# Patient Record
Sex: MALE | Race: WHITE | NOT HISPANIC OR LATINO | Employment: OTHER | URBAN - NONMETROPOLITAN AREA
[De-identification: names, ages, dates, MRNs, and addresses within clinical notes are randomized per-mention and may not be internally consistent; named-entity substitution may affect disease eponyms.]

---

## 2019-06-21 ENCOUNTER — APPOINTMENT (OUTPATIENT)
Dept: RADIOLOGY | Facility: MEDICAL CENTER | Age: 69
End: 2019-06-21
Payer: COMMERCIAL

## 2019-06-21 ENCOUNTER — CONSULT (OUTPATIENT)
Dept: PAIN MEDICINE | Facility: CLINIC | Age: 69
End: 2019-06-21
Payer: MEDICARE

## 2019-06-21 VITALS
WEIGHT: 146 LBS | DIASTOLIC BLOOD PRESSURE: 68 MMHG | SYSTOLIC BLOOD PRESSURE: 114 MMHG | BODY MASS INDEX: 22.13 KG/M2 | HEIGHT: 68 IN

## 2019-06-21 DIAGNOSIS — M47.812 OSTEOARTHRITIS OF CERVICAL SPINE, UNSPECIFIED SPINAL OSTEOARTHRITIS COMPLICATION STATUS: ICD-10-CM

## 2019-06-21 DIAGNOSIS — M47.816 LUMBAR SPONDYLOSIS: ICD-10-CM

## 2019-06-21 DIAGNOSIS — G56.01 CARPAL TUNNEL SYNDROME OF RIGHT WRIST: ICD-10-CM

## 2019-06-21 DIAGNOSIS — M54.16 LUMBAR RADICULOPATHY: ICD-10-CM

## 2019-06-21 DIAGNOSIS — M48.02 CERVICAL SPINAL STENOSIS: ICD-10-CM

## 2019-06-21 DIAGNOSIS — M19.011 PRIMARY OSTEOARTHRITIS OF SHOULDERS, BILATERAL: ICD-10-CM

## 2019-06-21 DIAGNOSIS — M54.12 CERVICAL RADICULOPATHY: ICD-10-CM

## 2019-06-21 DIAGNOSIS — G89.29 CHRONIC BILATERAL LOW BACK PAIN, WITH SCIATICA PRESENCE UNSPECIFIED: Primary | ICD-10-CM

## 2019-06-21 DIAGNOSIS — M54.5 CHRONIC BILATERAL LOW BACK PAIN, WITH SCIATICA PRESENCE UNSPECIFIED: Primary | ICD-10-CM

## 2019-06-21 DIAGNOSIS — S42.031S CLOSED DISPLACED FRACTURE OF ACROMIAL END OF RIGHT CLAVICLE, SEQUELA: ICD-10-CM

## 2019-06-21 DIAGNOSIS — I63.9 CEREBROVASCULAR ACCIDENT (CVA), UNSPECIFIED MECHANISM (HCC): ICD-10-CM

## 2019-06-21 DIAGNOSIS — M19.012 PRIMARY OSTEOARTHRITIS OF SHOULDERS, BILATERAL: ICD-10-CM

## 2019-06-21 PROCEDURE — 73000 X-RAY EXAM OF COLLAR BONE: CPT

## 2019-06-21 PROCEDURE — 73030 X-RAY EXAM OF SHOULDER: CPT

## 2019-06-21 PROCEDURE — 99204 OFFICE O/P NEW MOD 45 MIN: CPT | Performed by: ANESTHESIOLOGY

## 2019-06-21 PROCEDURE — 72114 X-RAY EXAM L-S SPINE BENDING: CPT

## 2019-06-21 RX ORDER — GABAPENTIN 100 MG/1
100 CAPSULE ORAL 3 TIMES DAILY
COMMUNITY
End: 2019-10-10 | Stop reason: SDUPTHER

## 2019-06-21 RX ORDER — FAMOTIDINE 20 MG/1
20 TABLET, FILM COATED ORAL 2 TIMES DAILY
Status: ON HOLD | COMMUNITY
End: 2022-06-27 | Stop reason: ALTCHOICE

## 2019-06-21 RX ORDER — AMLODIPINE BESYLATE 2.5 MG/1
5 TABLET ORAL DAILY
Status: ON HOLD | COMMUNITY
End: 2022-06-27 | Stop reason: ALTCHOICE

## 2019-06-21 RX ORDER — ATORVASTATIN CALCIUM 40 MG/1
40 TABLET, FILM COATED ORAL DAILY
COMMUNITY
End: 2022-07-04

## 2019-06-21 RX ORDER — BACLOFEN 10 MG/1
10 TABLET ORAL 2 TIMES DAILY
Status: ON HOLD | COMMUNITY
End: 2022-06-27 | Stop reason: ALTCHOICE

## 2019-07-01 ENCOUNTER — TELEPHONE (OUTPATIENT)
Dept: PAIN MEDICINE | Facility: MEDICAL CENTER | Age: 69
End: 2019-07-01

## 2019-07-02 ENCOUNTER — TELEPHONE (OUTPATIENT)
Dept: RADIOLOGY | Facility: CLINIC | Age: 69
End: 2019-07-02

## 2019-07-02 NOTE — TELEPHONE ENCOUNTER
----- Message from Bharati Sotomayor MD sent at 7/2/2019  2:42 PM EDT -----  Please call the patient regarding his abnormal result    X-ray of lumbar spine shows disc space narrowing at T12-L1 and L1-L2, multilevel degenerative disc changes

## 2019-07-02 NOTE — TELEPHONE ENCOUNTER
----- Message from Ashlie Rendon MD sent at 7/2/2019  2:42 PM EDT -----  Please call the patient regarding his abnormal result    X-ray left shoulder shows normal shoulder

## 2019-07-02 NOTE — TELEPHONE ENCOUNTER
----- Message from Bud Fonseca MD sent at 7/2/2019  2:41 PM EDT -----  Please call the patient regarding his abnormal result    X-ray of right clavicle shows a dislocation of fracture

## 2019-07-03 NOTE — TELEPHONE ENCOUNTER
Left detailed message as per MARJORIE advising the same  Pt has 7/25 ov  Advised to call ortho to schedule appt as referred at last ov  Advised will call with MRI results once received  MRI is scheduled 7/9

## 2019-07-09 ENCOUNTER — HOSPITAL ENCOUNTER (OUTPATIENT)
Dept: MRI IMAGING | Facility: HOSPITAL | Age: 69
Discharge: HOME | End: 2019-07-09
Attending: ANESTHESIOLOGY
Payer: COMMERCIAL

## 2019-07-09 DIAGNOSIS — M54.12 CERVICAL RADICULOPATHY: ICD-10-CM

## 2019-07-09 DIAGNOSIS — M48.02 CERVICAL SPINAL STENOSIS: ICD-10-CM

## 2019-07-09 DIAGNOSIS — M47.812 OSTEOARTHRITIS OF CERVICAL SPINE, UNSPECIFIED SPINAL OSTEOARTHRITIS COMPLICATION STATUS: ICD-10-CM

## 2019-07-09 PROCEDURE — 72141 MRI NECK SPINE W/O DYE: CPT

## 2019-08-13 ENCOUNTER — TELEPHONE (OUTPATIENT)
Dept: PAIN MEDICINE | Facility: MEDICAL CENTER | Age: 69
End: 2019-08-13

## 2019-08-14 NOTE — TELEPHONE ENCOUNTER
MRI cervical spine shows multiple levels of spinal canal stenosis and degenerative disc disease with arthritic changes    We will schedule patient for a C7-T1 interlaminar epidural steroid injection

## 2019-08-14 NOTE — TELEPHONE ENCOUNTER
S/W pt and advised of results and plan  Explained risks of injection briefly, pt would like to further discuss with  regarding timing and procedure  Advised  would reach out to pt to schedule

## 2019-08-15 NOTE — TELEPHONE ENCOUNTER
Patient returned call, he states that he is set to have spine injections with Dr Antonio Reid, patient was advised to follow up with Dr Antonio Reid for cervical injections, patient will  Patient asked if he could have botox injections into his arms in the office  Patient was advised that RM does not perform botox injections in the office and that there are restrictions with botox specifically  Patient verbalized understanding  Patient requesting if he could have "any" injections performed in the office into his arms  I told patient that I would check w/ Rm and get back to him  Patient verbalized understanding, he was pleasant and appreciative  NUVIA,  What type of in office USGI injection would you like to perform?

## 2019-08-26 NOTE — TELEPHONE ENCOUNTER
I can do botox injections in the surgical center but would have to see pt in the office to map the spasmatic muscles

## 2019-10-09 ENCOUNTER — TELEPHONE (OUTPATIENT)
Dept: PAIN MEDICINE | Facility: MEDICAL CENTER | Age: 69
End: 2019-10-09

## 2019-10-10 ENCOUNTER — OFFICE VISIT (OUTPATIENT)
Dept: PAIN MEDICINE | Facility: CLINIC | Age: 69
End: 2019-10-10
Payer: COMMERCIAL

## 2019-10-10 VITALS
BODY MASS INDEX: 20.37 KG/M2 | WEIGHT: 134.4 LBS | SYSTOLIC BLOOD PRESSURE: 120 MMHG | DIASTOLIC BLOOD PRESSURE: 75 MMHG | HEIGHT: 68 IN

## 2019-10-10 DIAGNOSIS — M47.816 LUMBAR SPONDYLOSIS: ICD-10-CM

## 2019-10-10 DIAGNOSIS — F11.20 UNCOMPLICATED OPIOID DEPENDENCE (HCC): ICD-10-CM

## 2019-10-10 DIAGNOSIS — M54.12 CERVICAL RADICULOPATHY: ICD-10-CM

## 2019-10-10 DIAGNOSIS — Z79.891 LONG-TERM CURRENT USE OF OPIATE ANALGESIC: ICD-10-CM

## 2019-10-10 DIAGNOSIS — G89.29 CHRONIC BILATERAL LOW BACK PAIN WITH SCIATICA, SCIATICA LATERALITY UNSPECIFIED: ICD-10-CM

## 2019-10-10 DIAGNOSIS — M54.40 CHRONIC BILATERAL LOW BACK PAIN WITH SCIATICA, SCIATICA LATERALITY UNSPECIFIED: ICD-10-CM

## 2019-10-10 DIAGNOSIS — G89.4 CHRONIC PAIN SYNDROME: ICD-10-CM

## 2019-10-10 DIAGNOSIS — M48.02 CERVICAL SPINAL STENOSIS: Primary | ICD-10-CM

## 2019-10-10 DIAGNOSIS — M54.2 NECK PAIN: ICD-10-CM

## 2019-10-10 PROCEDURE — 99214 OFFICE O/P EST MOD 30 MIN: CPT | Performed by: ANESTHESIOLOGY

## 2019-10-10 RX ORDER — GABAPENTIN 100 MG/1
300 CAPSULE ORAL 3 TIMES DAILY
Qty: 90 CAPSULE | Refills: 0 | Status: SHIPPED | OUTPATIENT
Start: 2019-10-10 | End: 2019-11-01

## 2019-10-10 RX ORDER — ASPIRIN 81 MG/1
81 TABLET ORAL DAILY
Status: ON HOLD | COMMUNITY
End: 2022-06-27 | Stop reason: ALTCHOICE

## 2019-10-10 NOTE — PROGRESS NOTES
Assessment:  1  Cervical spinal stenosis    2  Cervical radiculopathy    3  Neck pain    4  Chronic bilateral low back pain with sciatica, sciatica laterality unspecified    5  Lumbar spondylosis    6  Chronic pain syndrome    7  Long-term current use of opiate analgesic    8  Uncomplicated opioid dependence ([address] )        Plan:  Patient is a 27-year-old male with complaints of multi joint arthritic pain, neck pain, left arm pain, low back pain, right leg pain , status post fall and December 2017 secondary to stroke and work related injury from 2001 with a history significant for anxiety and depression presents office for follow-up visit  MRI of cervical spine shows multilevel spondylitic changes and moderate spinal stenosis at C5-C6 and C6-C7 with severe foraminal stenosis at C4-C5 through C6-C7 more pronounced on the left than the right  This would correlate with the left C6 and C7 radiculitis  X-ray of right clavicle shows an AC type 3 injury  X-ray bilateral shoulders within normal limits  X-ray of lumbar spine shows multilevel degenerative disc changes  At this time patient would like to forego any interventional management due to prior history of bad experience with epidural steroid injections  1  We will titrate up gabapentin to 300 mg p o  T i d  for lumbar radicular and cervical radicular symptoms  2  We will obtain a urine drug screen and based results start patient on low-dose opioid pain regimen at next office visit  There are risks associated with opioid medications, including dependence, addiction and tolerance  The patient understands and agrees to use these medications only as prescribed  Potential side effects of the medications include, but are not limited to, constipation, drowsiness, addiction, impaired judgment and risk of fatal overdose if not taken as prescribed  The patient was warned against driving while taking sedation medications  Sharing medications is a felony   At this point in time, the patient is showing no signs of addiction, abuse, diversion or suicidal ideation  A urine drug screen was collected at today's office visit as part of our medication management protocol  The point of care testing results were appropriate for what was being prescribed  The specimen will be sent for confirmatory testing  The drug screen is medically necessary because the patient is either dependent on opioid medication or is being considered for opioid medication therapy and the results could impact ongoing or future treatment  The drug screen is to evaluate for the presences or absence of prescribed, non-prescribed, and/or illicit drugs/substances  [place] Prescription Drug Monitoring Program report was reviewed and was appropriate       History of Present Illness: The patient is a 80 y o  male who presents for a follow up office visit in regards to Headache  The patients current symptoms include 8/10 constant sharp, cramping, pressure-like, shooting, numbness and head, bilateral upper extremities left worse than right, left lower extremity which is worse in the morning  Current pain medications includes:    The patient reports that this regimen is providing 0% pain relief  The patient is reporting no side effects from this pain medication regimen  I have personally reviewed and/or updated the patient's past medical history, past surgical history, family history, social history, current medications, allergies, and vital signs today  Review of Systems  Review of Systems   Musculoskeletal: Positive for arthralgias and gait problem  DECREASED RANGE OF MOTION  JOINT STIFFNESS  PAIN IN EXTREMITY - BACK, NECK   All other systems reviewed and are negative  History reviewed  No pertinent past medical history  History reviewed  No pertinent surgical history  History reviewed  No pertinent family history      Social History     Occupational History    Not on file Tobacco Use    Smoking status: Not on file   Substance and Sexual Activity    Alcohol use: Not on file    Drug use: Not on file    Sexual activity: Not on file         Current Outpatient Medications:     amLODIPine (NORVASC) 2 5 mg tablet, Take 5 mg by mouth daily, Disp: , Rfl:     atorvastatin (LIPITOR) 40 mg tablet, Take 40 mg by mouth daily, Disp: , Rfl:     baclofen 10 mg tablet, Take 10 mg by mouth 2 (two) times a day, Disp: , Rfl:     famotidine (PEPCID) 20 mg tablet, Take 20 mg by mouth 2 (two) times a day, Disp: , Rfl:     gabapentin (NEURONTIN) 100 mg capsule, Take 100 mg by mouth 3 (three) times a day, Disp: , Rfl:     sertraline (ZOLOFT) 50 mg tablet, Take 50 mg by mouth daily, Disp: , Rfl:     No Known Allergies    Physical Exam:    /75 (BP Location: Right arm, Patient Position: Sitting, Cuff Size: Standard)   Ht 5' 8" (1 727 m)   Wt 61 kg (134 lb 6 4 oz)   BMI 20 44 kg/m²     Constitutional:normal, well developed, well nourished, alert, in no distress and non-toxic and no overt pain behavior  Eyes:anicteric  HEENT:grossly intact  Neck:supple, symmetric, trachea midline and no masses   Pulmonary:even and unlabored  Cardiovascular:No edema or pitting edema present  Skin:Normal without rashes or lesions and well hydrated  Psychiatric:Mood and affect appropriate  Neurologic:Cranial Nerves II-XII grossly intact  Musculoskeletal:antalgic and in wheelchair    Imaging  No orders to display       No orders of the defined types were placed in this encounter

## 2019-11-01 ENCOUNTER — OFFICE VISIT (OUTPATIENT)
Dept: PAIN MEDICINE | Facility: CLINIC | Age: 69
End: 2019-11-01
Payer: MEDICARE

## 2019-11-01 VITALS — DIASTOLIC BLOOD PRESSURE: 72 MMHG | SYSTOLIC BLOOD PRESSURE: 126 MMHG | BODY MASS INDEX: 20.44 KG/M2 | HEIGHT: 68 IN

## 2019-11-01 DIAGNOSIS — G89.4 CHRONIC PAIN SYNDROME: ICD-10-CM

## 2019-11-01 DIAGNOSIS — M54.40 CHRONIC BILATERAL LOW BACK PAIN WITH SCIATICA, SCIATICA LATERALITY UNSPECIFIED: ICD-10-CM

## 2019-11-01 DIAGNOSIS — G89.29 CHRONIC BILATERAL LOW BACK PAIN WITH SCIATICA, SCIATICA LATERALITY UNSPECIFIED: ICD-10-CM

## 2019-11-01 DIAGNOSIS — M54.2 NECK PAIN: ICD-10-CM

## 2019-11-01 DIAGNOSIS — Z79.891 LONG-TERM CURRENT USE OF OPIATE ANALGESIC: ICD-10-CM

## 2019-11-01 DIAGNOSIS — M48.02 CERVICAL SPINAL STENOSIS: Primary | ICD-10-CM

## 2019-11-01 DIAGNOSIS — M47.816 LUMBAR SPONDYLOSIS: ICD-10-CM

## 2019-11-01 DIAGNOSIS — M50.120 CERVICAL DISC DISORDER WITH RADICULOPATHY OF MID-CERVICAL REGION: ICD-10-CM

## 2019-11-01 DIAGNOSIS — M54.12 CERVICAL RADICULOPATHY: ICD-10-CM

## 2019-11-01 DIAGNOSIS — M47.812 CERVICAL SPONDYLOSIS: ICD-10-CM

## 2019-11-01 DIAGNOSIS — F11.20 UNCOMPLICATED OPIOID DEPENDENCE (HCC): ICD-10-CM

## 2019-11-01 PROCEDURE — 99214 OFFICE O/P EST MOD 30 MIN: CPT | Performed by: ANESTHESIOLOGY

## 2019-11-01 RX ORDER — HYDROCODONE BITARTRATE AND ACETAMINOPHEN 5; 325 MG/1; MG/1
1 TABLET ORAL 2 TIMES DAILY PRN
Qty: 60 TABLET | Refills: 0 | Status: SHIPPED | OUTPATIENT
Start: 2019-11-01 | End: 2019-11-01

## 2019-11-01 RX ORDER — HYDROCODONE BITARTRATE AND ACETAMINOPHEN 5; 325 MG/1; MG/1
1 TABLET ORAL 2 TIMES DAILY PRN
Qty: 60 TABLET | Refills: 0 | Status: SHIPPED | OUTPATIENT
Start: 2019-12-01 | End: 2020-01-17 | Stop reason: SDUPTHER

## 2019-11-01 RX ORDER — GABAPENTIN 400 MG/1
400 CAPSULE ORAL 3 TIMES DAILY
Qty: 90 CAPSULE | Refills: 0 | Status: SHIPPED | OUTPATIENT
Start: 2019-11-01 | End: 2020-01-17

## 2019-11-01 NOTE — PATIENT INSTRUCTIONS
Opioid Dependence   WHAT YOU NEED TO KNOW:   What is opioid dependence? Opioids are medicines, such as morphine and codeine, used to treat pain  Dependence happens after you have used opioids regularly for a long period of time  Dependence means that your body gets used to how much medicine you take  Dependence is not the same as addiction  Addiction means that a person uses opioids to get high instead of using them to control pain  What are the signs and symptoms of opioid dependence? · You need more of the opioid to get the same amount of pain relief as you did when you first started taking it  · You have tried to use less opioid medicine but are not able to  · You have withdrawal symptoms when you take less of the opioid  What are the signs and symptoms of opioid withdrawal?  You may have the following signs and symptoms if you suddenly stop taking opioids or if you decrease the amount you normally take:  · Yawning     · Runny nose     · Nausea or vomiting     · Diarrhea     · Chills or goosebumps    · Muscle aches or cramps     · Anxiety  How is opioid dependence diagnosed? Your healthcare provider will do a physical exam  He will ask you questions about your symptoms and your use of opioids  He will also ask about your current and past use of other drugs and any family history of drug abuse  How is opioid dependence treated? You may be treated in a hospital or you may be treated as an outpatient  During detoxification (detox), healthcare providers will slowly decrease your dose of the opioid medicine you are dependent on  They may use another opioid medicine such as methadone to decrease symptoms of withdrawal  You may need to take this or another medicine for some time  Your healthcare provider will also replace the opioid with another pain medicine that is less likely to cause dependence  He may also suggest that you receive counseling and social support during treatment     What are the risks of opioid dependence? There is a risk of overdose during early treatment with methadone  You may become dependent on the medicines used to treat opioid dependence  Without treatment, you may develop other health problems or become addicted to opioids  Your risk of abusing alcohol and other drugs increases  You may also develop risky behaviors that can lead to an overdose, violence, and suicidal thoughts  When should I contact my healthcare provider? · Your speech is slurred  · You have difficulty staying awake  · You have nausea and vomiting  · You are easily upset or cry easily  · You have poor balance  · You have questions or concerns about your condition or care  When should I seek immediate care or call 911? · You feel lightheaded or faint  · You have a fast, slow, or irregular heartbeat  · You have a seizure  CARE AGREEMENT:   You have the right to help plan your care  Learn about your health condition and how it may be treated  Discuss treatment options with your caregivers to decide what care you want to receive  You always have the right to refuse treatment  The above information is an  only  It is not intended as medical advice for individual conditions or treatments  Talk to your doctor, nurse or pharmacist before following any medical regimen to see if it is safe and effective for you  © 2017 [address]  Information is for End User's use only and may not be sold, redistributed or otherwise used for commercial purposes  All illustrations and images included in CareNotes® are the copyrighted property of A D A M , Inc  or [employer]gang Ly[employer]

## 2019-11-01 NOTE — PROGRESS NOTES
Assessment:  1  Cervical spinal stenosis    2  Cervical spondylosis    3  Cervical radiculopathy    4  Neck pain    5  Cervical disc disorder with radiculopathy of mid-cervical region    6  Lumbar spondylosis    7  Chronic pain syndrome    8  Uncomplicated opioid dependence ([address] )    9  Long-term current use of opiate analgesic        Plan:  Patient is a 81-year-old male with complaints of multi joint arthritic pain, neck pain, left arm pain, low back pain, right leg pain , status post fall and December 2017 secondary to [facility] related injury from 2001 with a history significant for anxiety and depression presents office for follow-up visit  Focus on conservative therapy at this time  1  UDS reviewed and found to be within normal limits  Opioid contract signed today  2  We will trial Norco 5/325mg T i d  for chronic pain syndrome 12/1/19 and 12/31/19  3  We will titrate gabapentin to 400 mg p o  T i d  For lumbar radicular and cervical radicular symptoms  4  Follow-up in 2 months       There are risks associated with opioid medications, including dependence, addiction and tolerance  The patient understands and agrees to use these medications only as prescribed  Potential side effects of the medications include, but are not limited to, constipation, drowsiness, addiction, impaired judgment and risk of fatal overdose if not taken as prescribed  The patient was warned against driving while taking sedation medications  Sharing medications is a felony  At this point in time, the patient is showing no signs of addiction, abuse, diversion or suicidal ideation  [place] Prescription Drug Monitoring Program report was reviewed and was appropriate       History of Present Illness: The patient is a 80 y o  male who presents for a follow up office visit in regards to Back Pain; Neck Pain; Shoulder Pain; Arm Pain; Hand Pain; Foot Pain; Hip Pain; Leg Pain; and Knee Pain     The patients current symptoms include 10/10 constant throbbing, cramping, shooting which is worse in the morning nighttime  Current pain medications includes:  Gabapentin 300 mg   The patient reports that this regimen is providing 15% pain relief  The patient is reporting no side effects from this pain medication regimen  I have personally reviewed and/or updated the patient's past medical history, past surgical history, family history, social history, current medications, allergies, and vital signs today  Review of Systems  Review of Systems   Musculoskeletal: Positive for gait problem  Joint stiffness  Pain in extremity    All other systems reviewed and are negative  History reviewed  No pertinent past medical history  History reviewed  No pertinent surgical history      Family History   Problem Relation Age of Onset    No Known Problems Mother     No Known Problems Father        Social History     Occupational History    Not on file   Tobacco Use    Smoking status: Current Every Day Smoker    Smokeless tobacco: Never Used   Substance and Sexual Activity    Alcohol use: Yes     Frequency: Monthly or less     Comment: occassional    Drug use: Never    Sexual activity: Not on file         Current Outpatient Medications:     amLODIPine (NORVASC) 2 5 mg tablet, Take 5 mg by mouth daily, Disp: , Rfl:     aspirin (ECOTRIN LOW STRENGTH) 81 mg EC tablet, Take 81 mg by mouth daily, Disp: , Rfl:     atorvastatin (LIPITOR) 40 mg tablet, Take 40 mg by mouth daily, Disp: , Rfl:     baclofen 10 mg tablet, Take 10 mg by mouth 2 (two) times a day, Disp: , Rfl:     famotidine (PEPCID) 20 mg tablet, Take 20 mg by mouth 2 (two) times a day, Disp: , Rfl:     gabapentin (NEURONTIN) 100 mg capsule, Take 3 capsules (300 mg total) by mouth 3 (three) times a day, Disp: 90 capsule, Rfl: 0    sertraline (ZOLOFT) 50 mg tablet, Take 50 mg by mouth daily, Disp: , Rfl:     No Known Allergies    Physical Exam:    /72 (BP Location: Right arm, Patient Position: Sitting, Cuff Size: Standard)   Ht 5' 8" (1 727 m)   BMI 20 44 kg/m²     Constitutional:normal, well developed, well nourished, alert, in no distress and non-toxic and no overt pain behavior  Eyes:anicteric  HEENT:grossly intact  Neck:supple, symmetric, trachea midline and no masses   Pulmonary:even and unlabored  Cardiovascular:No edema or pitting edema present  Skin:Normal without rashes or lesions and well hydrated  Psychiatric:Mood and affect appropriate  Neurologic:Cranial Nerves II-XII grossly intact  Musculoskeletal:antalgic and in wheelchair    Imaging  No orders to display       No orders of the defined types were placed in this encounter

## 2020-01-06 ENCOUNTER — TELEPHONE (OUTPATIENT)
Dept: PAIN MEDICINE | Facility: CLINIC | Age: 70
End: 2020-01-06

## 2020-01-06 NOTE — TELEPHONE ENCOUNTER
Patient would like to speak to Dr Ward directly about his status   Please advise, adebayo    Call back# [phone]

## 2020-01-17 ENCOUNTER — TELEPHONE (OUTPATIENT)
Dept: PAIN MEDICINE | Facility: CLINIC | Age: 70
End: 2020-01-17

## 2020-01-17 ENCOUNTER — OFFICE VISIT (OUTPATIENT)
Dept: PAIN MEDICINE | Facility: CLINIC | Age: 70
End: 2020-01-17
Payer: COMMERCIAL

## 2020-01-17 VITALS — HEIGHT: 68 IN | DIASTOLIC BLOOD PRESSURE: 75 MMHG | BODY MASS INDEX: 20.44 KG/M2 | SYSTOLIC BLOOD PRESSURE: 117 MMHG

## 2020-01-17 DIAGNOSIS — M54.2 NECK PAIN: ICD-10-CM

## 2020-01-17 DIAGNOSIS — F11.20 UNCOMPLICATED OPIOID DEPENDENCE (HCC): ICD-10-CM

## 2020-01-17 DIAGNOSIS — M47.812 CERVICAL SPONDYLOSIS: ICD-10-CM

## 2020-01-17 DIAGNOSIS — M54.12 CERVICAL RADICULOPATHY: ICD-10-CM

## 2020-01-17 DIAGNOSIS — G89.4 CHRONIC PAIN SYNDROME: ICD-10-CM

## 2020-01-17 DIAGNOSIS — M50.120 CERVICAL DISC DISORDER WITH RADICULOPATHY OF MID-CERVICAL REGION: ICD-10-CM

## 2020-01-17 DIAGNOSIS — Z79.891 LONG-TERM CURRENT USE OF OPIATE ANALGESIC: ICD-10-CM

## 2020-01-17 DIAGNOSIS — M47.816 LUMBAR SPONDYLOSIS: ICD-10-CM

## 2020-01-17 DIAGNOSIS — M48.02 CERVICAL SPINAL STENOSIS: Primary | ICD-10-CM

## 2020-01-17 PROCEDURE — 99214 OFFICE O/P EST MOD 30 MIN: CPT | Performed by: ANESTHESIOLOGY

## 2020-01-17 RX ORDER — HYDROCODONE BITARTRATE AND ACETAMINOPHEN 5; 325 MG/1; MG/1
1 TABLET ORAL 2 TIMES DAILY PRN
Qty: 60 TABLET | Refills: 0 | Status: SHIPPED | OUTPATIENT
Start: 2020-01-17 | End: 2020-01-17

## 2020-01-17 RX ORDER — GABAPENTIN 600 MG/1
600 TABLET ORAL 3 TIMES DAILY
Qty: 90 TABLET | Refills: 1 | Status: SHIPPED | OUTPATIENT
Start: 2020-01-17 | End: 2020-03-26 | Stop reason: SDUPTHER

## 2020-01-17 RX ORDER — HYDROCODONE BITARTRATE AND ACETAMINOPHEN 5; 325 MG/1; MG/1
1 TABLET ORAL 2 TIMES DAILY PRN
Qty: 60 TABLET | Refills: 0 | Status: SHIPPED | OUTPATIENT
Start: 2020-02-16 | End: 2020-03-17 | Stop reason: ALTCHOICE

## 2020-01-17 NOTE — TELEPHONE ENCOUNTER
Patient   [phone]  Dr Shirley Cuello    Patient is calling in stating that he forgot to ask Dr Shirley Cuello, if he could have something for his pain until he see's the Neurosx  Dr      He said the last medication didn't work for him   He doesn't remember the last one  (gabapentin (NEURONTIN) 600 MG or maybe Vicodin not sure)         Please send script to [id number] - [address] - [address] [place] [address]

## 2020-01-17 NOTE — PROGRESS NOTES
Assessment:  1  Cervical spinal stenosis    2  Cervical spondylosis    3  Lumbar spondylosis    4  Cervical radiculopathy    5  Cervical disc disorder with radiculopathy of mid-cervical region    6  Chronic pain syndrome    7  Neck pain    8  Long-term current use of opiate analgesic    9  Uncomplicated opioid dependence ([address] )        Plan:  Patient is a 72-year-old male with complaints of multi joint arthritic pain, neck pain, left arm pain, low back pain, right leg pain , status post fall and December 2017 secondary to [facility] related injury from 2001 with a history significant for anxiety and depression presents office for follow-up visit  MRI cervical spine was reviewed patient which showed multi levels of foraminal stenosis and spinal canal stenosis  There is not much space for spinal cord stimulator lead placement and at this time we feel that patient should be seen by neurosurgeon to the severity of the cervical spine pathology  1  We will provide patient other neurosurgical evaluation in regards to spinal canal stenosis  2  We will refill Norco 5325 mg p o  T i d   3  We will titrate up gabapentin to 600 mg p o  T i d  Cervical lumbar radicular symptoms  5  Follow-up in 2 months      There are risks associated with opioid medications, including dependence, addiction and tolerance  The patient understands and agrees to use these medications only as prescribed  Potential side effects of the medications include, but are not limited to, constipation, drowsiness, addiction, impaired judgment and risk of fatal overdose if not taken as prescribed  The patient was warned against driving while taking sedation medications  Sharing medications is a felony  At this point in time, the patient is showing no signs of addiction, abuse, diversion or suicidal ideation  [place] Prescription Drug Monitoring Program report was reviewed and was appropriate       History of Present Illness:   The patient is a (three) times a day, Disp: 90 capsule, Rfl: 0    sertraline (ZOLOFT) 50 mg tablet, Take 50 mg by mouth daily, Disp: , Rfl:     No Known Allergies    Physical Exam:    /75 (BP Location: Right arm, Patient Position: Sitting, Cuff Size: Large)   Ht 5' 8" (1 727 m)   BMI 20 44 kg/m²     Constitutional:normal, well developed, well nourished, alert, in no distress and non-toxic and no overt pain behavior  Eyes:anicteric  HEENT:grossly intact  Neck:supple, symmetric, trachea midline and no masses   Pulmonary:even and unlabored  Cardiovascular:No edema or pitting edema present  Skin:Normal without rashes or lesions and well hydrated  Psychiatric:Mood and affect appropriate  Neurologic:Cranial Nerves II-XII grossly intact  Musculoskeletal:antalgic and in wheelchair     Cervical Spine examination demonstrates  Decreased ROM secondary to pain with lateral rotation to the left/right and bending to the left/right, in addition to neck flexion  3/5 upper extremity strength in all muscle groups bilaterally, 3/5  strength bilaterally  Positive Spurling's maneuver to the b/l Ue, sensitivity to light touch intact b/l Ue  Imaging  No orders to display       No orders of the defined types were placed in this encounter

## 2020-01-17 NOTE — TELEPHONE ENCOUNTER
S/w pt, informed him that RM refilled his [place] at his appt today and increase his Gabapentin as well  Pt aware and will have his caregiver go get it today

## 2020-01-23 NOTE — TELEPHONE ENCOUNTER
Pt care giver called stating that the medication is not helping pt   He is still in a lot of pain    Pt can be reached at [phone]

## 2020-01-24 DIAGNOSIS — M54.12 CERVICAL RADICULOPATHY: Primary | ICD-10-CM

## 2020-01-24 RX ORDER — METHYLPREDNISOLONE 4 MG/1
TABLET ORAL
Qty: 21 TABLET | Refills: 0 | Status: ON HOLD | OUTPATIENT
Start: 2020-01-24 | End: 2022-06-27 | Stop reason: ALTCHOICE

## 2020-01-24 NOTE — TELEPHONE ENCOUNTER
S/W pt and caregiver, [place] per MARJORIE  States started increased Gabapentin 600mg TID on 1/172020 and does not feel that it is helping at all  States taking Norco 2 x daily without any help  Is having pain 8/10 in both neck, radiating down arms, and low back, radiating down legs  Is awaiting appt with Dr Norma Mello who is out of the office until 1/27/2020 and has not yet reviewed imaging  Please advise

## 2020-01-24 NOTE — TELEPHONE ENCOUNTER
S/w pt and advised the same  Advised to avoid NSAIDs while on steroid teodora  Pt verbalized understanding

## 2020-02-20 ENCOUNTER — TELEPHONE (OUTPATIENT)
Dept: PAIN MEDICINE | Facility: CLINIC | Age: 70
End: 2020-02-20

## 2020-02-20 NOTE — TELEPHONE ENCOUNTER
S/W pt  Advised pt RM wrote a script for norco on 1/17 to be filled on 2/16  He will check with the pharmacy  Advised pt in RM's 1/17 [facility] note, it said to f/u in 2 months  Advised him narcotic refills need to be done at a [facility]   He put his caregiver on the phone to schedule [facility]   Scheduled pt for [facility] on 3/9 at 10:45 w/ BK in [facility] pass  Caregiver verbalized understanding

## 2020-02-20 NOTE — TELEPHONE ENCOUNTER
Pt contacted Call Center requested refill of their medication  He does not see Dr Mildred Deras (Neurosurgeon ) see him until April and would like this refill to be sent over  Medication Name:HYDROcodone-acetaminophen (NORCO) 5-325 mg per tablet           Dosage of Med: 5-325 mg       Frequency of Med:Take 1 tablet by mouth 2 (two) times a day as needed for painMax Daily Amount: 2 tablets      Remaining Medication: 0      Pharmacy and Location: Pharmacy on file         Pt  Preferred Callback Phone Number: [phone]      Thank you

## 2020-03-19 ENCOUNTER — TELEPHONE (OUTPATIENT)
Dept: PAIN MEDICINE | Facility: CLINIC | Age: 70
End: 2020-03-19

## 2020-03-19 DIAGNOSIS — M54.2 NECK PAIN: Primary | ICD-10-CM

## 2020-03-19 DIAGNOSIS — G89.4 CHRONIC PAIN SYNDROME: ICD-10-CM

## 2020-03-19 RX ORDER — HYDROCODONE BITARTRATE AND ACETAMINOPHEN 5; 325 MG/1; MG/1
TABLET ORAL
Qty: 60 TABLET | Refills: 0 | Status: SHIPPED | OUTPATIENT
Start: 2020-03-19 | End: 2020-03-26 | Stop reason: SDUPTHER

## 2020-03-19 NOTE — TELEPHONE ENCOUNTER
Pt contacted Call Center requested refill of their medication  He is unable to get out of the house and wanted to know if Dr Sophia Chisholm is able to send a script for the below medication  The medication  on 2020       Medication Name:  HYDROcodone-acetaminophen           Dosage of Med: 5-325 mg     Frequency of Med: Take 1 tablet by mouth 2 (two) times a day as needed for painMax Daily Amount: 2 tablets      Remaining Medication: 0      Pharmacy and Location: Pharmacy on file         Pt  Preferred Callback Phone Number: [phone]        Thank you

## 2020-03-19 NOTE — TELEPHONE ENCOUNTER
See below, according to PMED pt filled 2/20 for 30 days  Pt cancelled appt on 3/9 with you  Want to set up pt for virtual visit today?

## 2020-03-19 NOTE — TELEPHONE ENCOUNTER
Patient call in regard to the medication refill  He was advised that refills can take 24-48 hours  Pt stated that he is in a lot of pain,and would like the medication as soon as possible  Please advise thank you      Patient call back #  [phone]

## 2020-03-19 NOTE — TELEPHONE ENCOUNTER
I sent prescription for hydrocodone to his pharmacy  Please schedule virtual visit in the near future    Thanks,  Lyn Resendez

## 2020-03-19 NOTE — TELEPHONE ENCOUNTER
Left detailed message per MARJORIE advising of script  Also advised [facility] will be reaching out to set up a virtual office visit in the near future      Please try pt tomorrow 3/20/2020 to schedule virtual visit

## 2020-03-25 NOTE — TELEPHONE ENCOUNTER
I attempted to call the patient and let him know that he needs to schedule a virtual visit with Dr Ryan Robert before we can proceed with his refill  I left a message on his voice mail and advised him to give us a call back tomorrow after 8:00 a m  And then we can get him scheduled for a virtual visit with Dr Valenzuela Read see either tomorrow or Friday

## 2020-03-25 NOTE — TELEPHONE ENCOUNTER
Pt called and stated the pharmacy only gave him a 7 day supply and his appt is not until Monday 3/30 via virtual phone      Pt is now out of medication and would like to know if another script can be sent

## 2020-03-25 NOTE — TELEPHONE ENCOUNTER
Patient called in regard to the prescription refill request  The prescription has not been filled   Please advise thank you       Patient call back #  [phone]

## 2020-03-25 NOTE — TELEPHONE ENCOUNTER
This is addressed in another task  He was supposed to be seen in the office before we send another refill   Dr Zoe Lockett wants him to schedule a f/u virtual visit to discuss first  - - -

## 2020-03-25 NOTE — TELEPHONE ENCOUNTER
Pt called and stated that the pharmacy did not have the script for :Medication Name:HYDROcodone-acetaminophen ([facility]) 5-325 mg per tablet   Pt would like to know   if it can be sent again      PT can be reached at [phone]      Just confirming the script should be sent to :  Pharmacy:  [facility] - [address] [address] Phone:  [phone] Fax:  [phone]

## 2020-03-26 ENCOUNTER — TELEMEDICINE (OUTPATIENT)
Dept: PAIN MEDICINE | Facility: CLINIC | Age: 70
End: 2020-03-26
Payer: COMMERCIAL

## 2020-03-26 DIAGNOSIS — M48.02 CERVICAL SPINAL STENOSIS: ICD-10-CM

## 2020-03-26 DIAGNOSIS — M50.120 CERVICAL DISC DISORDER WITH RADICULOPATHY OF MID-CERVICAL REGION: ICD-10-CM

## 2020-03-26 DIAGNOSIS — G89.4 CHRONIC PAIN SYNDROME: ICD-10-CM

## 2020-03-26 DIAGNOSIS — M54.12 CERVICAL RADICULOPATHY: Primary | ICD-10-CM

## 2020-03-26 DIAGNOSIS — M54.2 NECK PAIN: ICD-10-CM

## 2020-03-26 DIAGNOSIS — F11.20 UNCOMPLICATED OPIOID DEPENDENCE (HCC): ICD-10-CM

## 2020-03-26 DIAGNOSIS — M47.812 CERVICAL SPONDYLOSIS: ICD-10-CM

## 2020-03-26 DIAGNOSIS — Z79.891 LONG-TERM CURRENT USE OF OPIATE ANALGESIC: ICD-10-CM

## 2020-03-26 PROCEDURE — G2012 BRIEF CHECK IN BY MD/QHP: HCPCS | Performed by: ANESTHESIOLOGY

## 2020-03-26 RX ORDER — HYDROCODONE BITARTRATE AND ACETAMINOPHEN 5; 325 MG/1; MG/1
TABLET ORAL
Qty: 60 TABLET | Refills: 0 | Status: SHIPPED | OUTPATIENT
Start: 2020-03-26 | End: 2020-03-26

## 2020-03-26 RX ORDER — GABAPENTIN 600 MG/1
600 TABLET ORAL 3 TIMES DAILY
Qty: 90 TABLET | Refills: 1 | Status: SHIPPED | OUTPATIENT
Start: 2020-03-26 | End: 2020-06-11 | Stop reason: SDUPTHER

## 2020-03-26 RX ORDER — HYDROCODONE BITARTRATE AND ACETAMINOPHEN 5; 325 MG/1; MG/1
1 TABLET ORAL 2 TIMES DAILY PRN
Qty: 60 TABLET | Refills: 0 | Status: SHIPPED | OUTPATIENT
Start: 2020-04-25 | End: 2020-06-11 | Stop reason: SDUPTHER

## 2020-03-26 NOTE — PROGRESS NOTES
Virtual Regular Visit    Problem List Items Addressed This Visit        Nervous and Auditory    Cervical radiculopathy - Primary    Cervical disc disorder with radiculopathy of mid-cervical region       Musculoskeletal and Integument    Cervical spondylosis       Other    Cervical spinal stenosis    Neck pain    Long-term current use of opiate analgesic    Uncomplicated opioid dependence ([facility] [address] )    Chronic pain syndrome               Reason for visit is neck pain and bilateral arm pain    Encounter provider Evelyn Hernandez MD    Provider located at [address] [place] [address] [address] [place] [address]  [phone]      Recent Visits  Date Type Provider Dept   03/19/20 Telephone Evelyn Hernandez MD [facility]   Showing recent visits within past 7 days and meeting all other requirements     Today's Visits  Date Type Provider Dept   03/26/20 Telemedicine Evelyn Hernandez MD [facility]   Showing today's visits and meeting all other requirements     Future Appointments  No visits were found meeting these conditions  Showing future appointments within next 150 days and meeting all other requirements        After connecting through [employer], the patient was identified by name and date of birth  Titi Torres was informed that this is a telemedicine visit and that the visit is being conducted through telephone which may not be secure and therefore, might not be HIPAA-compliant  My office door was closed  No one else was in the room  He acknowledged consent and understanding of privacy and security of the video platform  The patient has agreed to participate and understands they can discontinue the visit at any time  Patient reports his pain is 5/10, constant, sharp, cramping pain in bilateral upper lower extremities which is worse in the morning and at nighttime       Subjective  Titi Torres is a 77 y o  male with complaints of multi joint arthritic pain, neck pain, left arm pain, low back pain, right leg pain , status post fall and December 2017 secondary to [facility] related injury from 2001 with a history significant for anxiety and depression  Patient denies any adverse events since last visit  Patient shows no signs of adverse drug related behavior  Patient reports his pain is well managed on current pain regimen  No past medical history on file  No past surgical history on file  Current Outpatient Medications   Medication Sig Dispense Refill    amLODIPine (NORVASC) 2 5 mg tablet Take 5 mg by mouth daily      aspirin (ECOTRIN LOW STRENGTH) 81 mg EC tablet Take 81 mg by mouth daily      atorvastatin (LIPITOR) 40 mg tablet Take 40 mg by mouth daily      baclofen 10 mg tablet Take 10 mg by mouth 2 (two) times a day      famotidine (PEPCID) 20 mg tablet Take 20 mg by mouth 2 (two) times a day      gabapentin (NEURONTIN) 600 MG tablet Take 1 tablet (600 mg total) by mouth 3 (three) times a day 90 tablet 1    HYDROcodone-acetaminophen (NORCO) 5-325 mg per tablet 1 pill twice daily if needed for pain 60 tablet 0    methylPREDNISolone 4 MG tablet therapy pack Use as directed on package 21 tablet 0    sertraline (ZOLOFT) 50 mg tablet Take 50 mg by mouth daily       No current facility-administered medications for this visit  No Known Allergies    Review of Systems   Musculoskeletal: Positive for arthralgias, back pain and myalgias  All other systems reviewed and are negative  Assessment/plan  Patient is a 50-year-old male who presents for a follow-up in regards to low back pain, bilateral arm and bilateral leg pain and shoulder pain  1  We will provide patient with a refill of Norco 5/325 mg p o  T i d   2  We will continue gabapentin 600 mg p o  T i d  For cervical lumbar radicular symptoms  3  We will follow up in 2 months     I spent 15 minutes with the patient during this visit

## 2020-03-26 NOTE — PATIENT INSTRUCTIONS
Assessment/plan  Patient is a 28-year-old male who presents for a follow-up in regards to low back pain, bilateral arm and bilateral leg pain and shoulder pain  1  We will provide patient with a refill of Norco 5/325 mg p o  T i d   2  We will continue gabapentin 600 mg p o  T i d  For cervical lumbar radicular symptoms  3   We will follow up in 2 months

## 2020-03-26 NOTE — TELEPHONE ENCOUNTER
I called the patient and scheduled him for a 930 virtual visit this morning with Dr Mariella Yepez and canceled the virtual visit that was on March 30th  The patient was agreeable and verbalized an understanding

## 2020-05-22 ENCOUNTER — TELEPHONE (OUTPATIENT)
Dept: NEUROSURGERY | Facility: CLINIC | Age: 70
End: 2020-05-22

## 2020-05-27 ENCOUNTER — TELEPHONE (OUTPATIENT)
Dept: PAIN MEDICINE | Facility: CLINIC | Age: 70
End: 2020-05-27

## 2020-06-11 ENCOUNTER — TELEMEDICINE (OUTPATIENT)
Dept: PAIN MEDICINE | Facility: CLINIC | Age: 70
End: 2020-06-11
Payer: COMMERCIAL

## 2020-06-11 DIAGNOSIS — M54.50 CHRONIC BILATERAL LOW BACK PAIN WITHOUT SCIATICA: ICD-10-CM

## 2020-06-11 DIAGNOSIS — G89.4 CHRONIC PAIN SYNDROME: Primary | ICD-10-CM

## 2020-06-11 DIAGNOSIS — M47.816 LUMBAR SPONDYLOSIS: ICD-10-CM

## 2020-06-11 DIAGNOSIS — M47.812 CERVICAL SPONDYLOSIS: ICD-10-CM

## 2020-06-11 DIAGNOSIS — M54.12 CERVICAL RADICULOPATHY: ICD-10-CM

## 2020-06-11 DIAGNOSIS — M48.02 CERVICAL SPINAL STENOSIS: ICD-10-CM

## 2020-06-11 DIAGNOSIS — M50.120 CERVICAL DISC DISORDER WITH RADICULOPATHY OF MID-CERVICAL REGION: ICD-10-CM

## 2020-06-11 DIAGNOSIS — G89.29 CHRONIC BILATERAL LOW BACK PAIN WITHOUT SCIATICA: ICD-10-CM

## 2020-06-11 DIAGNOSIS — M54.2 NECK PAIN: ICD-10-CM

## 2020-06-11 PROCEDURE — 99214 OFFICE O/P EST MOD 30 MIN: CPT | Performed by: NURSE PRACTITIONER

## 2020-06-11 RX ORDER — HYDROCODONE BITARTRATE AND ACETAMINOPHEN 5; 325 MG/1; MG/1
1 TABLET ORAL 2 TIMES DAILY PRN
Qty: 60 TABLET | Refills: 0 | Status: ON HOLD | OUTPATIENT
Start: 2020-06-11 | End: 2022-06-27 | Stop reason: ALTCHOICE

## 2020-06-11 RX ORDER — HYDROCODONE BITARTRATE AND ACETAMINOPHEN 5; 325 MG/1; MG/1
1 TABLET ORAL 2 TIMES DAILY PRN
Qty: 60 TABLET | Refills: 0 | Status: SHIPPED | OUTPATIENT
Start: 2020-06-11 | End: 2020-06-11 | Stop reason: SDUPTHER

## 2020-06-11 RX ORDER — GABAPENTIN 600 MG/1
600 TABLET ORAL 3 TIMES DAILY
Qty: 90 TABLET | Refills: 1 | Status: SHIPPED | OUTPATIENT
Start: 2020-06-11 | End: 2022-07-04

## 2020-08-24 DIAGNOSIS — G89.4 CHRONIC PAIN SYNDROME: ICD-10-CM

## 2020-08-24 DIAGNOSIS — M54.2 NECK PAIN: ICD-10-CM

## 2020-08-24 RX ORDER — HYDROCODONE BITARTRATE AND ACETAMINOPHEN 5; 325 MG/1; MG/1
TABLET ORAL
Qty: 60 TABLET | OUTPATIENT
Start: 2020-08-24

## 2020-08-26 ENCOUNTER — TELEPHONE (OUTPATIENT)
Dept: PAIN MEDICINE | Facility: CLINIC | Age: 70
End: 2020-08-26

## 2020-08-26 NOTE — TELEPHONE ENCOUNTER
Pt contacted Call Center requested refill of their medication  Medication Name:HYDROcodone-acetaminophen (NORCO          Dosage of Med: 5-325 mg       Frequency of Med:Take 1 tablet by mouth 2 (two) times a day as needed for pain 1 pill twice daily if needed for pain   Do not fill until 7/9/2020Max Daily Amount: 2 tablets       Remaining Medication: 0      Pharmacy and Location:  [address] [place] [address]  [phone]        Pt  Preferred Callback Phone Number: [phone]      Thank you

## 2020-08-26 NOTE — TELEPHONE ENCOUNTER
--pt to C/B--    FYI-    S/W pt  Advised pt of narcotic refill policy which requires a SOVS   Advised pt he was a no show on 8/6  He stated he did not have transportation and he does not drive  Offered pt appt for 9/1  The aide that is at his house now can not bring him on 9/1  Pt stated he will have to C/B to schedule when he can find transportation  Pt put his aide Helder Perez on the phone  Advised her of the same  She stated she will try to help him find transportation and have the pt C/B

## 2020-10-01 RX ORDER — HYDROCODONE BITARTRATE AND ACETAMINOPHEN 5; 325 MG/1; MG/1
TABLET ORAL
Qty: 60 TABLET | OUTPATIENT
Start: 2020-10-01

## 2020-10-20 ENCOUNTER — TELEPHONE (OUTPATIENT)
Dept: PAIN MEDICINE | Facility: CLINIC | Age: 70
End: 2020-10-20

## 2022-06-27 ENCOUNTER — ANESTHESIA (INPATIENT)
Dept: GASTROENTEROLOGY | Facility: HOSPITAL | Age: 72
DRG: 377 | End: 2022-06-27
Payer: COMMERCIAL

## 2022-06-27 ENCOUNTER — APPOINTMENT (EMERGENCY)
Dept: RADIOLOGY | Facility: HOSPITAL | Age: 72
End: 2022-06-27
Payer: COMMERCIAL

## 2022-06-27 ENCOUNTER — APPOINTMENT (INPATIENT)
Dept: CT IMAGING | Facility: HOSPITAL | Age: 72
DRG: 377 | End: 2022-06-27
Payer: COMMERCIAL

## 2022-06-27 ENCOUNTER — APPOINTMENT (INPATIENT)
Dept: GASTROENTEROLOGY | Facility: HOSPITAL | Age: 72
DRG: 377 | End: 2022-06-27
Attending: INTERNAL MEDICINE
Payer: COMMERCIAL

## 2022-06-27 ENCOUNTER — HOSPITAL ENCOUNTER (INPATIENT)
Facility: HOSPITAL | Age: 72
LOS: 1 days | DRG: 377 | End: 2022-06-27
Attending: FAMILY MEDICINE | Admitting: STUDENT IN AN ORGANIZED HEALTH CARE EDUCATION/TRAINING PROGRAM
Payer: COMMERCIAL

## 2022-06-27 ENCOUNTER — ANESTHESIA EVENT (INPATIENT)
Dept: GASTROENTEROLOGY | Facility: HOSPITAL | Age: 72
DRG: 377 | End: 2022-06-27
Payer: COMMERCIAL

## 2022-06-27 ENCOUNTER — APPOINTMENT (INPATIENT)
Dept: RADIOLOGY | Facility: HOSPITAL | Age: 72
DRG: 377 | End: 2022-06-27
Payer: COMMERCIAL

## 2022-06-27 ENCOUNTER — HOSPITAL ENCOUNTER (EMERGENCY)
Facility: HOSPITAL | Age: 72
End: 2022-06-27
Attending: EMERGENCY MEDICINE
Payer: COMMERCIAL

## 2022-06-27 ENCOUNTER — HOSPITAL ENCOUNTER (INPATIENT)
Facility: HOSPITAL | Age: 72
LOS: 7 days | Discharge: NURSING, REHAB OR LONG-TERM CARE | DRG: 086 | End: 2022-07-04
Attending: EMERGENCY MEDICINE | Admitting: SURGERY
Payer: COMMERCIAL

## 2022-06-27 ENCOUNTER — APPOINTMENT (INPATIENT)
Dept: RADIOLOGY | Facility: HOSPITAL | Age: 72
DRG: 086 | End: 2022-06-27
Payer: COMMERCIAL

## 2022-06-27 VITALS
OXYGEN SATURATION: 92 % | BODY MASS INDEX: 19.09 KG/M2 | HEART RATE: 60 BPM | HEIGHT: 70 IN | RESPIRATION RATE: 19 BRPM | SYSTOLIC BLOOD PRESSURE: 140 MMHG | DIASTOLIC BLOOD PRESSURE: 72 MMHG | WEIGHT: 133.38 LBS | TEMPERATURE: 98.7 F

## 2022-06-27 VITALS
OXYGEN SATURATION: 96 % | RESPIRATION RATE: 18 BRPM | HEART RATE: 58 BPM | TEMPERATURE: 98.5 F | SYSTOLIC BLOOD PRESSURE: 146 MMHG | DIASTOLIC BLOOD PRESSURE: 72 MMHG

## 2022-06-27 DIAGNOSIS — S06.5XAA SUBDURAL HEMATOMA: Primary | ICD-10-CM

## 2022-06-27 DIAGNOSIS — K92.2 GIB (GASTROINTESTINAL BLEEDING): ICD-10-CM

## 2022-06-27 DIAGNOSIS — K92.0 HEMATEMESIS WITH NAUSEA: ICD-10-CM

## 2022-06-27 DIAGNOSIS — K92.2 GI BLEED: Primary | ICD-10-CM

## 2022-06-27 DIAGNOSIS — K92.2 UPPER GI BLEED: Primary | ICD-10-CM

## 2022-06-27 DIAGNOSIS — K92.2 GI BLEED: ICD-10-CM

## 2022-06-27 DIAGNOSIS — K21.9 GERD (GASTROESOPHAGEAL REFLUX DISEASE): ICD-10-CM

## 2022-06-27 DIAGNOSIS — D62 ACUTE BLOOD LOSS ANEMIA: ICD-10-CM

## 2022-06-27 PROBLEM — I10 ESSENTIAL HYPERTENSION: Status: ACTIVE | Noted: 2022-06-27

## 2022-06-27 PROBLEM — I69.359 HEMIPLEGIA, POST-STROKE (HCC): Status: ACTIVE | Noted: 2022-06-27

## 2022-06-27 PROBLEM — F32.9 MAJOR DEPRESSIVE DISORDER: Status: ACTIVE | Noted: 2022-06-27

## 2022-06-27 PROBLEM — S06.5X9A SUBDURAL HEMATOMA (HCC): Status: ACTIVE | Noted: 2022-06-27

## 2022-06-27 PROBLEM — R40.0 DROWSINESS: Status: ACTIVE | Noted: 2022-06-27

## 2022-06-27 PROBLEM — Z72.0 TOBACCO USE: Status: ACTIVE | Noted: 2022-06-27

## 2022-06-27 LAB
2HR DELTA HS TROPONIN: 1 NG/L
4HR DELTA HS TROPONIN: 1 NG/L
ABO GROUP BLD: NORMAL
ALBUMIN SERPL BCP-MCNC: 4 G/DL (ref 3.5–5)
ALP SERPL-CCNC: 75 U/L (ref 34–104)
ALT SERPL W P-5'-P-CCNC: 11 U/L (ref 7–52)
AMPHETAMINES SERPL QL SCN: POSITIVE
ANION GAP SERPL CALCULATED.3IONS-SCNC: 11 MMOL/L (ref 4–13)
ANION GAP SERPL CALCULATED.3IONS-SCNC: 6 MMOL/L (ref 4–13)
APTT PPP: 27 SECONDS (ref 23–37)
AST SERPL W P-5'-P-CCNC: 21 U/L (ref 13–39)
BARBITURATES UR QL: NEGATIVE
BASOPHILS # BLD AUTO: 0.02 THOUSANDS/ΜL (ref 0–0.1)
BASOPHILS # BLD AUTO: 0.03 THOUSANDS/ΜL (ref 0–0.1)
BASOPHILS # BLD AUTO: 0.04 THOUSANDS/ΜL (ref 0–0.1)
BASOPHILS NFR BLD AUTO: 0 % (ref 0–1)
BASOPHILS NFR BLD AUTO: 0 % (ref 0–1)
BASOPHILS NFR BLD AUTO: 1 % (ref 0–1)
BENZODIAZ UR QL: NEGATIVE
BILIRUB SERPL-MCNC: 0.73 MG/DL (ref 0.2–1)
BLD GP AB SCN SERPL QL: NEGATIVE
BLD GP AB SCN SERPL QL: NEGATIVE
BUN SERPL-MCNC: 15 MG/DL (ref 5–25)
BUN SERPL-MCNC: 23 MG/DL (ref 5–25)
CALCIUM SERPL-MCNC: 8.3 MG/DL (ref 8.3–10.1)
CALCIUM SERPL-MCNC: 9.2 MG/DL (ref 8.4–10.2)
CARDIAC TROPONIN I PNL SERPL HS: 8 NG/L
CARDIAC TROPONIN I PNL SERPL HS: 9 NG/L
CARDIAC TROPONIN I PNL SERPL HS: 9 NG/L
CFFMA (FUNCTIONAL FIBRINOGEN MAX AMPLITUDE): 24 MM (ref 15–32)
CHLORIDE SERPL-SCNC: 107 MMOL/L (ref 100–108)
CHLORIDE SERPL-SCNC: 97 MMOL/L (ref 96–108)
CKLY30: 3.6 % (ref 0–2.6)
CKR(REACTION TIME): 4.7 MIN (ref 4.6–9.1)
CO2 SERPL-SCNC: 28 MMOL/L (ref 21–32)
CO2 SERPL-SCNC: 28 MMOL/L (ref 21–32)
COCAINE UR QL: NEGATIVE
CREAT SERPL-MCNC: 0.71 MG/DL (ref 0.6–1.3)
CREAT SERPL-MCNC: 0.75 MG/DL (ref 0.6–1.3)
CRTMA(RAPIDTEG MAX AMPLITUDE): 63.3 MM (ref 52–70)
EOSINOPHIL # BLD AUTO: 0.01 THOUSAND/ΜL (ref 0–0.61)
EOSINOPHIL # BLD AUTO: 0.03 THOUSAND/ΜL (ref 0–0.61)
EOSINOPHIL # BLD AUTO: 0.08 THOUSAND/ΜL (ref 0–0.61)
EOSINOPHIL NFR BLD AUTO: 0 % (ref 0–6)
EOSINOPHIL NFR BLD AUTO: 0 % (ref 0–6)
EOSINOPHIL NFR BLD AUTO: 1 % (ref 0–6)
ERYTHROCYTE [DISTWIDTH] IN BLOOD BY AUTOMATED COUNT: 14.4 % (ref 11.6–15.1)
ERYTHROCYTE [DISTWIDTH] IN BLOOD BY AUTOMATED COUNT: 14.5 % (ref 11.6–15.1)
ERYTHROCYTE [DISTWIDTH] IN BLOOD BY AUTOMATED COUNT: 14.5 % (ref 11.6–15.1)
ETHANOL SERPL-MCNC: <10 MG/DL
FERRITIN SERPL-MCNC: 34 NG/ML (ref 8–388)
GFR SERPL CREATININE-BSD FRML MDRD: 92 ML/MIN/1.73SQ M
GFR SERPL CREATININE-BSD FRML MDRD: 95 ML/MIN/1.73SQ M
GLUCOSE SERPL-MCNC: 100 MG/DL (ref 65–140)
GLUCOSE SERPL-MCNC: 114 MG/DL (ref 65–140)
HCT VFR BLD AUTO: 30.4 % (ref 36.5–49.3)
HCT VFR BLD AUTO: 31.1 % (ref 36.5–49.3)
HCT VFR BLD AUTO: 33.9 % (ref 36.5–49.3)
HGB BLD-MCNC: 10.6 G/DL (ref 12–17)
HGB BLD-MCNC: 9.2 G/DL (ref 12–17)
HGB BLD-MCNC: 9.8 G/DL (ref 12–17)
HGB BLD-MCNC: 9.9 G/DL (ref 12–17)
IGA SERPL-MCNC: 228 MG/DL (ref 70–400)
IMM GRANULOCYTES # BLD AUTO: 0.01 THOUSAND/UL (ref 0–0.2)
IMM GRANULOCYTES # BLD AUTO: 0.02 THOUSAND/UL (ref 0–0.2)
IMM GRANULOCYTES # BLD AUTO: 0.02 THOUSAND/UL (ref 0–0.2)
IMM GRANULOCYTES NFR BLD AUTO: 0 % (ref 0–2)
INR PPP: 1.06 (ref 0.84–1.19)
IRON SATN MFR SERPL: 38 % (ref 20–50)
IRON SERPL-MCNC: 105 UG/DL (ref 65–175)
LACTATE SERPL-SCNC: 1.1 MMOL/L (ref 0.5–2)
LIPASE SERPL-CCNC: 12 U/L (ref 11–82)
LYMPHOCYTES # BLD AUTO: 1.9 THOUSANDS/ΜL (ref 0.6–4.47)
LYMPHOCYTES # BLD AUTO: 2.2 THOUSANDS/ΜL (ref 0.6–4.47)
LYMPHOCYTES # BLD AUTO: 2.34 THOUSANDS/ΜL (ref 0.6–4.47)
LYMPHOCYTES NFR BLD AUTO: 22 % (ref 14–44)
LYMPHOCYTES NFR BLD AUTO: 24 % (ref 14–44)
LYMPHOCYTES NFR BLD AUTO: 28 % (ref 14–44)
MAGNESIUM SERPL-MCNC: 1.9 MG/DL (ref 1.9–2.7)
MCH RBC QN AUTO: 30.7 PG (ref 26.8–34.3)
MCH RBC QN AUTO: 30.8 PG (ref 26.8–34.3)
MCH RBC QN AUTO: 31.4 PG (ref 26.8–34.3)
MCHC RBC AUTO-ENTMCNC: 31.3 G/DL (ref 31.4–37.4)
MCHC RBC AUTO-ENTMCNC: 31.8 G/DL (ref 31.4–37.4)
MCHC RBC AUTO-ENTMCNC: 32.2 G/DL (ref 31.4–37.4)
MCV RBC AUTO: 97 FL (ref 82–98)
MCV RBC AUTO: 97 FL (ref 82–98)
MCV RBC AUTO: 99 FL (ref 82–98)
METHADONE UR QL: NEGATIVE
MONOCYTES # BLD AUTO: 0.69 THOUSAND/ΜL (ref 0.17–1.22)
MONOCYTES # BLD AUTO: 0.77 THOUSAND/ΜL (ref 0.17–1.22)
MONOCYTES # BLD AUTO: 0.9 THOUSAND/ΜL (ref 0.17–1.22)
MONOCYTES NFR BLD AUTO: 9 % (ref 4–12)
NEUTROPHILS # BLD AUTO: 4.84 THOUSANDS/ΜL (ref 1.85–7.62)
NEUTROPHILS # BLD AUTO: 5.82 THOUSANDS/ΜL (ref 1.85–7.62)
NEUTROPHILS # BLD AUTO: 6.38 THOUSANDS/ΜL (ref 1.85–7.62)
NEUTS SEG NFR BLD AUTO: 61 % (ref 43–75)
NEUTS SEG NFR BLD AUTO: 67 % (ref 43–75)
NEUTS SEG NFR BLD AUTO: 69 % (ref 43–75)
NRBC BLD AUTO-RTO: 0 /100 WBCS
OPIATES UR QL SCN: NEGATIVE
OXYCODONE+OXYMORPHONE UR QL SCN: NEGATIVE
PCP UR QL: NEGATIVE
PLATELET # BLD AUTO: 203 THOUSANDS/UL (ref 149–390)
PLATELET # BLD AUTO: 216 THOUSANDS/UL (ref 149–390)
PLATELET # BLD AUTO: 225 THOUSANDS/UL (ref 149–390)
PMV BLD AUTO: 10 FL (ref 8.9–12.7)
PMV BLD AUTO: 9.9 FL (ref 8.9–12.7)
PMV BLD AUTO: 9.9 FL (ref 8.9–12.7)
POTASSIUM SERPL-SCNC: 3.5 MMOL/L (ref 3.5–5.3)
POTASSIUM SERPL-SCNC: 3.6 MMOL/L (ref 3.5–5.3)
PROT SERPL-MCNC: 6.5 G/DL (ref 6.4–8.4)
PROTHROMBIN TIME: 13.7 SECONDS (ref 11.6–14.5)
RBC # BLD AUTO: 3.12 MILLION/UL (ref 3.88–5.62)
RBC # BLD AUTO: 3.22 MILLION/UL (ref 3.88–5.62)
RBC # BLD AUTO: 3.44 MILLION/UL (ref 3.88–5.62)
RH BLD: POSITIVE
SODIUM SERPL-SCNC: 136 MMOL/L (ref 135–147)
SODIUM SERPL-SCNC: 141 MMOL/L (ref 136–145)
SPECIMEN EXPIRATION DATE: NORMAL
SPECIMEN EXPIRATION DATE: NORMAL
THC UR QL: POSITIVE
TIBC SERPL-MCNC: 278 UG/DL (ref 250–450)
WBC # BLD AUTO: 7.86 THOUSAND/UL (ref 4.31–10.16)
WBC # BLD AUTO: 8.56 THOUSAND/UL (ref 4.31–10.16)
WBC # BLD AUTO: 9.68 THOUSAND/UL (ref 4.31–10.16)

## 2022-06-27 PROCEDURE — 85397 CLOTTING FUNCT ACTIVITY: CPT | Performed by: STUDENT IN AN ORGANIZED HEALTH CARE EDUCATION/TRAINING PROGRAM

## 2022-06-27 PROCEDURE — 85730 THROMBOPLASTIN TIME PARTIAL: CPT | Performed by: INTERNAL MEDICINE

## 2022-06-27 PROCEDURE — 86901 BLOOD TYPING SEROLOGIC RH(D): CPT | Performed by: STUDENT IN AN ORGANIZED HEALTH CARE EDUCATION/TRAINING PROGRAM

## 2022-06-27 PROCEDURE — 80053 COMPREHEN METABOLIC PANEL: CPT | Performed by: EMERGENCY MEDICINE

## 2022-06-27 PROCEDURE — 70450 CT HEAD/BRAIN W/O DYE: CPT

## 2022-06-27 PROCEDURE — 85610 PROTHROMBIN TIME: CPT | Performed by: INTERNAL MEDICINE

## 2022-06-27 PROCEDURE — 99223 1ST HOSP IP/OBS HIGH 75: CPT | Performed by: STUDENT IN AN ORGANIZED HEALTH CARE EDUCATION/TRAINING PROGRAM

## 2022-06-27 PROCEDURE — 99285 EMERGENCY DEPT VISIT HI MDM: CPT

## 2022-06-27 PROCEDURE — 83735 ASSAY OF MAGNESIUM: CPT | Performed by: EMERGENCY MEDICINE

## 2022-06-27 PROCEDURE — 83550 IRON BINDING TEST: CPT | Performed by: STUDENT IN AN ORGANIZED HEALTH CARE EDUCATION/TRAINING PROGRAM

## 2022-06-27 PROCEDURE — 85018 HEMOGLOBIN: CPT | Performed by: STUDENT IN AN ORGANIZED HEALTH CARE EDUCATION/TRAINING PROGRAM

## 2022-06-27 PROCEDURE — 80048 BASIC METABOLIC PNL TOTAL CA: CPT | Performed by: STUDENT IN AN ORGANIZED HEALTH CARE EDUCATION/TRAINING PROGRAM

## 2022-06-27 PROCEDURE — G1004 CDSM NDSC: HCPCS

## 2022-06-27 PROCEDURE — 85025 COMPLETE CBC W/AUTO DIFF WBC: CPT | Performed by: STUDENT IN AN ORGANIZED HEALTH CARE EDUCATION/TRAINING PROGRAM

## 2022-06-27 PROCEDURE — 86900 BLOOD TYPING SEROLOGIC ABO: CPT | Performed by: STUDENT IN AN ORGANIZED HEALTH CARE EDUCATION/TRAINING PROGRAM

## 2022-06-27 PROCEDURE — NC001 PR NO CHARGE: Performed by: STUDENT IN AN ORGANIZED HEALTH CARE EDUCATION/TRAINING PROGRAM

## 2022-06-27 PROCEDURE — 70486 CT MAXILLOFACIAL W/O DYE: CPT

## 2022-06-27 PROCEDURE — 36415 COLL VENOUS BLD VENIPUNCTURE: CPT | Performed by: EMERGENCY MEDICINE

## 2022-06-27 PROCEDURE — 85025 COMPLETE CBC W/AUTO DIFF WBC: CPT | Performed by: EMERGENCY MEDICINE

## 2022-06-27 PROCEDURE — 85576 BLOOD PLATELET AGGREGATION: CPT | Performed by: STUDENT IN AN ORGANIZED HEALTH CARE EDUCATION/TRAINING PROGRAM

## 2022-06-27 PROCEDURE — 99223 1ST HOSP IP/OBS HIGH 75: CPT | Performed by: SURGERY

## 2022-06-27 PROCEDURE — 82728 ASSAY OF FERRITIN: CPT | Performed by: STUDENT IN AN ORGANIZED HEALTH CARE EDUCATION/TRAINING PROGRAM

## 2022-06-27 PROCEDURE — 83690 ASSAY OF LIPASE: CPT | Performed by: EMERGENCY MEDICINE

## 2022-06-27 PROCEDURE — 71045 X-RAY EXAM CHEST 1 VIEW: CPT

## 2022-06-27 PROCEDURE — 72125 CT NECK SPINE W/O DYE: CPT

## 2022-06-27 PROCEDURE — 86850 RBC ANTIBODY SCREEN: CPT | Performed by: STUDENT IN AN ORGANIZED HEALTH CARE EDUCATION/TRAINING PROGRAM

## 2022-06-27 PROCEDURE — C9113 INJ PANTOPRAZOLE SODIUM, VIA: HCPCS | Performed by: STUDENT IN AN ORGANIZED HEALTH CARE EDUCATION/TRAINING PROGRAM

## 2022-06-27 PROCEDURE — 82077 ASSAY SPEC XCP UR&BREATH IA: CPT | Performed by: EMERGENCY MEDICINE

## 2022-06-27 PROCEDURE — 85384 FIBRINOGEN ACTIVITY: CPT | Performed by: STUDENT IN AN ORGANIZED HEALTH CARE EDUCATION/TRAINING PROGRAM

## 2022-06-27 PROCEDURE — 96361 HYDRATE IV INFUSION ADD-ON: CPT

## 2022-06-27 PROCEDURE — 96366 THER/PROPH/DIAG IV INF ADDON: CPT

## 2022-06-27 PROCEDURE — 84484 ASSAY OF TROPONIN QUANT: CPT | Performed by: EMERGENCY MEDICINE

## 2022-06-27 PROCEDURE — 85347 COAGULATION TIME ACTIVATED: CPT | Performed by: STUDENT IN AN ORGANIZED HEALTH CARE EDUCATION/TRAINING PROGRAM

## 2022-06-27 PROCEDURE — 83605 ASSAY OF LACTIC ACID: CPT | Performed by: EMERGENCY MEDICINE

## 2022-06-27 PROCEDURE — 96365 THER/PROPH/DIAG IV INF INIT: CPT

## 2022-06-27 PROCEDURE — 80307 DRUG TEST PRSMV CHEM ANLYZR: CPT | Performed by: STUDENT IN AN ORGANIZED HEALTH CARE EDUCATION/TRAINING PROGRAM

## 2022-06-27 PROCEDURE — 82784 ASSAY IGA/IGD/IGG/IGM EACH: CPT | Performed by: INTERNAL MEDICINE

## 2022-06-27 PROCEDURE — C9113 INJ PANTOPRAZOLE SODIUM, VIA: HCPCS | Performed by: EMERGENCY MEDICINE

## 2022-06-27 PROCEDURE — 83540 ASSAY OF IRON: CPT | Performed by: STUDENT IN AN ORGANIZED HEALTH CARE EDUCATION/TRAINING PROGRAM

## 2022-06-27 PROCEDURE — 99285 EMERGENCY DEPT VISIT HI MDM: CPT | Performed by: EMERGENCY MEDICINE

## 2022-06-27 PROCEDURE — 73030 X-RAY EXAM OF SHOULDER: CPT

## 2022-06-27 RX ORDER — ACETAMINOPHEN 325 MG/1
975 TABLET ORAL EVERY 6 HOURS PRN
Status: DISCONTINUED | OUTPATIENT
Start: 2022-06-27 | End: 2022-07-04 | Stop reason: HOSPADM

## 2022-06-27 RX ORDER — ONDANSETRON 2 MG/ML
4 INJECTION INTRAMUSCULAR; INTRAVENOUS EVERY 6 HOURS PRN
Status: DISCONTINUED | OUTPATIENT
Start: 2022-06-27 | End: 2022-06-27

## 2022-06-27 RX ORDER — PANTOPRAZOLE SODIUM 40 MG/10ML
INJECTION, POWDER, LYOPHILIZED, FOR SOLUTION INTRAVENOUS
Status: COMPLETED
Start: 2022-06-27 | End: 2022-06-27

## 2022-06-27 RX ORDER — ONDANSETRON 2 MG/ML
4 INJECTION INTRAMUSCULAR; INTRAVENOUS EVERY 6 HOURS PRN
Status: DISCONTINUED | OUTPATIENT
Start: 2022-06-27 | End: 2022-07-04 | Stop reason: HOSPADM

## 2022-06-27 RX ORDER — SODIUM CHLORIDE 9 MG/ML
125 INJECTION, SOLUTION INTRAVENOUS CONTINUOUS
Status: DISCONTINUED | OUTPATIENT
Start: 2022-06-27 | End: 2022-06-27

## 2022-06-27 RX ORDER — DULOXETIN HYDROCHLORIDE 60 MG/1
60 CAPSULE, DELAYED RELEASE ORAL DAILY
Status: DISCONTINUED | OUTPATIENT
Start: 2022-06-28 | End: 2022-07-04 | Stop reason: HOSPADM

## 2022-06-27 RX ORDER — LEVETIRACETAM 500 MG/1
500 TABLET ORAL EVERY 12 HOURS SCHEDULED
Status: COMPLETED | OUTPATIENT
Start: 2022-06-27 | End: 2022-07-04

## 2022-06-27 RX ORDER — TERBINAFINE HYDROCHLORIDE 250 MG/1
250 TABLET ORAL DAILY
COMMUNITY

## 2022-06-27 RX ORDER — MELOXICAM 15 MG/1
15 TABLET ORAL DAILY
COMMUNITY
End: 2022-07-04

## 2022-06-27 RX ORDER — METOPROLOL SUCCINATE 100 MG/1
100 TABLET, EXTENDED RELEASE ORAL DAILY
COMMUNITY

## 2022-06-27 RX ORDER — METOCLOPRAMIDE HYDROCHLORIDE 5 MG/ML
10 INJECTION INTRAMUSCULAR; INTRAVENOUS ONCE
Status: COMPLETED | OUTPATIENT
Start: 2022-06-27 | End: 2022-06-27

## 2022-06-27 RX ORDER — DULOXETIN HYDROCHLORIDE 60 MG/1
60 CAPSULE, DELAYED RELEASE ORAL DAILY
COMMUNITY

## 2022-06-27 RX ORDER — PANTOPRAZOLE SODIUM 40 MG/1
40 INJECTION, POWDER, FOR SOLUTION INTRAVENOUS EVERY 12 HOURS
Status: CANCELLED | OUTPATIENT
Start: 2022-06-27

## 2022-06-27 RX ORDER — ONDANSETRON 2 MG/ML
4 INJECTION INTRAMUSCULAR; INTRAVENOUS EVERY 6 HOURS PRN
Status: DISCONTINUED | OUTPATIENT
Start: 2022-06-27 | End: 2022-06-27 | Stop reason: HOSPADM

## 2022-06-27 RX ORDER — ONDANSETRON 2 MG/ML
4 INJECTION INTRAMUSCULAR; INTRAVENOUS EVERY 6 HOURS PRN
Status: CANCELLED | OUTPATIENT
Start: 2022-06-27

## 2022-06-27 RX ORDER — ATORVASTATIN CALCIUM 40 MG/1
40 TABLET, FILM COATED ORAL DAILY
Status: DISCONTINUED | OUTPATIENT
Start: 2022-06-28 | End: 2022-07-04 | Stop reason: HOSPADM

## 2022-06-27 RX ORDER — PANTOPRAZOLE SODIUM 40 MG/10ML
40 INJECTION, POWDER, LYOPHILIZED, FOR SOLUTION INTRAVENOUS EVERY 12 HOURS
Status: DISCONTINUED | OUTPATIENT
Start: 2022-06-27 | End: 2022-06-28

## 2022-06-27 RX ORDER — OMEPRAZOLE 40 MG/1
40 CAPSULE, DELAYED RELEASE ORAL DAILY
COMMUNITY
End: 2022-07-04

## 2022-06-27 RX ORDER — SODIUM CHLORIDE 9 MG/ML
125 INJECTION, SOLUTION INTRAVENOUS CONTINUOUS
Status: DISCONTINUED | OUTPATIENT
Start: 2022-06-27 | End: 2022-06-27 | Stop reason: HOSPADM

## 2022-06-27 RX ORDER — GABAPENTIN 300 MG/1
300 CAPSULE ORAL DAILY
Status: DISCONTINUED | OUTPATIENT
Start: 2022-06-28 | End: 2022-07-04 | Stop reason: HOSPADM

## 2022-06-27 RX ORDER — SODIUM CHLORIDE, SODIUM LACTATE, POTASSIUM CHLORIDE, CALCIUM CHLORIDE 600; 310; 30; 20 MG/100ML; MG/100ML; MG/100ML; MG/100ML
125 INJECTION, SOLUTION INTRAVENOUS CONTINUOUS
Status: DISCONTINUED | OUTPATIENT
Start: 2022-06-27 | End: 2022-06-28

## 2022-06-27 RX ORDER — SODIUM CHLORIDE 9 MG/ML
125 INJECTION, SOLUTION INTRAVENOUS CONTINUOUS
Status: CANCELLED | OUTPATIENT
Start: 2022-06-27

## 2022-06-27 RX ORDER — FOLIC ACID 1 MG/1
1 TABLET ORAL DAILY
COMMUNITY

## 2022-06-27 RX ORDER — ACETAMINOPHEN 650 MG/1
325 SUPPOSITORY RECTAL EVERY 4 HOURS PRN
Status: DISCONTINUED | OUTPATIENT
Start: 2022-06-27 | End: 2022-06-27

## 2022-06-27 RX ORDER — PANTOPRAZOLE SODIUM 40 MG/1
40 INJECTION, POWDER, FOR SOLUTION INTRAVENOUS EVERY 12 HOURS
Status: DISCONTINUED | OUTPATIENT
Start: 2022-06-27 | End: 2022-06-27 | Stop reason: HOSPADM

## 2022-06-27 RX ORDER — MELATONIN
50000 WEEKLY
COMMUNITY

## 2022-06-27 RX ORDER — NICOTINE 21 MG/24HR
1 PATCH, TRANSDERMAL 24 HOURS TRANSDERMAL DAILY
Status: DISCONTINUED | OUTPATIENT
Start: 2022-06-28 | End: 2022-07-04 | Stop reason: HOSPADM

## 2022-06-27 RX ORDER — ONDANSETRON 2 MG/ML
1 INJECTION INTRAMUSCULAR; INTRAVENOUS ONCE
Status: COMPLETED | OUTPATIENT
Start: 2022-06-27 | End: 2022-06-27

## 2022-06-27 RX ORDER — METOPROLOL SUCCINATE 100 MG/1
100 TABLET, EXTENDED RELEASE ORAL DAILY
Status: DISCONTINUED | OUTPATIENT
Start: 2022-06-28 | End: 2022-07-04 | Stop reason: HOSPADM

## 2022-06-27 RX ADMIN — Medication 8 MG/HR: at 03:21

## 2022-06-27 RX ADMIN — ACETAMINOPHEN 975 MG: 325 TABLET, FILM COATED ORAL at 21:01

## 2022-06-27 RX ADMIN — NICOTINE 7 MG/24 HR DAILY TRANSDERMAL PATCH 1 PATCH: at 09:55

## 2022-06-27 RX ADMIN — LEVETIRACETAM 500 MG: 500 TABLET, FILM COATED ORAL at 22:58

## 2022-06-27 RX ADMIN — Medication 1500 UNITS: at 15:35

## 2022-06-27 RX ADMIN — SODIUM CHLORIDE, SODIUM LACTATE, POTASSIUM CHLORIDE, AND CALCIUM CHLORIDE 125 ML/HR: .6; .31; .03; .02 INJECTION, SOLUTION INTRAVENOUS at 18:26

## 2022-06-27 RX ADMIN — PANTOPRAZOLE SODIUM 40 MG: 40 INJECTION, POWDER, FOR SOLUTION INTRAVENOUS at 21:01

## 2022-06-27 RX ADMIN — Medication 80 MG: at 03:06

## 2022-06-27 RX ADMIN — SODIUM CHLORIDE 125 ML/HR: 0.9 INJECTION, SOLUTION INTRAVENOUS at 09:56

## 2022-06-27 RX ADMIN — SODIUM CHLORIDE 1000 ML: 0.9 INJECTION, SOLUTION INTRAVENOUS at 02:29

## 2022-06-27 RX ADMIN — METOCLOPRAMIDE HYDROCHLORIDE 10 MG: 5 INJECTION INTRAMUSCULAR; INTRAVENOUS at 12:12

## 2022-06-27 NOTE — NURSING NOTE
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

## 2022-06-27 NOTE — NURSING NOTE
Patient, along with his belongings and necessary paperwork prepared for transfer to [facility] via [facility] with report given to paramedic Fiordaliza Ricks

## 2022-06-27 NOTE — PLAN OF CARE
Problem: MOBILITY - ADULT  Goal: Maintain or return to baseline ADL function  Description: INTERVENTIONS:  -  Assess patient's ability to carry out ADLs; assess patient's baseline for ADL function and identify physical deficits which impact ability to perform ADLs (bathing, care of mouth/teeth, toileting, grooming, dressing, etc )  - Assess/evaluate cause of self-care deficits   - Assess range of motion  - Assess patient's mobility; develop plan if impaired  - Assess patient's need for assistive devices and provide as appropriate  - Encourage maximum independence but intervene and supervise when necessary  - Involve family in performance of ADLs  - Assess for home care needs following discharge   - Consider OT consult to assist with ADL evaluation and planning for discharge  - Provide patient education as appropriate  Outcome: Progressing  Goal: Maintains/Returns to pre admission functional level  Description: INTERVENTIONS:  - Perform BMAT or MOVE assessment daily    - Set and communicate daily mobility goal to care team and patient/family/caregiver  - Collaborate with rehabilitation services on mobility goals if consulted  - Perform Range of Motion 3 times a day  - Reposition patient every 2 hours  - Dangle patient 3 times a day  - Stand patient 3 times a day  - Ambulate patient 3 times a day  - Out of bed to chair 3 times a day   - Out of bed for meals 3 times a day  - Out of bed for toileting  - Record patient progress and toleration of activity level   Outcome: Progressing     Problem: Nutrition/Hydration-ADULT  Goal: Nutrient/Hydration intake appropriate for improving, restoring or maintaining nutritional needs  Description: Monitor and assess patient's nutrition/hydration status for malnutrition  Collaborate with interdisciplinary team and initiate plan and interventions as ordered  Monitor patient's weight and dietary intake as ordered or per policy   Utilize nutrition screening tool and intervene as necessary  Determine patient's food preferences and provide high-protein, high-caloric foods as appropriate       INTERVENTIONS:  - Monitor oral intake, urinary output, labs, and treatment plans  - Assess nutrition and hydration status and recommend course of action  - Evaluate amount of meals eaten  - Assist patient with eating if necessary   - Allow adequate time for meals  - Recommend/ encourage appropriate diets, oral nutritional supplements, and vitamin/mineral supplements  - Order, calculate, and assess calorie counts as needed  - Recommend, monitor, and adjust tube feedings and TPN/PPN based on assessed needs  - Assess need for intravenous fluids  - Provide specific nutrition/hydration education as appropriate  - Include patient/family/caregiver in decisions related to nutrition  Outcome: Progressing     Problem: Prexisting or High Potential for Compromised Skin Integrity  Goal: Skin integrity is maintained or improved  Description: INTERVENTIONS:  - Identify patients at risk for skin breakdown  - Assess and monitor skin integrity  - Assess and monitor nutrition and hydration status  - Monitor labs   - Assess for incontinence   - Turn and reposition patient  - Assist with mobility/ambulation  - Relieve pressure over bony prominences  - Avoid friction and shearing  - Provide appropriate hygiene as needed including keeping skin clean and dry  - Evaluate need for skin moisturizer/barrier cream  - Collaborate with interdisciplinary team   - Patient/family teaching  - Consider wound care consult   Outcome: Progressing     Problem: Potential for Falls  Goal: Patient will remain free of falls  Description: INTERVENTIONS:  - Educate patient/family on patient safety including physical limitations  - Instruct patient to call for assistance with activity   - Consult OT/PT to assist with strengthening/mobility   - Keep Call bell within reach  - Keep bed low and locked with side rails adjusted as appropriate  - Keep care items and personal belongings within reach  - Initiate and maintain comfort rounds  - Make Fall Risk Sign visible to staff  - Offer Toileting every 2 Hours, in advance of need  - Initiate/Maintain bed alarm  - Obtain necessary fall risk management equipment: call bell in reach, alarm activated  - Apply yellow socks and bracelet for high fall risk patients  - Consider moving patient to room near nurses station  Outcome: Progressing

## 2022-06-27 NOTE — INCIDENTAL FINDINGS
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

## 2022-06-27 NOTE — ASSESSMENT & PLAN NOTE
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

## 2022-06-27 NOTE — ED PROVIDER NOTES
History  Chief Complaint   Patient presents with    Vomiting Blood     Ems states pt is vomitting blood and known meth user       80YEAR-OLD MALE    Chronic meth abuser  History of upper GI bleed    Chief complaint:  Coffee-ground emesis      No lower GI symptoms  NO FEVERS OR CHILLS      INTERVENTIONS:  NONE      BLOOD THINNERS:    PATIENT IS NOT ON ANY BLOOD THINNERS      HISTORY OF GI BLEED:  H/o UGIB      HISTORY OF LIVER DISEASE:  NONE    ASSOCIATED SYMPTOMS:  PATIENT HAS NO CHEST PAIN OR SHORTNESS OF BREATH  NO DIZZINESS  NO SYNCOPE OR NEAR SYNCOPE      URINARY  SYMPTOMS: THERE IS NO DYSURIA, NO HEMATURIA, NO FREQUENCY      ALLEVIATING OR EXACERBATING FACTORS:    none      History provided by:  Patient  Vomiting  Severity:  Severe  Quality:  Coffee grounds  Progression:  Partially resolved  Relieved by:  Nothing  Worsened by:  Nothing  Ineffective treatments:  None tried  Associated symptoms: no abdominal pain, no arthralgias, no chills, no cough, no diarrhea, no fever, no headaches, no myalgias, no sore throat and no URI        Prior to Admission Medications   Prescriptions Last Dose Informant Patient Reported? Taking?   atorvastatin (LIPITOR) 40 mg tablet   Yes No   Sig: Take 40 mg by mouth daily   Patient not taking: Reported on 6/27/2022   gabapentin (NEURONTIN) 600 MG tablet  Pharmacy (Specify) No No   Sig: Take 1 tablet (600 mg total) by mouth 3 (three) times a day   Patient taking differently: Take 300 mg by mouth daily      Facility-Administered Medications: None       Past Medical History:   Diagnosis Date    Hypertension        Past Surgical History:   Procedure Laterality Date    HAND SURGERY         Family History   Problem Relation Age of Onset    No Known Problems Mother     Hypertension Father      I have reviewed and agree with the history as documented      E-Cigarette/Vaping    E-Cigarette Use Never User      E-Cigarette/Vaping Substances     Social History     Tobacco Use    Smoking status: Current Every Day Smoker     Packs/day: 0 50    Smokeless tobacco: Never Used   Vaping Use    Vaping Use: Never used   Substance Use Topics    Alcohol use: Yes     Comment: occassional    Drug use: Never       Review of Systems   Constitutional: Negative for chills, diaphoresis, fatigue and fever  HENT: Negative for sore throat  Respiratory: Negative for cough, shortness of breath, wheezing and stridor  Cardiovascular: Negative for chest pain, palpitations and leg swelling  Gastrointestinal: Negative for abdominal pain, blood in stool, diarrhea, nausea and vomiting  Coffee-ground emesis   Genitourinary: Negative for difficulty urinating, dysuria, flank pain and frequency  Musculoskeletal: Negative for arthralgias, back pain, gait problem, joint swelling, myalgias, neck pain and neck stiffness  Skin: Negative for rash and wound  Neurological: Negative for dizziness, light-headedness and headaches  All other systems reviewed and are negative  Physical Exam  Physical Exam  Constitutional:       General: He is not in acute distress  Appearance: Normal appearance  He is well-developed  He is ill-appearing  He is not toxic-appearing or diaphoretic  Comments: Unkempt  Filthy appearing   HENT:      Head: Normocephalic and atraumatic  Right Ear: External ear normal       Left Ear: External ear normal       Nose: Nose normal    Eyes:      General: No scleral icterus  Right eye: No discharge  Left eye: No discharge  Extraocular Movements: Extraocular movements intact  Conjunctiva/sclera: Conjunctivae normal       Pupils: Pupils are equal, round, and reactive to light  Neck:      Vascular: No JVD  Trachea: No tracheal deviation  Cardiovascular:      Rate and Rhythm: Regular rhythm  Tachycardia present  Pulses: Normal pulses  Heart sounds: Normal heart sounds  No murmur heard  No friction rub  No gallop     Pulmonary: Effort: Pulmonary effort is normal  No respiratory distress  Breath sounds: Normal breath sounds  No stridor  No wheezing, rhonchi or rales  Chest:      Chest wall: No tenderness  Abdominal:      General: Bowel sounds are normal  There is no distension  Palpations: Abdomen is soft  There is no mass  Tenderness: There is no abdominal tenderness  There is no right CVA tenderness, left CVA tenderness, guarding or rebound  Hernia: No hernia is present  Musculoskeletal:         General: No swelling, tenderness, deformity or signs of injury  Normal range of motion  Cervical back: Normal range of motion and neck supple  No rigidity or tenderness  Right lower leg: No edema  Left lower leg: No edema  Lymphadenopathy:      Cervical: No cervical adenopathy  Skin:     General: Skin is warm  Capillary Refill: Capillary refill takes less than 2 seconds  Coloration: Skin is not jaundiced or pale  Findings: No bruising, erythema, lesion or rash  Neurological:      General: No focal deficit present  Mental Status: He is alert and oriented to person, place, and time  Cranial Nerves: No cranial nerve deficit  Sensory: No sensory deficit  Motor: No weakness or abnormal muscle tone  Coordination: Coordination normal       Comments: Left hemiplegia, from old stroke  Stable  Psychiatric:         Behavior: Behavior normal          Thought Content:  Thought content normal          Judgment: Judgment normal       Comments: Intoxicated affect         Vital Signs  ED Triage Vitals   Temperature Pulse Respirations Blood Pressure SpO2   06/27/22 0225 06/27/22 0224 06/27/22 0224 06/27/22 0224 06/27/22 0224   98 5 °F (36 9 °C) 72 18 130/72 93 %      Temp Source Heart Rate Source Patient Position - Orthostatic VS BP Location FiO2 (%)   06/27/22 0225 06/27/22 0224 06/27/22 0224 06/27/22 0224 --   Tympanic Monitor Lying Right arm       Pain Score 06/27/22 0224       4           Vitals:    06/27/22 0400 06/27/22 0600 06/27/22 0700 06/27/22 0800   BP: 156/77 145/79 155/80 146/72   Pulse: 69 65 61 58   Patient Position - Orthostatic VS: Lying Lying           Visual Acuity      ED Medications  Medications   pantoprazole (PROTONIX) 80 mg in sodium chloride 0 9 % 100 mL IVPB (0 mg Intravenous Stopped 6/27/22 0326)   sodium chloride 0 9 % bolus 1,000 mL (0 mL Intravenous Stopped 6/27/22 0707)   ondansetron (FOR EMS ONLY) (ZOFRAN) 4 mg/2 mL injection 4 mg (0 mg Does not apply Given to EMS 6/27/22 0236)   pantoprazole (PROTONIX) 40 mg injection **ADS Override Pull** (  Override Pull 6/27/22 0334)   pantoprazole (PROTONIX) 40 mg injection **ADS Override Pull** (  Override Pull 6/27/22 0334)       Diagnostic Studies  Results Reviewed     Procedure Component Value Units Date/Time    HS Troponin I 4hr [[id number]]  (Normal) Collected: 06/27/22 0706    Lab Status: Final result Specimen: Blood from Arm, Right Updated: 06/27/22 0735     hs TnI 4hr 9 ng/L      Delta 4hr hsTnI 1 ng/L     CBC and differential [[id number]]  (Abnormal) Collected: 06/27/22 0706    Lab Status: Final result Specimen: Blood from Arm, Right Updated: 06/27/22 0717     WBC 8 56 Thousand/uL      RBC 3 12 Million/uL      Hemoglobin 9 8 g/dL      Hematocrit 30 4 %      MCV 97 fL      MCH 31 4 pg      MCHC 32 2 g/dL      RDW 14 4 %      MPV 9 9 fL      Platelets 493 Thousands/uL      nRBC 0 /100 WBCs      Neutrophils Relative 69 %      Immat GRANS % 0 %      Lymphocytes Relative 22 %      Monocytes Relative 9 %      Eosinophils Relative 0 %      Basophils Relative 0 %      Neutrophils Absolute 5 82 Thousands/µL      Immature Grans Absolute 0 02 Thousand/uL      Lymphocytes Absolute 1 90 Thousands/µL      Monocytes Absolute 0 77 Thousand/µL      Eosinophils Absolute 0 03 Thousand/µL      Basophils Absolute 0 02 Thousands/µL     HS Troponin I 2hr [[id number]]  (Normal) Collected: 06/27/22 0510    Lab Status: Final result Specimen: Blood from Arm, Right Updated: 06/27/22 0538     hs TnI 2hr 9 ng/L      Delta 2hr hsTnI 1 ng/L     HS Troponin 0hr (reflex protocol) [[id number]]  (Normal) Collected: 06/27/22 0230    Lab Status: Final result Specimen: Blood from Arm, Right Updated: 06/27/22 0302     hs TnI 0hr 8 ng/L     Ethanol [[id number]]  (Normal) Collected: 06/27/22 0230    Lab Status: Final result Specimen: Blood from Arm, Right Updated: 06/27/22 0301     Ethanol Lvl <10 mg/dL     Lactic acid [[id number]]  (Normal) Collected: 06/27/22 0230    Lab Status: Final result Specimen: Blood from Arm, Right Updated: 06/27/22 0258     LACTIC ACID 1 1 mmol/L     Narrative:      Result may be elevated if tourniquet was used during collection      CMP [[id number]] Collected: 06/27/22 0230    Lab Status: Final result Specimen: Blood from Arm, Right Updated: 06/27/22 0258     Sodium 136 mmol/L      Potassium 3 5 mmol/L      Chloride 97 mmol/L      CO2 28 mmol/L      ANION GAP 11 mmol/L      BUN 23 mg/dL      Creatinine 0 75 mg/dL      Glucose 114 mg/dL      Calcium 9 2 mg/dL      AST 21 U/L      ALT 11 U/L      Alkaline Phosphatase 75 U/L      Total Protein 6 5 g/dL      Albumin 4 0 g/dL      Total Bilirubin 0 73 mg/dL      eGFR 92 ml/min/1 73sq m     Narrative:      Anton guidelines for Chronic Kidney Disease (CKD):     Stage 1 with normal or high GFR (GFR > 90 mL/min/1 73 square meters)    Stage 2 Mild CKD (GFR = 60-89 mL/min/1 73 square meters)    Stage 3A Moderate CKD (GFR = 45-59 mL/min/1 73 square meters)    Stage 3B Moderate CKD (GFR = 30-44 mL/min/1 73 square meters)    Stage 4 Severe CKD (GFR = 15-29 mL/min/1 73 square meters)    Stage 5 End Stage CKD (GFR <15 mL/min/1 73 square meters)  Note: GFR calculation is accurate only with a steady state creatinine    Lipase [[id number]]  (Normal) Collected: 06/27/22 0230    Lab Status: Final result Specimen: Blood from Arm, Right Updated: 06/27/22 7240 Lipase 12 u/L     Magnesium [[id number]]  (Normal) Collected: 06/27/22 0230    Lab Status: Final result Specimen: Blood from Arm, Right Updated: 06/27/22 0258     Magnesium 1 9 mg/dL     CBC and differential [[id number]]  (Abnormal) Collected: 06/27/22 0230    Lab Status: Final result Specimen: Blood from Arm, Right Updated: 06/27/22 0237     WBC 9 68 Thousand/uL      RBC 3 44 Million/uL      Hemoglobin 10 6 g/dL      Hematocrit 33 9 %      MCV 99 fL      MCH 30 8 pg      MCHC 31 3 g/dL      RDW 14 5 %      MPV 9 9 fL      Platelets 049 Thousands/uL      nRBC 0 /100 WBCs      Neutrophils Relative 67 %      Immat GRANS % 0 %      Lymphocytes Relative 24 %      Monocytes Relative 9 %      Eosinophils Relative 0 %      Basophils Relative 0 %      Neutrophils Absolute 6 38 Thousands/µL      Immature Grans Absolute 0 02 Thousand/uL      Lymphocytes Absolute 2 34 Thousands/µL      Monocytes Absolute 0 90 Thousand/µL      Eosinophils Absolute 0 01 Thousand/µL      Basophils Absolute 0 03 Thousands/µL                  XR chest 1 view portable   ED Interpretation by Brad Broussard MD (06/27 0228)   Comparison: 2017    EXAM PERFORMED/VIEWS:  XR CHEST Portable        FINDINGS:     Cardiomediastinal silhouette appears unremarkable      The lungs are clear  No pneumothorax or pleural effusion      Visualized osseous structures appear unremarkable for the patient's age      IMPRESSION:     No focal consolidation, pleural effusion, or pneumothorax                  Final Result by Marie Torres MD (06/27 6591)         1  No acute cardiopulmonary disease noted  2   Tortuous thoracic aorta with possible mild aneurysmal dilatation of descending aorta  Workstation performed: [id number]                    Procedures  Procedures         ED Course  ED Course as of 06/27/22 2123 Mon Jun 27, 2022 0225 Patient seen evaluated     has patient stable appearing    Vital signs stable very is here for upper GI bleed  Protonix ordered   0226 CBC and differential(!)   0250 Care everywhere  5 1 22   Hgb 11 9   0305 hs TnI 0hr: 8   0305 MEDICAL ALCOHOL: <10   0305 Magnesium: 1 9   0305 LACTIC ACID: 1 1   0305 Lipase: 12   0305 CMP   0313 Tiger texted Dr Clarice Sow, on for GI  I reviewed the pt's stability and presentation c/w UGIB   0313 Pt willing to transfer due to bed capacity   0346 Dw Brodie Gleason w/ YUE   Reviewed the case  Dr Ravindra Jenkins is accepting                                             MDM    Disposition  Final diagnoses:   Upper GI bleed     Time reflects when diagnosis was documented in both MDM as applicable and the Disposition within this note     Time User Action Codes Description Comment    6/27/2022  3:37 AM Kelle Kincaid Add [K92 2] Upper GI bleed       ED Disposition     ED Disposition   Transfer to Another Facility-In Network    Condition   --    Date/Time   Mon Jun 27, 2022  3:37 AM    Comment   Everardo Degroot should be transferred out to [facility]           MD Documentation    [address] Most Recent Value   Patient Condition The patient has been stabilized such that within reasonable medical probability, no material deterioration of the patient condition or the condition of the unborn child(tere) is likely to result from the transfer   Reason for Transfer Other (Include comment)____________________   Benefits of Transfer Other benefits (Include comment)_______________________   Risks of Transfer Potential for delay in receiving treatment, Potential deterioration of medical condition, Loss of IV, Increased discomfort during transfer, Possible worsening of condition or death during transfer   Accepting Physician Dr Evangelist Power Name, Harish Antony   Sending MD Ruth James DO   Provider Certification General risk, such as traffic hazards, adverse weather conditions, rough terrain or turbulence, possible failure of equipment (including vehicle or aircraft), or consequences of actions of persons outside the control of the transport personnel, Risk of worsening condition, The possibility of a transport vehicle being unavailable, Unanticipated needs of medical equipment and personnel during transport      RN Documentation    [address] Yvette Haque Name, [address]      Follow-up Information    None         Discharge Medication List as of 6/27/2022  8:31 AM      CONTINUE these medications which have NOT CHANGED    Details   atorvastatin (LIPITOR) 40 mg tablet Take 40 mg by mouth daily, Historical Med      gabapentin (NEURONTIN) 600 MG tablet Take 1 tablet (600 mg total) by mouth 3 (three) times a day, Starting Thu 6/11/2020, Until Sat 7/11/2020, Normal      amLODIPine (NORVASC) 2 5 mg tablet Take 5 mg by mouth daily, Historical Med      aspirin (ECOTRIN LOW STRENGTH) 81 mg EC tablet Take 81 mg by mouth daily, Historical Med      baclofen 10 mg tablet Take 10 mg by mouth 2 (two) times a day, Historical Med      famotidine (PEPCID) 20 mg tablet Take 20 mg by mouth 2 (two) times a day, Historical Med      HYDROcodone-acetaminophen (NORCO) 5-325 mg per tablet Take 1 tablet by mouth 2 (two) times a day as needed for pain 1 pill twice daily if needed for pain  Do not fill until 7/9/2020Max Daily Amount: 2 tablets, Starting Thu 6/11/2020, Until Sat 7/11/2020, Normal      methylPREDNISolone 4 MG tablet therapy pack Use as directed on package, Normal      sertraline (ZOLOFT) 50 mg tablet Take 50 mg by mouth daily, Historical Med             No discharge procedures on file      PDMP Review       Value Time User    PDMP Reviewed  Yes 6/11/2020 11:52 AM Bya Burt          ED Provider  Electronically Signed by           Maria E House MD  06/27/22 9200

## 2022-06-27 NOTE — ASSESSMENT & PLAN NOTE
Patient reports history of coffee-ground emesis that has been going on for about a week, last episode in EMS on his way to [address] [place] [address]  Keep patient NPO, hold Eliquis  Check vitamin B12 and folate levels  IV fluid hydration  PPI q 12 hours  H&H q 8 hours, transfuse if hemoglobin below 7  GI consulted  Plan for EGD

## 2022-06-27 NOTE — H&P
[facility] 1950[facility] 80 [facility]  [facility] MRN: [id number]  Unit/Bed#: -01 Encounter: [id number]  Primary Care Provider: Jose Murillo DO   Date and time admitted to hospital: 6/27/2022  9:19 AM    * GIB (gastrointestinal bleeding)  Assessment & Plan  Patient reports history of coffee-ground emesis that has been going on for about a week, last episode in EMS on his way to [address] [place] [address]  Keep patient NPO, hold Eliquis  Check vitamin B12 and folate levels  IV fluid hydration  PPI q 12 hours  H&H q 8 hours, transfuse if hemoglobin below 7  GI consulted  Plan for EGD    Drowsiness  Assessment & Plan  Patient noted to be drowsy on initial encounter seeming almost intoxicated  ETOH negative  U tox pending  Spoke with patient's sister who noted that the patient had fallen at home 3 days ago  Obtain CT scan of his head, CT cervical spine, CT facial bones, x-ray of his right shoulder    Tobacco use  Assessment & Plan  Advised smoking cessation  Nicotine patch ordered    Major depressive disorder  Assessment & Plan  Patient on sertraline at home    GERD (gastroesophageal reflux disease)  Assessment & Plan  Continue PPI q 12    Essential hypertension  Assessment & Plan  Hold prior to admission amlodipine 5 mg q day given NPO status  Continue to monitor blood pressure, acceptable and within normal limits at this time    Hemiplegia, post-stroke [facility])  Assessment & Plan  · Aspirin and atorvastatin are on hold given NPO status  · Appreciate PT and OT recommendations    Chronic pain syndrome  Assessment & Plan  Hold PTA gabapentin 600 mg TID given NPO status    VTE Pharmacologic Prophylaxis:   Moderate Risk (Score 3-4) - Pharmacological DVT Prophylaxis Contraindicated  Sequential Compression Devices Ordered  Code Status: Level 1 - Full Code discussed with patient  Discussion with family: Updated  (sister) via phone      Anticipated Length of Stay: Patient will be admitted on an inpatient basis with an anticipated length of stay of greater than 2 midnights secondary to GI bleed  Total Time for Visit, including Counseling / Coordination of Care: 45 minutes Greater than 50% of this total time spent on direct patient counseling and coordination of care  Chief Complaint:  Coffee-ground emesis    History of Present Illness:  Rachel Jamison is a 80 y o  male with a PMH of hemiplegia post-stroke, GERD, AAA, HLD,HTN who presents with one-week history of coffee-ground emesis  Patient states that he started experiencing abdominal pain and just started vomiting dark red blood  Last episode was on his way to the hospital via EMS transfer from [place]  Patient endorses epigastric pain at this time  Also endorsing some nausea  Denies any blood in his stools  Endorses good bowel movement and no constipation  Patient denies any alcohol use however when discussed with his sister, it was brought up and that he would drink alcohol if the people living in his house bring it in  Patient is also denying any drug use however sister endorses that she thinks the people for boarding in his house bring in drugs  As per patient's sister, patient's aide had come in 3 days ago and noted that the patient had bruising over his left eye as well as with seemed to be a dislocated right shoulder  It seemed the patient had fallen and had been unable to get up on his own  Review of Systems:  Review of Systems   Constitutional: Negative for chills and fever  HENT: Negative for ear pain and sore throat  Eyes: Negative for pain and visual disturbance  Respiratory: Negative for cough and shortness of breath  Cardiovascular: Negative for chest pain and palpitations  Gastrointestinal: Positive for abdominal pain and vomiting  Bloody vomitus   Genitourinary: Negative for dysuria and hematuria  Musculoskeletal: Negative for arthralgias and back pain     Skin: Negative for color change and rash    Neurological: Negative for seizures and syncope  All other systems reviewed and are negative  Past Medical and Surgical History:   Past Medical History:   Diagnosis Date    Hypertension        Past Surgical History:   Procedure Laterality Date    HAND SURGERY         Meds/Allergies:  Prior to Admission medications    Medication Sig Start Date End Date Taking? Authorizing Provider   amLODIPine (NORVASC) 2 5 mg tablet Take 5 mg by mouth daily    Historical Provider, MD   aspirin (ECOTRIN LOW STRENGTH) 81 mg EC tablet Take 81 mg by mouth daily    Historical Provider, MD   atorvastatin (LIPITOR) 40 mg tablet Take 40 mg by mouth daily    Historical Provider, MD   baclofen 10 mg tablet Take 10 mg by mouth 2 (two) times a day    Historical Provider, MD   famotidine (PEPCID) 20 mg tablet Take 20 mg by mouth 2 (two) times a day    Historical Provider, MD   gabapentin (NEURONTIN) 600 MG tablet Take 1 tablet (600 mg total) by mouth 3 (three) times a day 6/11/20 7/11/20  Claudell Laos, CRNP   HYDROcodone-acetaminophen [facility]) 5-325 mg per tablet Take 1 tablet by mouth 2 (two) times a day as needed for pain 1 pill twice daily if needed for pain  Do not fill until 7/9/2020Max Daily Amount: 2 tablets 6/11/20 7/11/20  Claudell Laos, CRNP   methylPREDNISolone 4 MG tablet therapy pack Use as directed on package 1/24/20   Gonzalo Graham MD   sertraline (ZOLOFT) 50 mg tablet Take 50 mg by mouth daily    Historical Provider, MD     I have reviewed home medications using recent Epic encounter  Allergies:    Allergies   Allergen Reactions    Vicodin Hp [Hydrocodone-Acetaminophen] Abdominal Pain       Social History:  Marital Status: Single   Occupation:  Retired  Patient Pre-hospital Living Situation: Home  Patient Pre-hospital Level of Mobility: non-ambulatory/bed bound  Patient Pre-hospital Diet Restrictions:  None  Substance Use History:   Social History     Substance and Sexual Activity   Alcohol Use Yes    Comment: occassional     Social History     Tobacco Use   Smoking Status Current Every Day Smoker    Packs/day: 0 50   Smokeless Tobacco Never Used     Social History     Substance and Sexual Activity   Drug Use Never       Family History:  Family History   Problem Relation Age of Onset    No Known Problems Mother     Hypertension Father        Physical Exam:     Vitals:   Blood Pressure: 143/75 (06/27/22 1243)  Pulse: (!) 53 (06/27/22 1243)  Temperature: 99 1 °F (37 3 °C) (06/27/22 1243)  Temp Source: Oral (06/27/22 1243)  Respirations: 18 (06/27/22 1243)  Height: 5' 10" (177 8 cm) (06/27/22 0915)  Weight - Scale: 60 5 kg (133 lb 6 1 oz) (06/27/22 0915)  SpO2: 97 % (06/27/22 1243)    Physical Exam  Vitals and nursing note reviewed  Constitutional:       Appearance: He is well-developed  Comments: Disheveled, drowsy but conversant   HENT:      Head: Normocephalic and atraumatic  Comments: Contusion/bruising over left orbit  Eyes:      Conjunctiva/sclera: Conjunctivae normal    Cardiovascular:      Rate and Rhythm: Normal rate and regular rhythm  Heart sounds: No murmur heard  Pulmonary:      Effort: Pulmonary effort is normal  No respiratory distress  Breath sounds: Normal breath sounds  Abdominal:      Palpations: Abdomen is soft  Tenderness: There is abdominal tenderness  There is guarding  Comments: Tender over epigastric region with guarding  No tenderness appreciated over other quadrants   Musculoskeletal:      Cervical back: Neck supple  Skin:     General: Skin is warm and dry  Neurological:      Mental Status: He is alert and oriented to person, place, and time        Comments: Drowsy but answering questions appropriately  Not opening eyes              Additional Data:     Lab Results:  Results from last 7 days   Lab Units 06/27/22  0706   WBC Thousand/uL 8 56   HEMOGLOBIN g/dL 9 8*   HEMATOCRIT % 30 4*   PLATELETS Thousands/uL 216   NEUTROS PCT % 69   LYMPHS PCT % 22   MONOS PCT % 9   EOS PCT % 0     Results from last 7 days   Lab Units 06/27/22  0230   SODIUM mmol/L 136   POTASSIUM mmol/L 3 5   CHLORIDE mmol/L 97   CO2 mmol/L 28   BUN mg/dL 23   CREATININE mg/dL 0 75   ANION GAP mmol/L 11   CALCIUM mg/dL 9 2   ALBUMIN g/dL 4 0   TOTAL BILIRUBIN mg/dL 0 73   ALK PHOS U/L 75   ALT U/L 11   AST U/L 21   GLUCOSE RANDOM mg/dL 114     Results from last 7 days   Lab Units 06/27/22  1302   INR  1 06             Results from last 7 days   Lab Units 06/27/22  0230   LACTIC ACID mmol/L 1 1       Imaging:  Ordered CT head, x-ray of by a bilateral shoulders, x-ray of facial bones  CT spine cervical wo contrast   Final Result by Gene Dennard Gitelman, DO (06/27 1411)      Cervical degenerative change with moderate canal stenosis and foraminal narrowing  No acute osseous abnormality  Workstation performed: [id number]         CT head wo contrast   Final Result by Gene Dennard Gitelman, DO (06/27 1410)      Acute subdural hematoma within the left hemisphere superficial to the frontal and parietal lobes with only minimal mass effect upon the adjacent brain parenchyma  Small hemorrhagic contusions are seen within the posterior aspect of the right hemisphere, within an area of encephalomalacia related to previous old infarct  No resulting mass effect  Additional chronic microangiopathic change within the brain parenchyma  I personally discussed this study with VIKKI ALBARADO on 6/27/2022 at 2:10 PM                            Workstation performed: [id number]         CT facial bones wo contrast   Final Result by Gene Dennard Gitelman, DO (06/27 1412)      No acute osseous abnormality  Subtle left facial soft tissue contusion superficial to the frontal bone and orbit  There is  an acute subdural hematoma within the left hemisphere  See separate CT brain report                 Workstation performed: [id number]         XR shoulder 2+ vw left    (Results Pending)   XR shoulder 2+ vw right    (Results Pending)       EKG and Other Studies Reviewed on Admission:   · EKG: Pending  ** Please Note: This note has been constructed using a voice recognition system   **

## 2022-06-27 NOTE — ASSESSMENT & PLAN NOTE
Patient reports history of coffee-ground emesis that has been going on for about a week, last episode in EMS on his way to [address] [place] [address]  Keep patient NPO, hold Eliquis  Check vitamin B12 and folate levels  IV fluid hydration  PPI q 12 hours  H&H q 8 hours, transfuse if hemoglobin below 7  GI consulted  Plan for EGD

## 2022-06-27 NOTE — CASE MANAGEMENT
Case Management Discharge Planning Note    Patient name Danilo Quinones  Location /-79 MRN [id number]  : 1950 Date 2022       Current Admission Date: 2022  Current Admission Diagnosis:GIB (gastrointestinal bleeding)   Patient Active Problem List    Diagnosis Date Noted    GIB (gastrointestinal bleeding) 2022    Hemiplegia, post-stroke ([facility] [address] ) 2022    Essential hypertension 2022    GERD (gastroesophageal reflux disease) 2022    Major depressive disorder 2022    Tobacco use 2022    Chronic bilateral low back pain without sciatica 2020    Cervical spinal stenosis 2019    Cervical spondylosis 2019    Cervical radiculopathy 2019    Neck pain 2019    Cervical disc disorder with radiculopathy of mid-cervical region 2019    Long-term current use of opiate analgesic     Uncomplicated opioid dependence ([facility] [address] ) 2019    Chronic pain syndrome 2019    Lumbar spondylosis 2019      LOS (days): 0  Geometric Mean LOS (GMLOS) (days):   Days to GMLOS:     OBJECTIVE:  Risk of Unplanned Readmission Score: 11 65         Current admission status: Inpatient   Preferred Pharmacy:   [facility] - [place], [place] - [address]  [address] [address]  Phone: [phone] Fax: [phone]    Primary Care Provider: Andre Ramirez DO    Primary Insurance:   Secondary Insurance:     DISCHARGE DETAILS:    Per MD patient will be transfer to higher level of care related to [facility] for trauma evaluation  Medical necessity form was done and provided to bedside nurse

## 2022-06-27 NOTE — NURSING NOTE
RN answered phone in patient's room and it was his sister, Andrew Bazan  Patient gave permission to speak with Andrew Bazan who asked if bleeding is from AAA  Andrew Bazan states patient will provide necessary information as which time RN informed Andrew Bazan of patient questioning planet earlier and concerns for orientation  Patient asked about AAA which he says was found last year  RN expressed concerns for safety in the home to which Andrew Bazan replied the patient has people living with him "who could have given him anything, but his brain is mush since his stroke "  Andrew Bazan provided  aide's phone number who gave RN agency number  RN spoke to Sal at [facility] who states the patient receives services Mon-Fri that include assistance with adl's, housekeeping, but not medications

## 2022-06-27 NOTE — DISCHARGE SUMMARY
[address]  Discharge- Bubba Daily 1950, 80 y o  male MRN: [id number]  Unit/Bed#: -01 Encounter: [id number]  Primary Care Provider: Jade Soto DO   Date and time admitted to hospital: 6/27/2022  9:19 AM    * GIB (gastrointestinal bleeding)  Assessment & Plan  Patient reports history of coffee-ground emesis that has been going on for about a week, last episode in EMS on his way to [address] [place] [address]  Keep patient NPO, hold Eliquis  Check vitamin B12 and folate levels  IV fluid hydration  PPI q 12 hours  H&H q 8 hours, transfuse if hemoglobin below 7  GI consulted  Plan for EGD    Subdural hematoma [facility])  Assessment & Plan  Patient noted to be drowsy on initial encounter seeming almost intoxicated  ETOH negative  U tox pending  Spoke with patient's sister who noted that the patient had fallen at home 3 days ago  CT scan of his head shows Acute subdural hematoma within the left hemisphere superficial to the frontal and parietal lobes with only minimal mass effect upon the adjacent brain parenchyma  Small hemorrhagic contusions are seen within the posterior aspect of the right hemisphere, within an area of encephalomalacia related to previous old infarct  No resulting mass effect    Discussed with neurosurgery and Trauma Service at [facility], patient will need to be transferred to [facility] for further evaluation and management  Angie Lao as patient had been on Eliquis    Tobacco use  Assessment & Plan  Advised smoking cessation  Nicotine patch ordered    Major depressive disorder  Assessment & Plan  Patient on sertraline at home    GERD (gastroesophageal reflux disease)  Assessment & Plan  Continue PPI q 12    Essential hypertension  Assessment & Plan  Hold prior to admission amlodipine 5 mg q day given NPO status  Continue to monitor blood pressure, acceptable and within normal limits at this time    Hemiplegia, post-stroke [facility])  Assessment & Plan  · Aspirin and atorvastatin are on hold given NPO status  · Appreciate PT and OT recommendations    Chronic pain syndrome  Assessment & Plan  Hold PTA gabapentin 600 mg TID given NPO status      Medical Problems             Resolved Problems  Date Reviewed: 6/11/2020   None               Discharging Physician / Practitioner: Arsenio De La Cruz MD  PCP: Ellen Sykes DO  Admission Date:   Admission Orders (From admission, onward)     Ordered        06/27/22 0936  Inpatient Admission  Once                      Discharge Date: 06/27/22    Consultations During Hospital Stay:  · GI  · Neurosurgery  · Trauma Service    Procedures Performed:   · None    Significant Findings / Test Results:   CT spine cervical wo contrast   Final Result by Corby Mckeon DO (06/27 1411)      Cervical degenerative change with moderate canal stenosis and foraminal narrowing  No acute osseous abnormality  Workstation performed: [id number]         CT head wo contrast   Final Result by Corby Mckeon DO (06/27 1410)      Acute subdural hematoma within the left hemisphere superficial to the frontal and parietal lobes with only minimal mass effect upon the adjacent brain parenchyma  Small hemorrhagic contusions are seen within the posterior aspect of the right hemisphere, within an area of encephalomalacia related to previous old infarct  No resulting mass effect  Additional chronic microangiopathic change within the brain parenchyma  I personally discussed this study with VIKKI ALBARADO on 6/27/2022 at 2:10 PM                            Workstation performed: [id number]         CT facial bones wo contrast   Final Result by Corby Mckeon DO (06/27 1412)      No acute osseous abnormality  Subtle left facial soft tissue contusion superficial to the frontal bone and orbit        There is  an acute subdural hematoma within the left hemisphere  See separate CT brain report  Workstation performed: [id number]         XR shoulder 2+ vw left    (Results Pending)   XR shoulder 2+ vw right    (Results Pending)   ·   ·     Incidental Findings:   · Acute subdural hematoma    Test Results Pending at Discharge (will require follow up):   · Xray shoulder     Outpatient Tests Requested:  · none    Complications:  none    Reason for Admission: GI bleed    Hospital Course:   Gisella Trevino is a 80 y o  male patient who originally presented to the hospital on 6/27/2022 due to coffee-ground emesis  Patient had initially presented to [facility] where initial evaluation was done  Labs showed hemoglobin to be stable and patient was sent over to [address] [place] [address] for further evaluation by GI  Patient had noted that his bleeding was going on for about a week  On admission, patient was started on Protonix b i d , patient was made NPO and GI was consulted for possible EGD  On further evaluation at [address] [place] [address], patient was noted to be drowsy and weak  It was endorsed that the patient had a fall 3 days prior to admission  He was noted to have a hematoma over his left orbit  CT scan showed acute subdural hematoma  No other facial fractures were noted  Discussed the case with Neurosurgery and [address] at [facility] and recommendation was to transfer patient to [facility] under trauma service for further evaluation  Gentry Duff was also ordered for the patient for reversal of Eliquis  The patient, initially admitted to the hospital as inpatient, was discharged earlier than expected given the following: Needs higher level of care  Please see above list of diagnoses and related plan for additional information  Condition at Discharge: guarded    Discharge Day Visit / Exam:   Subjective:  Patient seen and examined at bedside  Endorsing headache at this time  Denies any other new symptoms    Vitals: Blood Pressure: 160/75 (06/27/22 1500)  Pulse: 59 (06/27/22 1500)  Temperature: 99 1 °F (37 3 °C) (06/27/22 1243)  Temp Source: Oral (06/27/22 1243)  Respirations: 19 (06/27/22 1500)  Height: 5' 10" (177 8 cm) (06/27/22 0915)  Weight - Scale: 60 5 kg (133 lb 6 1 oz) (06/27/22 0915)  SpO2: 93 % (06/27/22 1500)  Exam:   Physical Exam  Vitals and nursing note reviewed  Constitutional:       Appearance: He is well-developed  HENT:      Head: Normocephalic  Contusion present  Eyes:      Conjunctiva/sclera: Conjunctivae normal    Cardiovascular:      Rate and Rhythm: Normal rate and regular rhythm  Heart sounds: No murmur heard  Pulmonary:      Effort: Pulmonary effort is normal  No respiratory distress  Breath sounds: Normal breath sounds  Abdominal:      Palpations: Abdomen is soft  Tenderness: There is abdominal tenderness  There is guarding  Musculoskeletal:      Cervical back: Neck supple  Skin:     General: Skin is warm and dry  Neurological:      Mental Status: He is alert and oriented to person, place, and time  Motor: Weakness present  Discussion with Family: Updated  (sister) via phone  Discharge instructions/Information to patient and family:   See after visit summary for information provided to patient and family  Provisions for Follow-Up Care:  See after visit summary for information related to follow-up care and any pertinent home health orders  Disposition:   [address] Transfer to [facility]    Planned Readmission:  None     Discharge Statement:  I spent 45 minutes discharging the patient  This time was spent on the day of discharge  I had direct contact with the patient on the day of discharge  Greater than 50% of the total time was spent examining patient, answering all patient questions, arranging and discussing plan of care with patient as well as directly providing post-discharge instructions    Additional time then spent on discharge activities  Discharge Medications:  See after visit summary for reconciled discharge medications provided to patient and/or family        **Please Note: This note may have been constructed using a voice recognition system**

## 2022-06-27 NOTE — TRANSPORTATION MEDICAL NECESSITY
Section I - General Information    Name of Patient: An Meyers                 : 1950    Medicare #:   Transport Date: 22 (PCS is valid for round trips on this date and for all repetitive trips in the 60-day range as noted below )  Origin: [address] [place] [address] [place]: [place]  Is the pt's stay covered under Medicare Part A (PPS/DRG)   []     Closest appropriate facility? If no, why is transport to more distant facility required? Yes  If hospice pt, is this transport related to pt's terminal illness? No       Section II - Medical Necessity Questionnaire  Ambulance transportation is medically necessary only if other means of transport are contraindicated or would be potentially harmful to the patient  To meet this requirement, the patient must either be "bed confined" or suffer from a condition such that transport by means other than ambulance is contraindicated by the patient's condition  The following questions must be answered by the medical professional signing below for this form to be valid:    Subdural hematoma    1)  Describe the MEDICAL CONDITION (physical and/or mental) of this patient AT [address] [place] [address] that requires the patient to be transported in an ambulance and why transport by other means is contraindicated by the patient's condition:    2) Is the patient "bed confined" as defined below? No  To be "be confined" the patient must satisfy all three of the following conditions: (1) unable to get up from bed without Assistance; AND (2) unable to ambulate; AND (3) unable to sit in a chair or wheelchair  3) Can this patient safely be transported by car or wheelchair van (i e , seated during transport without a medical attendant or monitoring)?    No    4) In addition to completing questions 1-3 above, please check any of the following conditions that apply*:   *Note: supporting documentation for any boxes checked must be maintained in the patient's medical records  Medical attendant required   Hemodynamic monitoring required en route      If hosp-hosp transfer, describe services needed at 2nd facility not available at 1st facility? Trauma and Neurosurgeon  acute subdural hematoma within the left hemisphere,  There is only minimal mass effect upon the adjacent brain parenchyma with no subfalcine or transtentorial herniation  Small acute hemorrhagic contusions         Section III - Signature of Physician or Healthcare Professional  I certify that the above information is true and correct based on my evaluation of this patient, and represent that the patient requires transport by ambulance and that other forms of transport are contraindicated  I understand that this information will be used by the Centers for Medicare and Medicaid Services (CMS) to support the determination of medical necessity for ambulance services, and I represent that I have personal knowledge of the patient's condition at time of transport  []  If this box is checked, I also certify that the patient is physically or mentally incapable of signing the ambulance service's claim and that the institution with which I am affiliated has furnished care, services, or assistance to the patient  My signature below is made on behalf of the patient pursuant to 42 CFR §424 36(b)(4)   In accordance with 42 CFR §424 37, the specific reason(s) that the patient is physically or mentally incapable of signing the claim form is as follows:     Signature of Physician* or Healthcare Professional______________________________________________________________  Signature Date 06/27/22 (For scheduled repetitive transports, this form is not valid for transports performed more than 60 days after this date)    Printed Name & Credentials of Physician or Healthcare Professional (MD, DO, RN, etc )___Stephanie Puga RN _____________________________  *Form must be signed by patient's attending physician for scheduled, repetitive transports   For non-repetitive, unscheduled ambulance transports, if unable to obtain the signature of the attending physician, any of the following may sign (choose appropriate option below)  [] Physician Assistant []  Clinical Nurse Specialist [x]  Registered Nurse  []  Nurse Practitioner  [] Discharge Planner

## 2022-06-27 NOTE — CONSULTS
Consultation - [facility] at [address] 80 y o  male MRN: [id number]  Unit/Bed#: -01 Encounter: [id number]        Inpatient consult to gastroenterology  Consult performed by: Elizabeth Landry MD  Consult ordered by: Gordo Salas MD          Reason for Consult / Principal Problem:     Hematemesis  GERD  Macrocytic anemia  On chronic anticoagulation            ASSESSMENT AND PLAN:      Hematemesis  GERD  Macrocytic anemia  On chronic anticoagulation  -patient endorsing week-long history of coffee-ground emesis without precipitating factors  -BUN mildly elevated  -hemoglobin 9 8 down from 10 6 suspect partially dilutional was 11 9 on 5/1/22; baseline hemoglobin appears to be between 12-13  -iron panel pending; given macrocytosis recommend checking B12 and folate levels as well  -patient remains hemodynamically stable  -last dose of Eliquis was on 06/26; continue to hold Eliquis  -continue with IV Protonix 40 mg b i d   -keep NPO  -continue to monitor hemoglobin q 12 hours and transfuse for Hgb<7  -will plan on EGD-timing to be determined    Chronic diarrhea  -previous workup from 03/2022 unrevealing(including C diff, culture and enteric panel)  -stool record at bedside  -if there is objective evidence of diarrhea(at least 3 loose bowel movements per day) consider further workup  -given anemia check celiac serology  -patient would likely benefit from outpatient GI evaluation and colonoscopy-[facility] or [facility] both of which are closer to his home ([facility])    ADDENDUM:  Intracranial Hemorrhage  -discussed and reviewed images with radiologist which confirms acute L SDH with possible minimal shift  -in light of these findings will defer EGD at this time as I do not suspect active GI bleed  -consider reversal of Eliquis  -neurology/neurosurgery evaluation    ______________________________________________________________________    HPI:  Corey Ryan is a/an 80 y o  male seen in consultation for hematemesis  He has significant past medical history includes CVA, sick sinus syndrome, paroxysmal atrial fibrillation on Eliquis, hypertension, GERD, AAA, hyperlipidemia, chronic pain, hemiplegia, depression, anxiety and tobacco abuse  Patient unfortunately is a poor historian however endorses vomiting for 1 week with associated nausea without abdominal pain  He describes the vomit as being dark in color  Denies blood or melena in stool  He denies excessive alcohol use, excessive NSAID use and denies personal history of liver disease  Last time he vomited was earlier this morning when being transported from [facility]  He reports his last dose of Eliquis was yesterday  He apparently fell as well  Denies previous endoscopic evaluation  He also endorses chronic diarrhea since Christmas  No blood or abdominal pain  He underwent extensive testing 03/2022 and was negative for C diff, negative enteric panel  Again history is extremely limited  REVIEW OF SYSTEMS:    Review of Systems   Reason unable to perform ROS: Patient not cooperative  Constitutional: Negative for fever  HENT: Negative for trouble swallowing  Gastrointestinal: Positive for diarrhea, nausea and vomiting  Negative for abdominal pain and blood in stool  Musculoskeletal: Positive for gait problem  Skin: Negative for color change  Allergic/Immunologic: Negative for immunocompromised state  Historical Information   Past Medical History:   Diagnosis Date    Hypertension      History reviewed  No pertinent surgical history    Social History   Social History     Substance and Sexual Activity   Alcohol Use Yes    Comment: occassional     Social History     Substance and Sexual Activity   Drug Use Never     Social History     Tobacco Use   Smoking Status Current Every Day Smoker    Packs/day: 0 50   Smokeless Tobacco Never Used     Family History   Problem Relation Age of Onset    No Known Problems Mother     No Known Problems Father        Meds/Allergies     Medications Prior to Admission   Medication    amLODIPine (NORVASC) 2 5 mg tablet    aspirin (ECOTRIN LOW STRENGTH) 81 mg EC tablet    atorvastatin (LIPITOR) 40 mg tablet    baclofen 10 mg tablet    famotidine (PEPCID) 20 mg tablet    gabapentin (NEURONTIN) 600 MG tablet    HYDROcodone-acetaminophen (NORCO) 5-325 mg per tablet    methylPREDNISolone 4 MG tablet therapy pack    sertraline (ZOLOFT) 50 mg tablet     Current Facility-Administered Medications   Medication Dose Route Frequency    nicotine (NICODERM CQ) 7 mg/24hr TD 24 hr patch 1 patch  1 patch Transdermal Daily    ondansetron (ZOFRAN) injection 4 mg  4 mg Intravenous Q6H PRN    pantoprazole (PROTONIX) injection 40 mg  40 mg Intravenous Q12H    sodium chloride 0 9 % infusion  125 mL/hr Intravenous Continuous       Allergies   Allergen Reactions    Vicodin Hp [Hydrocodone-Acetaminophen] Abdominal Pain           Objective     Blood pressure 118/59, pulse 61, temperature 98 3 °F (36 8 °C), temperature source Oral, resp  rate 18, height 5' 10" (1 778 m), weight 60 5 kg (133 lb 6 1 oz), SpO2 95 %  Body mass index is 19 14 kg/m²  No intake or output data in the 24 hours ending 06/27/22 1116      PHYSICAL EXAM:      Physical Exam  Constitutional:       General: He is not in acute distress  Appearance: He is ill-appearing  HENT:      Head: Normocephalic  Mouth/Throat:      Comments: Dried blood on lips  Eyes:      General: No scleral icterus  Cardiovascular:      Rate and Rhythm: Normal rate  Pulmonary:      Effort: Pulmonary effort is normal    Abdominal:      General: There is no distension  Palpations: Abdomen is soft  Tenderness: There is no abdominal tenderness  There is no guarding     Musculoskeletal: General: No swelling  Cervical back: Neck supple  Skin:     Coloration: Skin is not jaundiced  Neurological:      Mental Status: He is alert  Psychiatric:      Comments: Agitated             Lab Results:   I have reviewed pertinent labs: Most Recent Labs:   Lab Results   Component Value Date    WBC 8 56 06/27/2022    RBC 3 12 (L) 06/27/2022    HGB 9 8 (L) 06/27/2022    HCT 30 4 (L) 06/27/2022     06/27/2022    RDW 14 4 06/27/2022    NEUTROABS 5 82 06/27/2022    SODIUM 136 06/27/2022    K 3 5 06/27/2022    CL 97 06/27/2022    CO2 28 06/27/2022    BUN 23 06/27/2022    CREATININE 0 75 06/27/2022    GLUC 114 06/27/2022    CALCIUM 9 2 06/27/2022    AST 21 06/27/2022    ALT 11 06/27/2022    ALKPHOS 75 06/27/2022    TP 6 5 06/27/2022    ALB 4 0 06/27/2022    TBILI 0 73 06/27/2022          Imaging Studies: I have personally reviewed pertinent imaging studies

## 2022-06-27 NOTE — EMTALA/ACUTE CARE TRANSFER
[address] [place] [id number]  Dept: [phone]      EMTALA TRANSFER CONSENT    NAME Nell Matthews                                         1950                              MRN [id number]    I have been informed of my rights regarding examination, treatment, and transfer   by Dr Mone Laws MD    Benefits: Other benefits (Include comment)_______________________    Risks: Potential for delay in receiving treatment, Potential deterioration of medical condition, Loss of IV, Increased discomfort during transfer, Possible worsening of condition or death during transfer      Consent for Transfer:  I acknowledge that my medical condition has been evaluated and explained to me by the emergency department physician or other qualified medical person and/or my attending physician, who has recommended that I be transferred to the service of  Accepting Physician: Dr Nito Richter at [address] [place] [address] Name, [address] : [facility]  AT [place]  The above potential benefits of such transfer, the potential risks associated with such transfer, and the probable risks of not being transferred have been explained to me, and I fully understand them  The doctor has explained that, in my case, the benefits of transfer outweigh the risks  I agree to be transferred  I authorize the performance of emergency medical procedures and treatments upon me in both transit and upon arrival at the receiving facility  Additionally, I authorize the release of any and all medical records to the receiving facility and request they be transported with me, if possible  I understand that the safest mode of transportation during a medical emergency is an ambulance and that the Hospital advocates the use of this mode of transport   Risks of traveling to the receiving facility by car, including absence of medical control, life sustaining equipment, such as oxygen, and medical personnel has been explained to me and I fully understand them  (SARAH CORRECT BOX BELOW)  [  ]  I consent to the stated transfer and to be transported by ambulance/helicopter  [  ]  I consent to the stated transfer, but refuse transportation by ambulance and accept full responsibility for my transportation by car  I understand the risks of non-ambulance transfers and I exonerate the Hospital and its staff from any deterioration in my condition that results from this refusal     X___________________________________________    DATE  22  TIME________  Signature of patient or legally responsible individual signing on patient behalf           RELATIONSHIP TO PATIENT_________________________          Provider Certification    NAME Shad Gonzalez                                         1950                              MRN [id number]    A medical screening exam was performed on the above named patient  Based on the examination:    Condition Necessitating Transfer The encounter diagnosis was Upper GI bleed  Patient Condition: The patient has been stabilized such that within reasonable medical probability, no material deterioration of the patient condition or the condition of the unborn child(tere) is likely to result from the transfer    Reason for Transfer: Other (Include comment)____________________    Transfer Requirements: Facility [facility]   · Space available and qualified personnel available for treatment as acknowledged by    · Agreed to accept transfer and to provide appropriate medical treatment as acknowledged by       Dr Rocky Mehta  · Appropriate medical records of the examination and treatment of the patient are provided at the time of transfer   [address] _______  · Transfer will be performed by qualified personnel from    and appropriate transfer equipment as required, including the use of necessary and appropriate life support measures      Provider Certification: I have examined the patient and explained the following risks and benefits of being transferred/refusing transfer to the patient/family:  General risk, such as traffic hazards, adverse weather conditions, rough terrain or turbulence, possible failure of equipment (including vehicle or aircraft), or consequences of actions of persons outside the control of the transport personnel, Risk of worsening condition, The possibility of a transport vehicle being unavailable, Unanticipated needs of medical equipment and personnel during transport      Based on these reasonable risks and benefits to the patient and/or the unborn child(tere), and based upon the information available at the time of the patients examination, I certify that the medical benefits reasonably to be expected from the provision of appropriate medical treatments at another medical facility outweigh the increasing risks, if any, to the individuals medical condition, and in the case of labor to the unborn child, from effecting the transfer      X____________________________________________ DATE 06/27/22        TIME_______      ORIGINAL - SEND TO MEDICAL RECORDS   COPY - SEND WITH PATIENT DURING TRANSFER

## 2022-06-27 NOTE — ASSESSMENT & PLAN NOTE
Hold prior to admission amlodipine 5 mg q day given NPO status  Continue to monitor blood pressure, acceptable and within normal limits at this time

## 2022-06-27 NOTE — NURSING NOTE
After arriving in [address] [place] [address] for endoscopy procedure order received to draw pt/inr, ptt  Received call from floor RN to send patient to CT scan as order just put in  Received notification from CT of cerebral bleed  Patient returned to ICU, EGD not performed

## 2022-06-27 NOTE — H&P
H&P - Trauma   Bibi Ventura 80 y o  male MRN: [id number]  Unit/Bed#: [facility] 603-01 Encounter: [id number]    Trauma Alert: Evaluation; trauma team notified at 6:05 pm via text   Model of Arrival: Ambulance    Trauma Team: Attending Mac Phalen and Residents Trudy Goldstein  Consultants:     Neurosurgery: routine consult; Epic consult order placed; Other (GI): {routine consult; Epic consult order placed; Assessment/Plan   Active Problems / Assessment:   Hematemesis 2/2 suspected upper GI bleed  Acute SDH, hemorrhagic contusions   Facial soft tissue contusion      Plan:   Trauma admission - SD2  HOT protocol  Stat labs, including TEG (s/p [facility] for history of Eliquis)  Neurosurgery consult  Holding all Anticoagulation   Trend H&H  GI consult   NPO  [email or web address]  Protonix 40 mg BID  Prn analgesia   PT/OT  Case management     History of Present Illness     Chief Complaint: headache   Mechanism:Fall     HPI:    Bibi Ventura is a 80 y o  male with PMHx of sick sinus syndrome, CVA, pAfib on Eliquis, HTN, GERD, AAA, HLD, chronic pain, hemiplegia, depression anxiety, who presented to [facility] ED this morning complaining of one week of coffee ground emesis  Patient was then transferred to [facility] for GI evaluation  Upon arrival, he was noted to be confused  Upon further prompting, he endorsed a fall three days ago where he hit his head  No LOC  He did not present to the ED right away because he only had a headache  His sister encouraged his arrival to the [facility] ED early this morning  Workup initiated at [facility] after hearing of the fall and confusion revealed a SDH  He was given [facility] for history of Eliquis  Patient was transferred to [facility] for trauma and neurosurgery evaluation and management  Upon arrival to [facility], he complains of a headache  States he started vomiting blood yesterday  Endorses a fall where he lost his balance 3 days ago, but denies LOC  Review of Systems   Constitutional: Negative for chills and fever     HENT: Negative  Eyes: Negative for visual disturbance  Respiratory: Negative for chest tightness and shortness of breath  Cardiovascular: Negative for chest pain and leg swelling  Gastrointestinal: Positive for vomiting  Negative for abdominal distention, abdominal pain, diarrhea and nausea  Endocrine: Negative  Genitourinary: Negative for difficulty urinating  Musculoskeletal: Negative for back pain and neck pain  Skin: Negative for wound  Allergic/Immunologic: Negative  Neurological: Positive for headaches  Negative for weakness  Hematological: Negative  Psychiatric/Behavioral: Negative  12-point, complete review of systems was reviewed and negative except as stated above  Historical Information     Past Medical History:   Diagnosis Date    Hypertension      Past Surgical History:   Procedure Laterality Date    HAND SURGERY          Social History     Tobacco Use    Smoking status: Current Every Day Smoker     Packs/day: 0 50    Smokeless tobacco: Never Used   Vaping Use    Vaping Use: Never used   Substance Use Topics    Alcohol use: Yes     Comment: occassional    Drug use: Never       There is no immunization history on file for this patient  Last Tetanus: unknown   Family History: Non-contributory    1  Before the illness or injury that brought you to the Emergency, did you need someone to help you on a regular basis? 0=No   2  Since the illness or injury that brought you to the Emergency, have you needed more help than usual to take care of yourself? 1=Yes   3  Have you been hospitalized for one or more nights during the past 6 months (excluding a stay in the Emergency Department)? 0=No   4  In general, do you see well? 0=Yes   5  In general, do you have serious problems with your memory? 0=No   6  Do you take more than three different medications everyday?  1=Yes   TOTAL   2     Did you order a geriatric consult if the score was 2 or greater?: yes     Meds/Allergies current meds:   Current Facility-Administered Medications   Medication Dose Route Frequency    acetaminophen (TYLENOL) tablet 975 mg  975 mg Oral Q6H PRN    [START ON 6/28/2022] atorvastatin (LIPITOR) tablet 40 mg  40 mg Oral Daily    [START ON 6/28/2022] DULoxetine (CYMBALTA) delayed release capsule 60 mg  60 mg Oral Daily    [START ON 6/28/2022] gabapentin (NEURONTIN) capsule 300 mg  300 mg Oral Daily    lactated ringers infusion  125 mL/hr Intravenous Continuous    [START ON 6/28/2022] metoprolol succinate (TOPROL-XL) 24 hr tablet 100 mg  100 mg Oral Daily    [START ON 6/28/2022] nicotine (NICODERM CQ) 14 mg/24hr TD 24 hr patch 1 patch  1 patch Transdermal Daily    ondansetron (ZOFRAN) injection 4 mg  4 mg Intravenous Q6H PRN    pantoprazole (PROTONIX) injection 40 mg  40 mg Intravenous Q12H     Allergies have not been reviewed; Allergies   Allergen Reactions    Lisinopril Swelling     face swelling      Vicodin Hp [Hydrocodone-Acetaminophen] Abdominal Pain    Oxycodone Rash     No prescribed substances to be prescribed given failure of urine tox screen         Objective   Initial Vitals:   Temperature: 99 1 °F (37 3 °C) (06/27/22 1810)  Pulse: 61 (06/27/22 1810)  Respirations: 18 (06/27/22 1810)  Blood Pressure: 133/77 (06/27/22 1810)    Primary Survey:   Airway:        Status: patent;        Pre-hospital Interventions: none        Hospital Interventions: none  Breathing:        Pre-hospital Interventions: none       Effort: normal       Right breath sounds: normal       Left breath sounds: normal  Circulation:        Rhythm: irregular       Rate: regular   Right Pulses Left Pulses    R radial: 2+  R femoral: 2+       L radial: 2+  L femoral: 2+         Disability: Interventions: Diminished strength in LUE + LLE (4/5) is chronic from prior stroke         GCS: Eye: 4; Verbal: 5 Motor: 6 Total: 15       Right Pupil: 3 mm;  round;  reactive         Left Pupil:  3 mm;  round;  reactive      R Motor Strength L Motor Strength    R : 5/5  R dorsiflex: 5/5  R plantarflex: 5/5 L : 4/5  L dorsiflex: 4/5  L plantarflex: 4/5        Sensory:  No sensory deficit  Exposure:       Completed: Yes      Secondary Survey:  Physical Exam  Vitals and nursing note reviewed  Constitutional:       Appearance: Normal appearance  HENT:      Head: Normocephalic  Comments: Ecchymosis above L eyebrow  Right Ear: External ear normal       Left Ear: External ear normal       Nose: Nose normal       Mouth/Throat:      Mouth: Mucous membranes are moist    Eyes:      Extraocular Movements: Extraocular movements intact  Conjunctiva/sclera: Conjunctivae normal       Pupils: Pupils are equal, round, and reactive to light  Cardiovascular:      Rate and Rhythm: Normal rate  Pulses: Normal pulses  Heart sounds: No murmur heard  Pulmonary:      Effort: Pulmonary effort is normal  No respiratory distress  Breath sounds: Normal breath sounds  Abdominal:      General: There is no distension  Palpations: Abdomen is soft  Tenderness: There is no abdominal tenderness  There is no guarding or rebound  Genitourinary:     Comments: Condom catheter in place  Musculoskeletal:         General: No swelling, tenderness, deformity or signs of injury  Cervical back: Normal range of motion and neck supple  No tenderness  Right lower leg: No edema  Left lower leg: No edema  Comments: No C/T/L spine tenderness   Skin:     General: Skin is warm and dry  Capillary Refill: Capillary refill takes less than 2 seconds  Neurological:      Mental Status: He is alert and oriented to person, place, and time  GCS: GCS eye subscore is 4  GCS verbal subscore is 5  GCS motor subscore is 6  Cranial Nerves: Cranial nerves are intact  Comments: Diminished strength in LUE + LLE (4/5) is chronic from prior stroke         GCS: Eye: 4; Verbal: 5 Motor: 6 Total: 15       Right Pupil: 3 mm;  round;  reactive         Left Pupil:  3 mm;  round;  reactive      R Motor Strength L Motor Strength   R : 5/5  R dorsiflex: 5/5  R plantarflex: 5/5 L : 4/5  L dorsiflex: 4/5  L plantarflex: 4/5     Psychiatric:         Mood and Affect: Mood normal          Behavior: Behavior normal          Invasive Devices  Report    Peripheral Intravenous Line  Duration           Peripheral IV 06/27/22 Distal;Dorsal (posterior); Left Forearm <1 day    Peripheral IV 06/27/22 Dorsal (posterior); Right Hand <1 day          Drain  Duration           External Urinary Catheter Medium <1 day              Lab Results:   BMP/CMP:   Lab Results   Component Value Date    SODIUM 136 06/27/2022    K 3 5 06/27/2022    CL 97 06/27/2022    CO2 28 06/27/2022    BUN 23 06/27/2022    CREATININE 0 75 06/27/2022    CALCIUM 9 2 06/27/2022    AST 21 06/27/2022    ALT 11 06/27/2022    ALKPHOS 75 06/27/2022    EGFR 92 06/27/2022   , CBC:   Lab Results   Component Value Date    WBC 8 56 06/27/2022    HGB 9 2 (L) 06/27/2022    HCT 30 4 (L) 06/27/2022    MCV 97 06/27/2022     06/27/2022    MCH 31 4 06/27/2022    MCHC 32 2 06/27/2022    RDW 14 4 06/27/2022    MPV 9 9 06/27/2022    NRBC 0 06/27/2022    and Coagulation:   Lab Results   Component Value Date    INR 1 06 06/27/2022       Imaging Results: I have personally reviewed pertinent reports  Chest Xray(s): N/A   FAST exam(s): N/A   CT Scan(s): 6/27 CTH: Acute SDH w/in the L hemisphere, superficial to the frontal and parietal lobes w only minimal mass effect upon the adjacent brain parenchyma  Small hemorrhagic contusions are seen w/in the posterior aspect of the R hemisphere, w/in an area of encephalomalacia related to previous old infarct  No resulting mass effect  6/27 CT facial: Subtle L facial soft tissue contusion superficial to the frontal bone and orbit  Additional Xray(s): 6/27 R shoulder XR: There is no acute fracture or dislocation    Minimal, chronic elevation of the right clavicle in relation to the acromion  Patient had grade 3 right AC joint separation on remote imaging  6/27 L shoulder XR: There is no acute fracture or dislocation  Mild osteoarthritis acromioclavicular joint  Other Studies: N/A    Code Status: Level 1 - Full Code  Advance Directive and Living Will:      Power of :    POLST:    I have spent 30 minutes with Patient  today in which greater than 50% of this time was spent in counseling/coordination of care regarding Diagnostic results, Prognosis, Risks and benefits of tx options, Intructions for management, Patient and family education, Importance of tx compliance, Risk factor reductions and Impressions

## 2022-06-27 NOTE — TELEMEDICINE
On-Call Telephone Note    Contacted by Marlow Alpers 80 y o  male MRN: [id number]  Unit/Bed#: -01 Encounter: [id number]    Per provider report, patient presents with AMS s/p fall 3 days ago in the setting of a GI bleed  H/o significant for right posterior infarct with resulting hemiplegia  On Eliquis, s/p [facility]  Patient being worked up for GI bleed but seemed off per primary team  A&O x3 but reportly seemed a poor historian  CT head obtained showing acute left sided SDH with IPH within right posterior stroke cavity  Available past medical history,social history, surgical history, medication list, drug allergies and review of systems were reviewed  /75   Pulse (!) 53   Temp 99 1 °F (37 3 °C) (Oral)   Resp 18   Ht 5' 10" (1 778 m)   Wt 60 5 kg (133 lb 6 1 oz)   SpO2 97%   BMI 19 14 kg/m²      Clinical exam per provider report, baseline left hemiplagia  Unkempt, dirty appearing  Intoxicated affect per ED provider note  Imaging personally reviewed  CT head w/o, 6/27/22: Acute subdural hematoma within the left hemisphere superficial to the frontal and parietal lobes with only minimal mass effect upon the adjacent brain parenchyma  Small hemorrhagic contusions are seen within the posterior aspect of the right hemisphere, within an area of encephalomalacia related to previous old infarct  No resulting mass effect  Assessment and Plan  1  D/w primary team, will defer decision to transfer to trauma given recent fall   2  Repeat CT head in am  3  Reverse coagulopathies  4  Monitor neuro exam  5  Neurosurgery will evaluate patient if/when transferred to B    All questions answered  Provider is in agreement with the course of action  A total of 15 minutes was spent discussing the presentation, physical exam and formulating a management plan with the provider

## 2022-06-28 ENCOUNTER — APPOINTMENT (INPATIENT)
Dept: RADIOLOGY | Facility: HOSPITAL | Age: 72
DRG: 086 | End: 2022-06-28
Payer: COMMERCIAL

## 2022-06-28 ENCOUNTER — TELEPHONE (OUTPATIENT)
Dept: NEUROSURGERY | Facility: CLINIC | Age: 72
End: 2022-06-28

## 2022-06-28 PROBLEM — D62 ACUTE BLOOD LOSS ANEMIA: Status: ACTIVE | Noted: 2022-06-28

## 2022-06-28 LAB
ANION GAP SERPL CALCULATED.3IONS-SCNC: 3 MMOL/L (ref 4–13)
ANION GAP SERPL CALCULATED.3IONS-SCNC: 5 MMOL/L (ref 4–13)
BASOPHILS # BLD AUTO: 0.03 THOUSANDS/ΜL (ref 0–0.1)
BASOPHILS NFR BLD AUTO: 1 % (ref 0–1)
BUN SERPL-MCNC: 13 MG/DL (ref 5–25)
BUN SERPL-MCNC: 15 MG/DL (ref 5–25)
CALCIUM SERPL-MCNC: 8.1 MG/DL (ref 8.3–10.1)
CALCIUM SERPL-MCNC: 8.2 MG/DL (ref 8.3–10.1)
CHLORIDE SERPL-SCNC: 105 MMOL/L (ref 100–108)
CHLORIDE SERPL-SCNC: 107 MMOL/L (ref 100–108)
CO2 SERPL-SCNC: 28 MMOL/L (ref 21–32)
CO2 SERPL-SCNC: 31 MMOL/L (ref 21–32)
CREAT SERPL-MCNC: 0.66 MG/DL (ref 0.6–1.3)
CREAT SERPL-MCNC: 0.66 MG/DL (ref 0.6–1.3)
EOSINOPHIL # BLD AUTO: 0.11 THOUSAND/ΜL (ref 0–0.61)
EOSINOPHIL NFR BLD AUTO: 2 % (ref 0–6)
ERYTHROCYTE [DISTWIDTH] IN BLOOD BY AUTOMATED COUNT: 14.5 % (ref 11.6–15.1)
ERYTHROCYTE [DISTWIDTH] IN BLOOD BY AUTOMATED COUNT: 14.6 % (ref 11.6–15.1)
FOLATE SERPL-MCNC: 8.8 NG/ML (ref 3.1–17.5)
GFR SERPL CREATININE-BSD FRML MDRD: 97 ML/MIN/1.73SQ M
GFR SERPL CREATININE-BSD FRML MDRD: 97 ML/MIN/1.73SQ M
GLUCOSE SERPL-MCNC: 82 MG/DL (ref 65–140)
GLUCOSE SERPL-MCNC: 82 MG/DL (ref 65–140)
HCT VFR BLD AUTO: 31.1 % (ref 36.5–49.3)
HCT VFR BLD AUTO: 31.5 % (ref 36.5–49.3)
HGB BLD-MCNC: 9.6 G/DL (ref 12–17)
HGB BLD-MCNC: 9.7 G/DL (ref 12–17)
HGB BLD-MCNC: 9.7 G/DL (ref 12–17)
IMM GRANULOCYTES # BLD AUTO: 0.01 THOUSAND/UL (ref 0–0.2)
IMM GRANULOCYTES NFR BLD AUTO: 0 % (ref 0–2)
LYMPHOCYTES # BLD AUTO: 2.17 THOUSANDS/ΜL (ref 0.6–4.47)
LYMPHOCYTES NFR BLD AUTO: 34 % (ref 14–44)
MAGNESIUM SERPL-MCNC: 2.1 MG/DL (ref 1.6–2.6)
MAGNESIUM SERPL-MCNC: 2.1 MG/DL (ref 1.6–2.6)
MCH RBC QN AUTO: 30.2 PG (ref 26.8–34.3)
MCH RBC QN AUTO: 31 PG (ref 26.8–34.3)
MCHC RBC AUTO-ENTMCNC: 30.8 G/DL (ref 31.4–37.4)
MCHC RBC AUTO-ENTMCNC: 31.2 G/DL (ref 31.4–37.4)
MCV RBC AUTO: 98 FL (ref 82–98)
MCV RBC AUTO: 99 FL (ref 82–98)
MONOCYTES # BLD AUTO: 0.55 THOUSAND/ΜL (ref 0.17–1.22)
MONOCYTES NFR BLD AUTO: 9 % (ref 4–12)
NEUTROPHILS # BLD AUTO: 3.54 THOUSANDS/ΜL (ref 1.85–7.62)
NEUTS SEG NFR BLD AUTO: 54 % (ref 43–75)
NRBC BLD AUTO-RTO: 0 /100 WBCS
PHOSPHATE SERPL-MCNC: 2.2 MG/DL (ref 2.3–4.1)
PHOSPHATE SERPL-MCNC: 2.3 MG/DL (ref 2.3–4.1)
PLATELET # BLD AUTO: 170 THOUSANDS/UL (ref 149–390)
PLATELET # BLD AUTO: 189 THOUSANDS/UL (ref 149–390)
PMV BLD AUTO: 10 FL (ref 8.9–12.7)
PMV BLD AUTO: 10.3 FL (ref 8.9–12.7)
POTASSIUM SERPL-SCNC: 3.3 MMOL/L (ref 3.5–5.3)
POTASSIUM SERPL-SCNC: 3.7 MMOL/L (ref 3.5–5.3)
RBC # BLD AUTO: 3.13 MILLION/UL (ref 3.88–5.62)
RBC # BLD AUTO: 3.21 MILLION/UL (ref 3.88–5.62)
SODIUM SERPL-SCNC: 138 MMOL/L (ref 136–145)
SODIUM SERPL-SCNC: 141 MMOL/L (ref 136–145)
VIT B12 SERPL-MCNC: 229 PG/ML (ref 100–900)
WBC # BLD AUTO: 6.41 THOUSAND/UL (ref 4.31–10.16)
WBC # BLD AUTO: 7.63 THOUSAND/UL (ref 4.31–10.16)

## 2022-06-28 PROCEDURE — 83735 ASSAY OF MAGNESIUM: CPT | Performed by: STUDENT IN AN ORGANIZED HEALTH CARE EDUCATION/TRAINING PROGRAM

## 2022-06-28 PROCEDURE — 99222 1ST HOSP IP/OBS MODERATE 55: CPT | Performed by: PHYSICIAN ASSISTANT

## 2022-06-28 PROCEDURE — 82746 ASSAY OF FOLIC ACID SERUM: CPT | Performed by: STUDENT IN AN ORGANIZED HEALTH CARE EDUCATION/TRAINING PROGRAM

## 2022-06-28 PROCEDURE — 99223 1ST HOSP IP/OBS HIGH 75: CPT | Performed by: INTERNAL MEDICINE

## 2022-06-28 PROCEDURE — 82607 VITAMIN B-12: CPT | Performed by: STUDENT IN AN ORGANIZED HEALTH CARE EDUCATION/TRAINING PROGRAM

## 2022-06-28 PROCEDURE — 84100 ASSAY OF PHOSPHORUS: CPT | Performed by: STUDENT IN AN ORGANIZED HEALTH CARE EDUCATION/TRAINING PROGRAM

## 2022-06-28 PROCEDURE — 70450 CT HEAD/BRAIN W/O DYE: CPT

## 2022-06-28 PROCEDURE — 85025 COMPLETE CBC W/AUTO DIFF WBC: CPT | Performed by: STUDENT IN AN ORGANIZED HEALTH CARE EDUCATION/TRAINING PROGRAM

## 2022-06-28 PROCEDURE — G1004 CDSM NDSC: HCPCS

## 2022-06-28 PROCEDURE — 86364 TISS TRNSGLTMNASE EA IG CLAS: CPT | Performed by: STUDENT IN AN ORGANIZED HEALTH CARE EDUCATION/TRAINING PROGRAM

## 2022-06-28 PROCEDURE — C9113 INJ PANTOPRAZOLE SODIUM, VIA: HCPCS | Performed by: STUDENT IN AN ORGANIZED HEALTH CARE EDUCATION/TRAINING PROGRAM

## 2022-06-28 PROCEDURE — 85027 COMPLETE CBC AUTOMATED: CPT | Performed by: STUDENT IN AN ORGANIZED HEALTH CARE EDUCATION/TRAINING PROGRAM

## 2022-06-28 PROCEDURE — 99233 SBSQ HOSP IP/OBS HIGH 50: CPT | Performed by: STUDENT IN AN ORGANIZED HEALTH CARE EDUCATION/TRAINING PROGRAM

## 2022-06-28 PROCEDURE — 80048 BASIC METABOLIC PNL TOTAL CA: CPT | Performed by: STUDENT IN AN ORGANIZED HEALTH CARE EDUCATION/TRAINING PROGRAM

## 2022-06-28 PROCEDURE — 97163 PT EVAL HIGH COMPLEX 45 MIN: CPT

## 2022-06-28 PROCEDURE — 97167 OT EVAL HIGH COMPLEX 60 MIN: CPT

## 2022-06-28 PROCEDURE — 99222 1ST HOSP IP/OBS MODERATE 55: CPT | Performed by: INTERNAL MEDICINE

## 2022-06-28 RX ORDER — DIPHENHYDRAMINE HYDROCHLORIDE 50 MG/ML
25 INJECTION INTRAMUSCULAR; INTRAVENOUS EVERY 6 HOURS PRN
Status: DISCONTINUED | OUTPATIENT
Start: 2022-06-28 | End: 2022-06-30

## 2022-06-28 RX ORDER — POTASSIUM CHLORIDE 20 MEQ/1
20 TABLET, EXTENDED RELEASE ORAL 2 TIMES DAILY
Status: COMPLETED | OUTPATIENT
Start: 2022-06-28 | End: 2022-06-28

## 2022-06-28 RX ORDER — POTASSIUM CHLORIDE 20 MEQ/1
40 TABLET, EXTENDED RELEASE ORAL 2 TIMES DAILY
Status: DISCONTINUED | OUTPATIENT
Start: 2022-06-28 | End: 2022-06-28

## 2022-06-28 RX ORDER — PANTOPRAZOLE SODIUM 40 MG/1
40 TABLET, DELAYED RELEASE ORAL
Status: DISCONTINUED | OUTPATIENT
Start: 2022-06-28 | End: 2022-07-04 | Stop reason: HOSPADM

## 2022-06-28 RX ORDER — OXYCODONE HYDROCHLORIDE 5 MG/1
5 TABLET ORAL EVERY 4 HOURS PRN
Status: DISCONTINUED | OUTPATIENT
Start: 2022-06-28 | End: 2022-07-04 | Stop reason: HOSPADM

## 2022-06-28 RX ORDER — OXYCODONE HYDROCHLORIDE 5 MG/1
2.5 TABLET ORAL EVERY 4 HOURS PRN
Status: DISCONTINUED | OUTPATIENT
Start: 2022-06-28 | End: 2022-07-04 | Stop reason: HOSPADM

## 2022-06-28 RX ORDER — HYDROMORPHONE HCL IN WATER/PF 6 MG/30 ML
0.2 PATIENT CONTROLLED ANALGESIA SYRINGE INTRAVENOUS
Status: DISCONTINUED | OUTPATIENT
Start: 2022-06-28 | End: 2022-07-03

## 2022-06-28 RX ADMIN — NICOTINE 1 PATCH: 14 PATCH, EXTENDED RELEASE TRANSDERMAL at 08:46

## 2022-06-28 RX ADMIN — PANTOPRAZOLE SODIUM 40 MG: 40 INJECTION, POWDER, FOR SOLUTION INTRAVENOUS at 08:46

## 2022-06-28 RX ADMIN — LEVETIRACETAM 500 MG: 500 TABLET, FILM COATED ORAL at 08:46

## 2022-06-28 RX ADMIN — IRON SUCROSE 200 MG: 20 INJECTION, SOLUTION INTRAVENOUS at 14:43

## 2022-06-28 RX ADMIN — PANTOPRAZOLE SODIUM 40 MG: 40 TABLET, DELAYED RELEASE ORAL at 10:16

## 2022-06-28 RX ADMIN — ACETAMINOPHEN 975 MG: 325 TABLET, FILM COATED ORAL at 14:47

## 2022-06-28 RX ADMIN — METOPROLOL SUCCINATE 100 MG: 100 TABLET, EXTENDED RELEASE ORAL at 08:46

## 2022-06-28 RX ADMIN — ONDANSETRON 4 MG: 2 INJECTION INTRAMUSCULAR; INTRAVENOUS at 08:51

## 2022-06-28 RX ADMIN — GABAPENTIN 300 MG: 300 CAPSULE ORAL at 08:46

## 2022-06-28 RX ADMIN — POTASSIUM CHLORIDE 20 MEQ: 1500 TABLET, EXTENDED RELEASE ORAL at 08:51

## 2022-06-28 RX ADMIN — ATORVASTATIN CALCIUM 40 MG: 40 TABLET, FILM COATED ORAL at 08:46

## 2022-06-28 RX ADMIN — POTASSIUM CHLORIDE 20 MEQ: 1500 TABLET, EXTENDED RELEASE ORAL at 16:44

## 2022-06-28 RX ADMIN — LEVETIRACETAM 500 MG: 500 TABLET, FILM COATED ORAL at 20:48

## 2022-06-28 RX ADMIN — OXYCODONE HYDROCHLORIDE 5 MG: 5 TABLET ORAL at 14:47

## 2022-06-28 RX ADMIN — POTASSIUM PHOSPHATE, MONOBASIC AND POTASSIUM PHOSPHATE, DIBASIC 21 MMOL: 224; 236 INJECTION, SOLUTION, CONCENTRATE INTRAVENOUS at 10:15

## 2022-06-28 RX ADMIN — OXYCODONE HYDROCHLORIDE 5 MG: 5 TABLET ORAL at 05:54

## 2022-06-28 RX ADMIN — DULOXETINE HYDROCHLORIDE 60 MG: 60 CAPSULE, DELAYED RELEASE ORAL at 08:46

## 2022-06-28 NOTE — ASSESSMENT & PLAN NOTE
S/p fall on Eliquis, received [address]  TEG obtained on arrival to [facility] without coagulopathies  Second head CT noncontrast-impression denoting   Stable CT appearance of the brain with acute left frontal parietal subdural resulting in only minimal mass effect    Several areas of contusion in the infarcted parenchyma within the posterior right hemisphere are stable "  Continue to DVT prophylaxis for EGD to be performed today  Neurosurgery with no surgical intervention with d/c hot protocol  -Keppra seizure prophylaxis  -Continue to hold AC/AP  -PT OT evaluation and treatment  Continue to monitor neurological status with f/u ct if gcs drops points or greater; current GCS 15

## 2022-06-28 NOTE — OCCUPATIONAL THERAPY NOTE
Occupational Therapy Evaluation     Patient Name: Nik Salazar  DQPQH'B Date: 6/28/2022  Problem List  Principal Problem:    Subdural hematoma ([facility] )  Active Problems:    GIB (gastrointestinal bleeding)    Hemiplegia, post-stroke ([facility] [address] )    Acute blood loss anemia    Past Medical History  Past Medical History:   Diagnosis Date    Hypertension      Past Surgical History  Past Surgical History:   Procedure Laterality Date    HAND SURGERY           06/28/22 1041   OT Last Visit   OT Visit Date 06/28/22   Note Type   Note type Evaluation   Restrictions/Precautions   Weight Bearing Precautions Per Order No   Other Precautions Cognitive; Chair Alarm; Bed Alarm; Fall Risk;Pain;Visual impairment   Pain Assessment   Pain Assessment Tool 0-10   Pain Score 8   Pain Location/Orientation Location: Head;Location: Back   Patient's Stated Pain Goal No pain   Hospital Pain Intervention(s) Ambulation/increased activity; Emotional support;Repositioned; Environmental changes   Home Living   Type of Home House  (2-3 natan enter)   Home Layout Two level;Bed/bath upstairs  (Pt uses bedside commode to complete toileting needs on 1st floor)   Bathroom Shower/Tub Tub/shower unit   [employer]  (Uses commode on first floor for toileting )   Home Equipment Walker;Cane  (Pt uses a cane at baseline to complete functional mobility)   Additional Comments Pt lives with 3 other roommates  Prior Function   Level of [place] Needs assistance with IADLs; Needs assistance with ADLs and functional mobility   Lives With Other (Comment)  (Lives with 3 other roommates)   Receives Help From Personal care attendant  (Recieves help from care giver who comes daily for 8 hrs )   ADL Assistance Needs assistance   IADLs Needs assistance   Falls in the last 6 months 1 to 4  (Pt reports 1 fall 3 days ago)   Comments Pt is a questionable historian  Pt reports falling 3 days ago after getting in an argument with on eof his "idiot" roomates     Lifestyle Autonomy Pt was I with toileting and functional mobility  Pt required assistance with dressing and bathing and IADLs from care giver  PT no longer drives due to visual impairment  Reciprocal Relationships Pt appears to be a poor historian  Pt lives with roommates  Pt reports he recieves assistance from a caregiver daily for 8 hrs  Service to Others Pt is retired  Pt previously was a carreon  Intrinsic Gratification Pt enjoys spending time with roommates  Psychosocial   Psychosocial (WDL) WDL   ADL   Eating Assistance 5  Supervision/Setup   Grooming Assistance 4  Minimal Assistance   UB Bathing Assistance 3  Moderate Assistance   LB Bathing Assistance 2  Maximal Assistance   UB Dressing Assistance 3  Moderate Assistance   LB Dressing Assistance 2  Maximal [address]  2  Maximal Assistance   Functional Assistance 3  Moderate Assistance   Bed Mobility   Supine to Sit 3  Moderate assistance   Additional items Assist x 2; Increased time required   Additional Comments Pt is mod-min for sitting EOB  Pt lost balance laterally and posterially  Pt could did not correct posture, required assistance  Transfers   Sit to Stand 3  Moderate assistance   Additional items Assist x 2; Increased time required   Stand to Sit 3  Moderate assistance   Additional items Assist x 2; Increased time required   Additional Comments Pt left OOB in chair with all needed items within reach and chair alarm  Functional Mobility   Functional Mobility 3  Moderate assistance   Additional Comments x2   Additional items Hand hold assistance   Balance   Static Sitting Poor   Dynamic Sitting Poor   Static Standing Poor -   Dynamic Standing Poor -   Ambulatory Poor -   Activity Tolerance   Activity Tolerance Patient limited by fatigue;Patient limited by pain   Medical Staff Made Aware Chinyere TAYLOR; PT was present due to medical presentation overall impacting occupational performance     Nurse Made Aware Pt appropriate to be seen    RUE Assessment   RUE Assessment WNL   LUE Assessment   LUE Assessment X  (Due to history of CVA, pt presents L walt with contraction of LUE with impaired ROM of LUE and L hand, and impaired functional gasp of L hand )   Hand Function   Gross Motor Coordination Impaired  (LUE)   Fine Motor Coordination Impaired  (L hand/fingers impaired ROM and functional grasp)   Vision-Basic Assessment   Patient Visual Report   (Pt reports he has a visual impairment in his L eye due to past CVA  Pt reports being sensitive to light )   Cognition   Overall Cognitive Status Impaired   Arousal/Participation Cooperative   Attention Attends with cues to redirect   Orientation Level Oriented X4   Memory Decreased long term memory;Decreased short term memory   Following Commands Follows one step commands with increased time or repetition   Comments Pt is a questionable historian  Pt reports difficulty attending to task which appears to be acute  Pt has impaired sustained attention  Pt followed directions inconsistently  Pt requires verbal cues for safety  Pt lacks judgement, insight, and safety awareness  Assessment   Limitation Decreased ADL status; Decreased UE ROM; Decreased Safe judgement during ADL;Decreased cognition;Decreased endurance;Decreased self-care trans;Decreased high-level ADLs   Prognosis Fair   Assessment Yaneth Licona is a 79 yo (RHD) M transferred from [facility] to [facility] with c/o of abdominal pain, and one-week history of coffee-ground emesis  Pt dx GIB  Per pt's sister, the aide recognized 3 days ago that pt may have fell resulting in L eye bruising  Imaging reveals pt sustained an acute SDH within L hemisphere, small hemorrhagic contusions, and area of encephalomalacia related to previous infarct  Pt's PMH includes: GIB, tobacco use, major depressive disorder, GERD, essential HTN, hemiplegia post-stroke, chronic pain syndrome, and history of substance abuse   Pt's prior level of function was I with functional mobility and toileting while requiring assistance with dressing, bathing and IADLs living in a 2 story house with three roommates  Pt receives assistance from caregiver daily for 8 hours  Currently, pt is supervision with eating, Min A with grooming, Mod A with UB ADLs, Max A with LB ADLs and toileting  Pt is Mod A x 2 with functional transfers/mobility with HHA  Pt's limitations include: pain, fatigue, vision impairment, impaired ROM of LUE, impaired functional grasp of L hand, decreased balance, decreased activity tolerance, decreased insight, decreased judgement, decreased safety awareness, decreased ability to complete ADL tasks, and lack of support  Pt was educated on compensatory techniques, importance of repositioning to increase activity tolerance, positioning for walt side (L), and increased participation with nursing staff to complete self-care tasks  The patient's raw score on the AM-PAC Daily Activity inpatient short form is 15, standardized score is 34 69, less than 39 4  Patients at this level are likely to benefit from discharge to post-acute rehabilitation services  Please refer to the recommendation of the Occupational Therapist for safe discharge planning  OT recommends pt receives additional skilled OT services at [facility] rehab  OT will continue to follow to goals listed below  Goals   Patient Goals To have less pain  LTG Time Frame 10-14   Long Term Goal #1 See below   Plan   Treatment Interventions ADL retraining;Visual perceptual retraining; Endurance training;Cognitive reorientation;Equipment evaluation/education; Compensatory technique education; Energy conservation; Activityengagement   Goal Expiration Date 07/12/22   OT Frequency 3-5x/wk   Recommendation   OT Discharge Recommendation Post acute rehabilitation services   OT - OK to Discharge Yes   AM-PAC Daily Activity Inpatient   Lower Body Dressing 2   Bathing 2   Toileting 2   Upper Body Dressing 2   Grooming 3   Eating 3   Daily Activity Raw Score 14 Daily Activity Standardized Score (Calc for Raw Score >=11) 33 39   AM-PAC Applied Cognition Inpatient   Following a Speech/Presentation 2   Understanding Ordinary Conversation 3   Taking Medications 3   Remembering Where Things Are Placed or Put Away 3   Remembering List of 4-5 Errands 2   Taking Care of Complicated Tasks 2   Applied Cognition Raw Score 15   Applied Cognition Standardized Score 33 54     Pt will complete bed mobility at a Mod I level to increase independence for OOB functional tasks  Pt will sit EOB unsupported for 5 minutes to increase to F+ static/dynamic sitting balance    Pt will complete UB/LB dressing at a Min A level to decrease caregiver burden  Pt will complete functional transfers/mobility at a Mod I level with F+ balance and safety  Pt will complete toileting at a Mod I level to increase participation in self care    Pt will complete a formal cognitive assessment to determine cognitive deficits to assist in a safe discharge  Pt will attend to a 5 min ADL task without verbal cues for redirection       Artelia Setting, OTS

## 2022-06-28 NOTE — TELEPHONE ENCOUNTER
6/28/2022 - MATTHIEU Gutierrez;  Shelby Brown  Can someone please schedule a 2 week hospital follow up with NEETA, [facility] prior      6/28/2022 I scheduled ct head nad 2 week f/u will call to advise pt of these hes currently in hospital -ba

## 2022-06-28 NOTE — DISCHARGE INSTRUCTIONS
Neurosurgery discharge instructions following traumatic head bleed:     Do not take any blood thinning medications (ie  No Advil  No motrin  No ibuprofen  No Aleve  No Aspirin  No fishoil  No heparin  No antiplatelet / no anticoagulation medication)  Do not take Eliquis until cleared by neurosurgery  Refrain from activity that increases chance of trauma to head or falls  Recommend you take fall precaution  No strenuous activity or sports  Return to hospital Emergency Room if you experience worsening / new headache, nausea/vomiting, speech/vision change, seizure, confusion / mental status change, weakness, or other neurological changes  Follow-up as scheduled with a repeat CT head without contrast to be completed 2-3 days prior to visit  Prescription has been entered electronically  Please call  to schedule

## 2022-06-28 NOTE — CONSULTS
Consult Service: Gastroenterology      PATIENT INFORMATION      Apollo Conway 80 y o  male MRN: [id number]  Unit/Bed#: [facility] 603-01 Encounter: [id number]  PCP: Sharee Pringle DO  Date of Admission:  6/27/2022  Date of Consultation: 06/28/22  Requesting Physician: Abiola Rankin MD       ASSESSMENTS & PLAN   Apollo Conway is a 80 y o  old male with PMH of atrial fibrillation on eliquis, HTN, GERD, HLD, CVA who presents with fall and subdural hematoma     Gastroenterology has been consulted for assistance with management of hematemesis    Hematemesis/vomiting  · Now resolved  · Reportedly had multiple episodes of bloody vomitus prior to presentation, none in the hospital  · Suspect secondary to subdural hematoma after fall  · Currently no further episodes and hemoglobin has remained stable  · After offering endoscopic intervention to patient and discussing risks and benefits we decided to manage this conservatively due to stable hemoglobin and resolution of symptoms  · Can start patient on a diet  · Ok to continue protonix and monitor hemoglobin  · Outpatient gastroenterology follow-up at which time both upper endoscopy and colonoscopy can be considered due to h/o chronic diarrhea and low ferritin  · Ok to start anticoagulation from a GI standpoint whenever ok from neurosurgery standpoint    Subdural hematoma  · Secondary to fall  · Eliquis held and reversed with [facility]  · Neurosurgical evaluation pending at [address] is a 80 y o  male who is originally admitted for fall and is being consulted for hematemesis  Patient is a poor historian but reports that he had multiple episodes of vomiting before presenting to the hospital   He reports that some of these episodes had blood in it    He reports that he has not had any nausea or vomiting since being in the hospital  Patient denies abdominal pain, heartburn, dysphagia, constipation, diarrhea, blood in stools currently  REVIEW OF SYSTEMS     A thorough 12-point review of systems has been conducted  Pertinent positives and negatives are mentioned in the history of present illness  PAST MEDICAL & SURGICAL HISTORY      Past Medical History:   Diagnosis Date    Hypertension        Past Surgical History:   Procedure Laterality Date    HAND SURGERY           MEDICATIONS & ALLERGIES       Medications:   Prior to Admission medications    Medication Sig Start Date End Date Taking? Authorizing Provider   apixaban (Eliquis) 5 mg Take 5 mg by mouth 2 (two) times a day    Historical Provider, MD   atorvastatin (LIPITOR) 40 mg tablet Take 40 mg by mouth daily  Patient not taking: Reported on 6/27/2022    Historical Provider, MD   cholecalciferol (VITAMIN D3) 1,000 units tablet Take 50,000 Units by mouth once a week    Historical Provider, MD   DULoxetine (CYMBALTA) 60 mg delayed release capsule Take 60 mg by mouth daily    Historical Provider, MD   folic acid (FOLVITE) 1 mg tablet Take 1 mg by mouth daily    Historical Provider, MD   gabapentin (NEURONTIN) 600 MG tablet Take 1 tablet (600 mg total) by mouth 3 (three) times a day  Patient taking differently: Take 300 mg by mouth daily 6/11/20 7/11/20  BANG Louis   meloxicam (MOBIC) 15 mg tablet Take 15 mg by mouth daily    Historical Provider, MD   metoprolol succinate (TOPROL-XL) 100 mg 24 hr tablet Take 100 mg by mouth daily    Historical Provider, MD   omeprazole (PriLOSEC) 40 MG capsule Take 40 mg by mouth daily    Historical Provider, MD   terbinafine (LamISIL) 250 mg tablet Take 250 mg by mouth daily For 12 weeks for toenail fungus, picked up on 5/12/22    Historical Provider, MD       Allergies:    Allergies   Allergen Reactions    Lisinopril Swelling     face swelling      Vicodin Hp [Hydrocodone-Acetaminophen] Abdominal Pain    Oxycodone Rash     No prescribed substances to be prescribed given failure of urine tox screen           SOCIAL HISTORY Marital Status: Single    Substance Use History:   Social History     Substance and Sexual Activity   Alcohol Use Yes    Comment: occassional     Social History     Tobacco Use   Smoking Status Current Every Day Smoker    Packs/day: 0 50   Smokeless Tobacco Never Used     Social History     Substance and Sexual Activity   Drug Use Never         FAMILY HISTORY      Non-Contributory      PHYSICAL EXAM     Vitals:   Blood Pressure: 125/62 (06/28/22 0626)  Pulse: (!) 49 (06/28/22 0626)  Temperature: (!) 97 1 °F (36 2 °C) (06/28/22 0626)  Temp Source: Oral (06/28/22 0105)  Respirations: 17 (06/28/22 0626)  Height: 5' 10" (177 8 cm) (06/27/22 1810)  Weight - Scale: 60 5 kg (133 lb 6 1 oz) (06/27/22 1810)  SpO2: 95 % (06/28/22 0626)    Physical Exam:   GENERAL: NAD  HEENT:  NC/AT, MMM  CARDIAC:  RRR, +S1/S2, no S3/S4 heard  PULMONARY:  CTA B/L, no wheezing/rales/rhonci, non-labored breathing  ABDOMEN:  Soft, NT/ND, +BS, no rebound/guarding/rigidity  DIGITAL RECTAL EXAM: not indicated   NEUROLOGIC:  Alert/oriented x3  SKIN:  No rashes or erythema       ADDITIONAL DATA     Lab Results:     Results from last 7 days   Lab Units 06/28/22  0604   WBC Thousand/uL 6 41   HEMOGLOBIN g/dL 9 7*   HEMATOCRIT % 31 5*   PLATELETS Thousands/uL 189   NEUTROS PCT % 54   LYMPHS PCT % 34   MONOS PCT % 9   EOS PCT % 2     Results from last 7 days   Lab Units 06/28/22  0604 06/27/22  1856 06/27/22  0230   POTASSIUM mmol/L 3 3*   < > 3 5   CHLORIDE mmol/L 107   < > 97   CO2 mmol/L 31   < > 28   BUN mg/dL 15   < > 23   CREATININE mg/dL 0 66   < > 0 75   CALCIUM mg/dL 8 2*   < > 9 2   ALK PHOS U/L  --   --  75   ALT U/L  --   --  11   AST U/L  --   --  21    < > = values in this interval not displayed  Results from last 7 days   Lab Units 06/27/22  1302   INR  1 06       Imaging:    XR chest 1 view portable    Result Date: 6/27/2022  Narrative: CHEST INDICATION:   pain   COMPARISON:  7/17/2017 EXAM PERFORMED/VIEWS:  XR CHEST PORTABLE 3:15 AM FINDINGS: Heart is normal in size  Tortuous thoracic aorta again noted  There may be aneurysmal dilatation of the ascending aorta  Lungs are mildly hyperinflated but appear clear  No pneumothorax or effusion is appreciated  Osseous structures appear within normal limits for patient age  Impression: 1  No acute cardiopulmonary disease noted  2   Tortuous thoracic aorta with possible mild aneurysmal dilatation of descending aorta  Workstation performed: [id number]     XR shoulder 2+ vw left    Result Date: 6/27/2022  Narrative: LEFT SHOULDER INDICATION:  Left shoulder pain, trauma  COMPARISON:  6/21/2019 VIEWS:  XR SHOULDER 2+ VW LEFT Images: 3 FINDINGS: There is no acute fracture or dislocation  Mild osteoarthritis acromioclavicular joint  No lytic or blastic osseous lesion  Soft tissues are unremarkable  The visualized left hemithorax appears intact  Degenerative changes visualized gastric spine  Impression: No acute osseous abnormality  Degenerative changes as described  Workstation performed: [id number]     XR shoulder 2+ vw right    Result Date: 6/27/2022  Narrative: RIGHT SHOULDER INDICATION: Right shoulder pain, trauma  COMPARISON:  6/21/2019 VIEWS:  XR SHOULDER 2+ VW RIGHT Images: 3 FINDINGS: There is no acute fracture or dislocation  Minimal, chronic elevation of the right clavicle in relation to the acromion  Patient had grade 3 right AC joint separation on remote imaging  No significant degenerative changes  No lytic or blastic osseous lesion  Soft tissues are unremarkable  The visualized right hemithorax appears intact  Impression: No acute osseous abnormality  Workstation performed: [id number]     CT head wo contrast    Result Date: 6/28/2022  Narrative: CT BRAIN - WITHOUT CONTRAST INDICATION:   Acute SDH  COMPARISON:  CT 6/27/2022 TECHNIQUE:  CT examination of the brain was performed    In addition to axial images, sagittal and coronal 2D reformatted images were created and submitted for interpretation  Radiation dose length product (DLP) for this visit:  923 06 mGy-cm   This examination, like all CT scans performed in the [facility], was performed utilizing techniques to minimize radiation dose exposure, including the use of iterative  reconstruction and automated exposure control  IMAGE QUALITY:  Diagnostic  FINDINGS: PARENCHYMA:  No substantive change in acute subdural hematoma extending over the frontal parietal region on the left  Maximum width of approximately 13 to 14 mm in the posterior parietal region  Local mass effect, with very minimal left-to-right shift  Remote right parieto-occipital infarct is stable, where scattered areas encephalomalacia and gliosis are stable  Scattered High density likely representing contusion are noted, stable in appearance, with no evidence for any significant mass effect  Some extension of the blood along the posterior body of the right lateral ventricle is stable  VENTRICLES AND EXTRA-AXIAL SPACES:  Focal ex vacuo hydrocephalus  VISUALIZED ORBITS AND PARANASAL SINUSES:  Progressive opacification of the right maxillary and anterior right ethmoid sinus CALVARIUM AND EXTRACRANIAL SOFT TISSUES:  Normal      Impression: Stable CT appearance of the brain with acute left frontal parietal subdural resulting in only minimal mass effect  Several areas of contusion in the infarcted parenchyma within the posterior right hemisphere are stable  Workstation performed: [id number]     CT head wo contrast    Result Date: 6/27/2022  Narrative: CT BRAIN - WITHOUT CONTRAST INDICATION:   trauma/fall  COMPARISON:  None  TECHNIQUE:  CT examination of the brain was performed  In addition to axial images, sagittal and coronal 2D reformatted images were created and submitted for interpretation  Radiation dose length product (DLP) for this visit:  951 mGy-cm     This examination, like all CT scans performed in the [facility], was performed utilizing techniques to minimize radiation dose exposure, including the use of iterative reconstruction and automated exposure control  IMAGE QUALITY:  Diagnostic  FINDINGS: PARENCHYMA:  There is an acute subdural hematoma within the left hemisphere, mainly peripheral to the frontal and parietal lobes  At its largest point, this measures approximately 1 4 cm from the inner table of the calvarium on coronal imaging, see image 73 of coronal imaging  There is only minimal mass effect upon the adjacent brain parenchyma with no subfalcine or transtentorial herniation  Small acute hemorrhagic contusions are seen within the area of encephalomalacia within the posterior right hemisphere including the right occipital lobe and within the right frontal parietal white matter adjacent to the posterior body and atria of the lateral ventricle  Because of the surrounding encephalomalacia, these do not result in mass effect upon the adjacent brain  Additional scattered decreased attenuation consistent with chronic microangiopathic change  Mild parenchymal volume loss  VENTRICLES:  No ventriculomegaly  VISUALIZED ORBITS AND PARANASAL SINUSES:  Unremarkable  CALVARIUM AND EXTRACRANIAL SOFT TISSUES:  Normal      Impression: Acute subdural hematoma within the left hemisphere superficial to the frontal and parietal lobes with only minimal mass effect upon the adjacent brain parenchyma  Small hemorrhagic contusions are seen within the posterior aspect of the right hemisphere, within an area of encephalomalacia related to previous old infarct  No resulting mass effect  Additional chronic microangiopathic change within the brain parenchyma  I personally discussed this study with VIKKI ALBARADO on 6/27/2022 at 2:10 PM  Workstation performed: [id number]     CT facial bones wo contrast    Result Date: 6/27/2022  Narrative: CT FACIAL BONES WITHOUT INTRAVENOUS CONTRAST INDICATION:   trauma   COMPARISON: None  TECHNIQUE:  Axial CT images were obtained through the facial bones with additional sagittal and coronal reconstructions  Radiation dose length product (DLP) for this visit:  335 mGy-cm   This examination, like all CT scans performed in the [facility], was performed utilizing techniques to minimize radiation dose exposure, including the use of iterative reconstruction and automated exposure control  IMAGE QUALITY:  Diagnostic  FINDINGS: FACIAL BONES:   No facial bone fracture identified  Normal alignment of the temporomandibular joints  No lytic or blastic lesion  ORBITS:  Orbital globes, optic nerves, and extraocular muscles appear symmetric and normal  There is no evidence of retrobulbar mass, abscess, or hematoma  SINUSES:  Mild mucosal thickening of the right maxillary sinus  SOFT TISSUES:  Mild soft tissue thickening is seen superficial to the left frontal bone and orbit suggesting soft tissue contusion  No hematoma identified  There is an acute subdural hematoma within the left hemisphere  See separate CT brain  Impression: No acute osseous abnormality  Subtle left facial soft tissue contusion superficial to the frontal bone and orbit  There is  an acute subdural hematoma within the left hemisphere  See separate CT brain report  Workstation performed: [id number]     CT spine cervical wo contrast    Result Date: 6/27/2022  Narrative: CT CERVICAL SPINE - WITHOUT CONTRAST INDICATION:   Neck trauma (Age >= 65y) trauma  COMPARISON:  None  TECHNIQUE:  CT examination of the cervical spine was performed without intravenous contrast   Contiguous axial images were obtained  Sagittal and coronal reconstructions were performed  Radiation dose length product (DLP) for this visit:  507 mGy-cm     This examination, like all CT scans performed in the [facility], was performed utilizing techniques to minimize radiation dose exposure, including the use of iterative reconstruction and automated exposure control  IMAGE QUALITY:  Diagnostic  FINDINGS: ALIGNMENT:  Normal alignment of the cervical spine  No subluxation  VERTEBRAL BODIES:  No fracture  DEGENERATIVE CHANGES:  Multilevel cervical degenerative change  There is slightly more pronounced foraminal narrowing at the C5-6 level and C6-7 levels, moderate due to endplate and uncinate joint hypertrophic osteophyte formation  There is moderate canal stenosis from C4-5 through C6-7  PREVERTEBRAL AND PARASPINAL SOFT TISSUES:  Vascular calcifications of the common carotid artery bifurcations bilaterally  THORACIC INLET:  Mild emphysema within the lung apices  Impression: Cervical degenerative change with moderate canal stenosis and foraminal narrowing  No acute osseous abnormality  Workstation performed: [id number]       EKG, Pathology, and Other Studies Reviewed on Admission:   · EKG: Reviewed      Counseling / Coordination of Care Time: 30 total mins spent n consult  Greater than 50% of total time spent on patient counseling and coordination of care  Stella Frederick MD   PGY-4,   Gastroenterology         ** Please Note: This note is constructed using a voice recognition dictation system   **

## 2022-06-28 NOTE — ASSESSMENT & PLAN NOTE
Left acute SDH, right hemorrhagic contusions within old right posterior stroke infarct 2/2 fall 4 days ago  · On Eliquis for h/o stroke, reversed with [facility]  · Baseline left hemiplegia 2/2 stroke, otherwise no new neuro deficits on exam    Imaging personally reviewed with attending:  · CT head w/o, 6/27/22: Acute subdural hematoma within the left hemisphere superficial to the frontal and parietal lobes with only minimal mass effect upon the adjacent brain parenchyma  Small hemorrhagic contusions are seen within the posterior aspect of the right hemisphere, within an area of encephalomalacia related to previous old infarct  No resulting mass effect  · CT head w/o, 6/28/22: Stable CT appearance of the brain with acute left frontal parietal subdural resulting in only minimal mass effect  Several areas of contusion in the infarcted parenchyma within the posterior right hemisphere are stable  Plan:  · Continue frequent neurological checks  · STAT CT head with any neurological decline including drop GCS of 2pts within 1 hr   · Recommend SBP <160mmHg  · Keppra per primary team  · Hold all antiplatelet and anticoagulation medications  · Pain control per primary team  · Mobilize with PT/OT  · DVT PPX: SCDs, ok for pharm ppx  · Ongoing medical management per primary team/trauma  · No neurosurgical intervention indicated at this juncture  Neurosurgery will sign off  Patient will follow up in 2 weeks with repeat CT head  Continue to hold Eliquis  Please call with questions or concerns

## 2022-06-28 NOTE — PHYSICAL THERAPY NOTE
Physical Therapy Evaluation    Patient's Name: Bibi Ventura    Admitting Diagnosis  SDH    Problem List  Patient Active Problem List   Diagnosis    Cervical spinal stenosis    Cervical spondylosis    Cervical radiculopathy    Neck pain    Cervical disc disorder with radiculopathy of mid-cervical region    Long-term current use of opiate analgesic    Uncomplicated opioid dependence (HCC)    Chronic pain syndrome    Lumbar spondylosis    Chronic bilateral low back pain without sciatica    GIB (gastrointestinal bleeding)    Hemiplegia, post-stroke (HCC)    Essential hypertension    GERD (gastroesophageal reflux disease)    Major depressive disorder    Tobacco use    Subdural hematoma (HCC)    Acute blood loss anemia       Past Medical History  Past Medical History:   Diagnosis Date    Hypertension        Past Surgical History  Past Surgical History:   Procedure Laterality Date    HAND SURGERY          06/28/22 1040   PT Last Visit   PT Visit Date 06/28/22   Note Type   Note type Evaluation   Pain Assessment   Pain Assessment Tool 0-10   Pain Score 9   Pain Location/Orientation Orientation: Bilateral;Location: Head   Hospital Pain Intervention(s) Repositioned; Ambulation/increased activity   Restrictions/Precautions   Weight Bearing Precautions Per Order No   Braces or Orthoses   (pt states he has L AFO, however does not wear)   Other Precautions Cognitive; Chair Alarm; Bed Alarm; Fall Risk;Pain;Multiple lines   Home Living   Type of [facility] Two level;Stairs to enter with rails  (FFSU with BSC, 2-3 PAULO)   Home Equipment Cane   Prior Function   Level of [place] Independent with ADLs and functional mobility; Needs assistance with IADLs   Lives With Other (Comment)  (rommates)   Receives Help From Personal care attendant  (HHA)   Falls in the last 6 months 1 to 4  (at least 1 leading to admission)   Comments Pt is a questionable historian, states he had a fall 3 days ago while arguing with a roommate    Pt states he has HHA 8 hours/day daily to assist in care   General   Family/Caregiver Present No   Cognition   Overall Cognitive Status Impaired   Arousal/Participation Responsive   Orientation Level Oriented X4  (grossly)   Following Commands Follows one step commands with increased time or repetition   Comments Pt cooperative, incosinstent command following, easily distracted, requires cues for safety   RLE Assessment   RLE Assessment WFL   LLE Assessment   LLE Assessment X   Tone LLE   LLE Tone Hypertonic  (3 Modified Nkechi Scale (+) clonus)   Bed Mobility   Supine to Sit 3  Moderate assistance   Additional items Assist x 2; Increased time required;Verbal cues   Additional Comments min-modA to maintain sitting balance at EOB, L lateral and posterior LOB   Transfers   Sit to Stand 3  Moderate assistance   Additional items Assist x 2; Increased time required;Verbal cues   Stand to Sit 3  Moderate assistance   Additional items Assist x 2; Increased time required;Verbal cues   Additional Comments B/L HHA for safety   Ambulation/Elevation   Gait pattern L Knee Veronica; Improper Weight shift;Decreased foot clearance; Forward Flexion   Gait Assistance 3  Moderate assist   Additional items Assist x 2   Assistive Device Other (Comment)  (B/L HHA)   Distance 3 ft to chair   Balance   Static Sitting Poor   Dynamic Sitting Poor   Static Standing Poor -   Dynamic Standing Poor -   Ambulatory Poor -   Activity Tolerance   Activity Tolerance Patient limited by fatigue   Medical Staff Made Aware Co-evaluation with OT given medical complexity and limited activity tolerance   Nurse Made Aware RN updated  Chair alarm engaged   Assessment   Prognosis Fair   Problem List Decreased strength;Decreased endurance; Impaired balance;Decreased mobility; Decreased cognition;Decreased safety awareness;Pain   Assessment Pt is a 80 y o  male seen for PT evaluation s/p admit to [facility] on 6/27/2022   Pt was admitted with a primary dx of: Fall resulting in SDH  Pt evaluated by neurosurgery and cleared for mobility  PT now consulted for assessment of mobility and d/c needs  Pt with Up in chair orders  Pts current comorbidities and personal factors effecting treatment include: CVA resulting in L residual weakness, Afib, HTN, Chronic pain, AAA, limited social support, stairs to enter home  Pts current clinical presentation is Unstable/Unpredictable (high complexity) due to Ongoing medical management for primary dx, Decreased activity tolerance compared to baseline, Fall risk, Cog status, Trending lab values  Prior to admission, pt was independent with use of SC  Upon evaluation, pt currently is requiring ModA x2 for bed mobility; [address] x2 for transfers and [address] x2 for ambulation 3 ft w/ B/L HHA  Pt presents at PT eval functioning below baseline and currently w/ overall mobility deficits 2* to: LLE weakness, impaired balance, decreased endurance, gait deviations, pain, decreased activity tolerance compared to baseline, decreased functional mobility tolerance compared to baseline, decreased safety awareness, fall risk, decreased cognition  Pt currently at a fall risk 2* to impairments listed above  Pt will continue to benefit from skilled acute PT interventions to address stated impairments; to maximize functional mobility; for ongoing pt/ family training; and DME needs  At conclusion of PT session pt returned back in chair and chair alarm engaged with phone and call bell within reach  Pt denies any further questions at this time  Recommend IP rehab upon hospital D/C  Barriers to Discharge Decreased caregiver support   Goals   Patient Goals to walk   STG Expiration Date 07/12/22   Short Term Goal #1 In 14 days pt will be able to: 1  Demonstrate ability to perform all aspects of bed mobility independently to improve functional safety  2  Perform functional transfers with LRAD and supervision to facilitate safe return to previous living environment  3   Ambulate 150 ft with LRAD and supervision with stable vitals to improve safety with household distances and reduce fall risk  4  Improve LE strength grades by 1 to increase ease of functional mobility with transfers and gait  5  Pt will demonstrate improved balance by one grade in order to decrease risk of falls  6  Climb 3 steps with 1 HR with supervision to simulate entrance to home  PT Treatment Day 0   Plan   Treatment/Interventions Functional transfer training;LE strengthening/ROM; Therapeutic exercise; Endurance training;Cognitive reorientation;Patient/family training;Elevations; Equipment eval/education; Bed mobility;Gait training   PT Frequency 3-5x/wk   Recommendation   PT Discharge Recommendation Post acute rehabilitation services   AM-PAC Basic Mobility Inpatient   Turning in Bed Without Bedrails 2   Lying on Back to Sitting on Edge of Flat Bed 1   Moving Bed to Chair 1   Standing Up From Chair 1   Walk in Room 1   Climb 3-5 Stairs 1   Basic Mobility Inpatient Raw Score 7   Turning Head Towards Sound 3   Follow Simple Instructions 2   Low Function Basic Mobility Raw Score 12   Low Function Basic Mobility Standardized Score 18 33   Highest Level Of Mobility   JH-HLM Goal 2: Bed activities/Dependent transfer   JH-HLM Achieved 4: Move to chair/commode       Richard Canchola, PT, DPT, GCS

## 2022-06-28 NOTE — PLAN OF CARE
Problem: PHYSICAL THERAPY ADULT  Goal: Performs mobility at highest level of function for planned discharge setting  See evaluation for individualized goals  Description: Treatment/Interventions: Functional transfer training, LE strengthening/ROM, Therapeutic exercise, Endurance training, Cognitive reorientation, Patient/family training, Elevations, Equipment eval/education, Bed mobility, Gait training          See flowsheet documentation for full assessment, interventions and recommendations  Note: Prognosis: Fair  Problem List: Decreased strength, Decreased endurance, Impaired balance, Decreased mobility, Decreased cognition, Decreased safety awareness, Pain  Assessment: Pt is a 80 y o  male seen for PT evaluation s/p admit to [facility] on 6/27/2022  Pt was admitted with a primary dx of: Fall resulting in SDH  Pt evaluated by neurosurgery and cleared for mobility  PT now consulted for assessment of mobility and d/c needs  Pt with Up in chair orders  Pts current comorbidities and personal factors effecting treatment include: CVA resulting in L residual weakness, Afib, HTN, Chronic pain, AAA, limited social support, stairs to enter home  Pts current clinical presentation is Unstable/Unpredictable (high complexity) due to Ongoing medical management for primary dx, Decreased activity tolerance compared to baseline, Fall risk, Cog status, Trending lab values  Prior to admission, pt was independent with use of SC  Upon evaluation, pt currently is requiring ModA x2 for bed mobility; [address] x2 for transfers and [address] x2 for ambulation 3 ft w/ B/L HHA  Pt presents at PT eval functioning below baseline and currently w/ overall mobility deficits 2* to: LLE weakness, impaired balance, decreased endurance, gait deviations, pain, decreased activity tolerance compared to baseline, decreased functional mobility tolerance compared to baseline, decreased safety awareness, fall risk, decreased cognition   Pt currently at a fall risk 2* to impairments listed above  Pt will continue to benefit from skilled acute PT interventions to address stated impairments; to maximize functional mobility; for ongoing pt/ family training; and DME needs  At conclusion of PT session pt returned back in chair and chair alarm engaged with phone and call bell within reach  Pt denies any further questions at this time  Recommend IP rehab upon hospital D/C  Barriers to Discharge: Decreased caregiver support        PT Discharge Recommendation: Post acute rehabilitation services          See flowsheet documentation for full assessment

## 2022-06-28 NOTE — ASSESSMENT & PLAN NOTE
Presented with coffee ground emesis, suspected upper GI source  EGD to be performed today per GI consult  Continue Protonix 40 mg IV b i d    Trend H&H  NPO status

## 2022-06-28 NOTE — PROGRESS NOTES
[facility]  Progress Note - Ryan Tyson 1950, 80 y o  male MRN: [id number]  Unit/Bed#: [facility] 603-01 Encounter: [id number]  Primary Care Provider: Zi Thayer DO   Date and time admitted to hospital: 6/27/2022  5:41 PM    Acute blood loss anemia  Assessment & Plan  2/2 suspected upper GI bleed  Hgb stable 9 7 last pm (last 24h 9 7/9 6/9 9/9 2)  Currently holding DVT prophylaxis for EGD to be performed today per GI recommendations  Will continue to trend H&H  Transfuse as necessary    Hemiplegia, post-stroke [facility])  Assessment & Plan  Chronic L sided hemiplegia, stable    Continue neuro checks    GIB (gastrointestinal bleeding)  Assessment & Plan  Presented with coffee ground emesis, suspected upper GI source  EGD to be performed today per GI consult  Continue Protonix 40 mg IV b i d  Trend H&H  NPO status      * Subdural hematoma (HCC)  Assessment & Plan  S/p fall on Eliis, received [address]  TEG obtained on arrival to [facility] without coagulopathies  Second head CT noncontrast-impression denoting   Stable CT appearance of the brain with acute left frontal parietal subdural resulting in only minimal mass effect  Several areas of contusion in the infarcted parenchyma within the posterior right hemisphere are stable "  Continue to DVT prophylaxis for EGD to be performed today  Neurosurgery with no surgical intervention with d/c hot protocol  -Keppra seizure prophylaxis  -Continue to hold AC/AP  -PT OT evaluation and treatment  Continue to monitor neurological status with f/u ct if gcs drops points or greater; current GCS 15      TERTIARY TRAUMA SURVEY NOTE    Prophylaxis: Reason for no pharmacologic prophylaxis egd scheduled today    Disposition:  Stable    Code status:  Level 1 - Full Code    Consultants:  Neurosurgery, GI, geriatrics, PT/OT      SUBJECTIVE:     Transfer from: [facility];  [facility]   Mechanism of Injury:Fall    Chief Complaint: head pain    HPI/Last 24 hour events:  Patient will in stable and afebrile; patient currently has DVT prophylaxis being held secondary to EGD to be performed today secondary to symptom of hematemesis  Patient has chronic left hemiplegia Patient has stable hemoglobins and is currently on 40 mEq potassium b i d   IgA in process; iron panel, folate and B12 normal   Second head CT stable subdural hematoma  Patient reports normal bowel movement and is currently on NPO status      Active medications:           Current Facility-Administered Medications:     acetaminophen (TYLENOL) tablet 975 mg, 975 mg, Oral, Q6H PRN, 975 mg at 06/27/22 2101    atorvastatin (LIPITOR) tablet 40 mg, 40 mg, Oral, Daily, 40 mg at 06/28/22 0846    diphenhydrAMINE (BENADRYL) injection 25 mg, 25 mg, Intravenous, Q6H PRN    DULoxetine (CYMBALTA) delayed release capsule 60 mg, 60 mg, Oral, Daily, 60 mg at 06/28/22 0846    gabapentin (NEURONTIN) capsule 300 mg, 300 mg, Oral, Daily, 300 mg at 06/28/22 0846    HYDROmorphone HCl (DILAUDID) injection 0 2 mg, 0 2 mg, Intravenous, Q3H PRN    lactated ringers infusion, 125 mL/hr, Intravenous, Continuous, 125 mL/hr at 06/27/22 1826    levETIRAcetam (KEPPRA) tablet 500 mg, 500 mg, Oral, Q12H [address], 500 mg at 06/28/22 0846    metoprolol succinate (TOPROL-XL) 24 hr tablet 100 mg, 100 mg, Oral, Daily, 100 mg at 06/28/22 0846    nicotine (NICODERM CQ) 14 mg/24hr TD 24 hr patch 1 patch, 1 patch, Transdermal, Daily, 1 patch at 06/28/22 0846    ondansetron (ZOFRAN) injection 4 mg, 4 mg, Intravenous, Q6H PRN, 4 mg at 06/28/22 0851    oxyCODONE (ROXICODONE) IR tablet 2 5 mg, 2 5 mg, Oral, Q4H PRN    oxyCODONE (ROXICODONE) IR tablet 5 mg, 5 mg, Oral, Q4H PRN, 5 mg at 06/28/22 0554    pantoprazole (PROTONIX) EC tablet 40 mg, 40 mg, Oral, Early Morning    potassium chloride (K-DUR,KLOR-CON) CR tablet 20 mEq, 20 mEq, Oral, BID, 20 mEq at 06/28/22 0851    potassium phosphates 21 mmol in sodium chloride 0 9 % 250 mL infusion, 21 mmol, Intravenous, Once      OBJECTIVE:     Vitals:   Vitals:    06/28/22 0626   BP: 125/62   Pulse: (!) 49   Resp: 17   Temp: (!) 97 1 °F (36 2 °C)   SpO2: 95%       Physical Exam:   GENERAL APPEARANCE:  Comfortable  NEURO:  Alert and oriented x3, no new focal deficits; hx of chronic left sided hemiplegia secondary to stroke  HEENT:  EOM intact, no pain on eom, oropharynx clear and patent  CV:  Regular rate and rhythm  LUNGS:  With to auscultation bilaterally  GI:  Soft and nontender, nondistention  :  Urinary catheter; draining  MSK:  Right upper extremity and right lower extremity 4/5 neurovascularly intact; left upper and left lower extremity with hemiplegia; history of stroke  SKIN:  Warm and well perfused      1  Before the illness or injury that brought you to the Emergency, did you need someone to help you on a regular basis? 0=No   2  Since the illness or injury that brought you to the Emergency, have you needed more help than usual to take care of yourself? 1=Yes   3  Have you been hospitalized for one or more nights during the past 6 months (excluding a stay in the Emergency Department)? 1=Yes   4  In general, do you see well? 0=Yes   5  In general, do you have serious problems with your memory? 0=No   6  Do you take more than three different medications everyday? 1=Yes   TOTAL   3     Did you order a geriatric consult if the score was 2 or greater?: yes                I/O:   I/O       06/26 0701 06/27 0700 06/27 0701 06/28 0700 06/28 0701 06/29 0700    P  O    0    Total Intake(mL/kg)   0 (0)    Net   0                 Invasive Devices: Invasive Devices  Report    Peripheral Intravenous Line  Duration           Peripheral IV 06/27/22 Distal;Dorsal (posterior); Left Forearm 1 day    Peripheral IV 06/27/22 Dorsal (posterior); Right Hand 1 day          Drain  Duration           External Urinary Catheter Medium <1 day                  Imaging:   XR chest 1 view portable    Result Date: 6/27/2022  Impression: 1  No acute cardiopulmonary disease noted  2   Tortuous thoracic aorta with possible mild aneurysmal dilatation of descending aorta  Workstation performed: [id number]     XR shoulder 2+ vw left    Result Date: 6/27/2022  Impression: No acute osseous abnormality  Degenerative changes as described  Workstation performed: [id number]     XR shoulder 2+ vw right    Result Date: 6/27/2022  Impression: No acute osseous abnormality  Workstation performed: [id number]     CT head wo contrast    Result Date: 6/28/2022  Impression: Stable CT appearance of the brain with acute left frontal parietal subdural resulting in only minimal mass effect  Several areas of contusion in the infarcted parenchyma within the posterior right hemisphere are stable  Workstation performed: [id number]     CT head wo contrast    Result Date: 6/27/2022  Impression: Acute subdural hematoma within the left hemisphere superficial to the frontal and parietal lobes with only minimal mass effect upon the adjacent brain parenchyma  Small hemorrhagic contusions are seen within the posterior aspect of the right hemisphere, within an area of encephalomalacia related to previous old infarct  No resulting mass effect  Additional chronic microangiopathic change within the brain parenchyma  I personally discussed this study with VIKKI ALBARADO on 6/27/2022 at 2:10 PM  Workstation performed: [id number]     CT facial bones wo contrast    Result Date: 6/27/2022  Impression: No acute osseous abnormality  Subtle left facial soft tissue contusion superficial to the frontal bone and orbit  There is  an acute subdural hematoma within the left hemisphere  See separate CT brain report  Workstation performed: [id number]     CT spine cervical wo contrast    Result Date: 6/27/2022  Impression: Cervical degenerative change with moderate canal stenosis and foraminal narrowing  No acute osseous abnormality  Workstation performed: [id number]       Labs:   CBC:   Lab Results   Component Value Date    WBC 6 41 06/28/2022    HGB 9 7 (L) 06/28/2022    HCT 31 5 (L) 06/28/2022    MCV 98 06/28/2022     06/28/2022    MCH 30 2 06/28/2022    MCHC 30 8 (L) 06/28/2022    RDW 14 5 06/28/2022    MPV 10 3 06/28/2022    NRBC 0 06/28/2022     CMP:   Lab Results   Component Value Date     06/28/2022    CO2 31 06/28/2022    BUN 15 06/28/2022    CREATININE 0 66 06/28/2022    CALCIUM 8 2 (L) 06/28/2022    EGFR 97 06/28/2022     Magnesium:   Lab Results   Component Value Date    MG 2 1 06/28/2022

## 2022-06-28 NOTE — UTILIZATION REVIEW
Inpatient Admission Authorization Request   NOTIFICATION OF INPATIENT ADMISSION/INPATIENT AUTHORIZATION REQUEST   SERVICING FACILITY:   [facility]  Address: [address] [facility], [address] [place], [place] [address]  Tax ID: [id number]  NPI: [id number]  Place of Service: Inpatient [address] [facility] Code: 24     ATTENDING PROVIDER:  Attending Name and NPI#: Simin Akhil, [address]on [[id number]]  Address: [address] [place] [address], [address] [place] [address]  Phone: [phone]     UTILIZATION REVIEW CONTACT:  Faith Liao Utilization   Network Utilization Review Department  Phone: [phone]  Fax: [phone]  Email: [email or web address]     PHYSICIAN ADVISORY SERVICES:  FOR DXMM-AD-BRRJ REVIEW - MEDICAL NECESSITY DENIAL  Phone: [phone]  Fax: [phone]  Email: [email or web address]     TYPE OF REQUEST:  Inpatient Status     ADMISSION INFORMATION:  ADMISSION DATE/TIME: 6/27/22  5:41 PM  PATIENT DIAGNOSIS CODE/DESCRIPTION:  SDH  DISCHARGE DATE/TIME: No discharge date for patient encounter  IMPORTANT INFORMATION:  Please contact Faith Liao directly with any questions or concerns regarding this request  Department voicemails are confidential     Send requests for admission clinical reviews, concurrent reviews, approvals, and administrative denials due to lack of clinical to fax [phone]

## 2022-06-28 NOTE — UTILIZATION REVIEW
Initial Clinical Review    Admission: Date/Time/Statement:   Admission Orders (From admission, onward)     Ordered        06/27/22 1808  Inpatient Admission  Once                      Orders Placed This Encounter   Procedures    Inpatient Admission     Standing Status:   Standing     Number of Occurrences:   1     Order Specific Question:   Level of Care     Answer:   Level 2 Stepdown / HOT [14]     Order Specific Question:   Estimated length of stay     Answer:   More than 2 Midnights     Order Specific Question:   Certification     Answer:   I certify that inpatient services are medically necessary for this patient for a duration of greater than two midnights  See H&P and MD Progress Notes for additional information about the patient's course of treatment  Initial Presentation: 80 y o  male with pmh of SSS, CVA, afib on Eliquis, HTN, GERD, AAA, HLD, hemeplegia, chronic pain was transferred from [facility] to [facility] for a higher level of care  Pt initially presented to [facility] ED with coffee ground emesis x 1 week and was transferred to [address] [place] [address] fro GI eval  On arrival to [address] [place] [address], he was noted to be confused  Pt reports s/p fall 3 days ago with head strike and headache  Workup significant for acute left subdural hematoma and small acute hemorrhagic contusions of the posterior R hemisphere  Given [address] d/t hx of Eliquis and transferred to [facility] for trauma and neurosurgery evaluation and mgt  Upon arrival to [facility], he c/o of headache  GCS 15  Reports last episode of cofee ground emesis was last night  Denies abdominal pain  Reports fall after losing his balance 3 days ago, denies LOC  Plan: Inpatient admission for evaluation and treatment of Hematemesis, acute SDH, hemorrhagic contusions, Facial soft tissue contusion: HOT protocol  Stat labs, including TEG (s/p [address] for history of Eliquis)  Neurosurgery consult  Hold all Anticoagulation  Trend H&H  GI consult  [email or web address]  Protonix 40 mg BID  Prn analgesia  PT/OT     Date: 06/28   Day 2:   GI Consult: Hematemesis: Currently no further episodes  hgb remained stable  Will manage conservatively d/t stable hgb and resolution of symptoms  Can start pt on a diet  Cont Protonix  Cont to mon hgb  F/u OP GI  Neurosurgery Consult; Left acute SDH, right hemorrhagic contusions within old right posterior stroke infarct 2/2 fall 4 days ago  No new neuro deficits on exam  Plan: Continue frequent neurological checks  STAT CT head with any neurological decline including drop GCS of 2pts within 1 hr  Recommend SBP <160mmHg  Keppra  Hold all antiplatelet and anticoagulation medications  Pain control  Mobilize with PT/OT  DVT ppx  No neurosurgical intervention indicated at this juncture  Trauma Notes: Pt c/o headache  GCS 15  SDH stable on rpt imaging  + meth/benzoz on tox screen  Diet as tolerated  SQ hep for DVT- stable CT and hgb  Neurochecks q shift  Hold Eliquis  Cont Keppra  Trend labs daily, electrolytes  PT/OT   Cont PPI       ED Triage Vitals   Temperature Pulse Respirations Blood Pressure SpO2   06/27/22 1810 06/27/22 1810 06/27/22 1810 06/27/22 1810 06/27/22 1810   99 1 °F (37 3 °C) 61 18 133/77 91 %      Temp Source Heart Rate Source Patient Position - Orthostatic VS BP Location FiO2 (%)   06/27/22 1810 06/27/22 2031 06/28/22 0105 06/28/22 0105 --   Oral Monitor Lying Left arm       Pain Score       06/27/22 2101       9          Wt Readings from Last 1 Encounters:   06/27/22 60 5 kg (133 lb 6 1 oz)     Additional Vital Signs:   Date/Time Temp Pulse Resp BP MAP (mmHg) SpO2 O2 Device Patient Position - Orthostatic VS   06/28/22 06:26:41 97 1 °F (36 2 °C) Abnormal  49 Abnormal  17 125/62 83 95 % -- --   06/28/22 02:41:41 97 7 °F (36 5 °C) 50 Abnormal  16 127/69 88 94 % -- --   06/28/22 01:05:38 98 3 °F (36 8 °C) 53 Abnormal  16 129/71 90 95 % -- Lying   06/27/22 22:09:13 98 8 °F (37 1 °C) 79 18 147/79 102 95 % -- --   06/27/22 20:31:10 98 2 °F (36 8 °C) 60 16 147/82 104 93 % -- --   06/27/22 2000 -- -- -- -- -- 92 % None (Room air) --       Pertinent Labs/Diagnostic Test Results:   CT head wo contrast   Final Result by Leeanna Lindo MD (06/28 0800)      Stable CT appearance of the brain with acute left frontal parietal subdural resulting in only minimal mass effect  Several areas of contusion in the infarcted parenchyma within the posterior right hemisphere are stable                    Workstation performed: [id number]         CT head wo contrast    (Results Pending)         Results from last 7 days   Lab Units 06/28/22  1140 06/28/22  0604 06/27/22  2359 06/27/22  1856 06/27/22  1620 06/27/22  0706 06/27/22  0230   WBC Thousand/uL 7 63 6 41  --  7 86  --  8 56 9 68   HEMOGLOBIN g/dL 9 7* 9 7* 9 6* 9 9* 9 2* 9 8* 10 6*   HEMATOCRIT % 31 1* 31 5*  --  31 1*  --  30 4* 33 9*   PLATELETS Thousands/uL 170 189  --  203  --  216 225   NEUTROS ABS Thousands/µL  --  3 54  --  4 84  --  5 82 6 38         Results from last 7 days   Lab Units 06/28/22  1140 06/28/22  0604 06/27/22  1856 06/27/22  0230   SODIUM mmol/L 138 141 141 136   POTASSIUM mmol/L 3 7 3 3* 3 6 3 5   CHLORIDE mmol/L 105 107 107 97   CO2 mmol/L 28 31 28 28   ANION GAP mmol/L 5 3* 6 11   BUN mg/dL 13 15 15 23   CREATININE mg/dL 0 66 0 66 0 71 0 75   EGFR ml/min/1 73sq m 97 97 95 92   CALCIUM mg/dL 8 1* 8 2* 8 3 9 2   MAGNESIUM mg/dL 2 1 2 1  --  1 9   PHOSPHORUS mg/dL 2 3 2 2*  --   --      Results from last 7 days   Lab Units 06/27/22  0230   AST U/L 21   ALT U/L 11   ALK PHOS U/L 75   TOTAL PROTEIN g/dL 6 5   ALBUMIN g/dL 4 0   TOTAL BILIRUBIN mg/dL 0 73         Results from last 7 days   Lab Units 06/28/22  1140 06/28/22  0604 06/27/22  1856 06/27/22  0230   GLUCOSE RANDOM mg/dL 82 82 100 114       Results from last 7 days   Lab Units 06/27/22  0706 06/27/22  0510 06/27/22  0230   HS TNI 0HR ng/L  --   --  8   HS TNI 2HR ng/L  --  9  --    HSTNI D2 ng/L  --  1  --    HS TNI 4HR ng/L 9  --   --    HSTNI D4 ng/L 1 --   --          Results from last 7 days   Lab Units 06/27/22  1302   PROTIME seconds 13 7   INR  1 06   PTT seconds 27             Results from last 7 days   Lab Units 06/27/22  0230   LACTIC ACID mmol/L 1 1                 Results from last 7 days   Lab Units 06/27/22  0954   FERRITIN ng/mL 34         Results from last 7 days   Lab Units 06/27/22  0230   LIPASE u/L 12       Results from last 7 days   Lab Units 06/27/22  1942   AMPH/METH  Positive*   BARBITURATE UR  Negative   BENZODIAZEPINE UR  Negative   COCAINE UR  Negative   METHADONE URINE  Negative   OPIATE UR  Negative   PCP UR  Negative   THC UR  Positive*     Results from last 7 days   Lab Units 06/27/22  0230   ETHANOL LVL mg/dL <10       Past Medical History:   Diagnosis Date    Hypertension      Present on Admission:   Subdural hematoma (HCC)   GIB (gastrointestinal bleeding)   Acute blood loss anemia      Admitting Diagnosis: SDH  Age/Sex: 80 y o  male  Admission Orders:  SCD  PT/OT  I/O  Neuro checks  Stool record at bedside    Scheduled Medications:  atorvastatin, 40 mg, Oral, Daily  DULoxetine, 60 mg, Oral, Daily  gabapentin, 300 mg, Oral, Daily  iron sucrose, 200 mg, Intravenous, Once  levETIRAcetam, 500 mg, Oral, Q12H CARLO  metoprolol succinate, 100 mg, Oral, Daily  nicotine, 1 patch, Transdermal, Daily  pantoprazole, 40 mg, Oral, Early Morning  potassium chloride, 20 mEq, Oral, BID  potassium phosphate, 21 mmol, Intravenous, Once  pantoprazole (PROTONIX) injection 40 mg q12h dc'd 06/28    Continuous IV Infusions:  lactated ringers infusion  Rate: 125 mL/hr Dose: 125 mL/hr  Freq: Continuous Route: IV  Last Dose: Stopped (06/28/22 1017)  Start: 06/27/22 1815 End: 06/28/22 1011     PRN Meds:  acetaminophen, 975 mg, Oral, Q6H PRN 06/28 x 1  diphenhydrAMINE, 25 mg, Intravenous, Q6H PRN  HYDROmorphone, 0 2 mg, Intravenous, Q3H PRN  ondansetron, 4 mg, Intravenous, Q6H PRN 06/28 x 1  oxyCODONE, 2 5 mg, Oral, Q4H PRN  oxyCODONE, 5 mg, Oral, Q4H PRN 06/28 x 1        IP CONSULT TO NEUROSURGERY  IP CONSULT TO CASE MANAGEMENT  IP CONSULT TO GASTROENTEROLOGY  IP CONSULT TO GERONTOLOGY    Network Utilization Review Department  ATTENTION: Please call with any questions or concerns to [phone] and carefully listen to the prompts so that you are directed to the right person  All voicemails are confidential   Lynette Kussmaul all requests for admission clinical reviews, approved or denied determinations and any other requests to dedicated fax number below belonging to the campus where the patient is receiving treatment   List of dedicated fax numbers for the Facilities:  [address] DENIALS (Administrative/Medical Necessity) [phone]   [address] (Maternity/NICU/Pediatrics) [phone]   [address]  [address] [address] [place] [address] [address] [facility] [place] [address] [phone]

## 2022-06-28 NOTE — ASSESSMENT & PLAN NOTE
Evaluated by GI at [address] [place] [address], no indication for emergent EGD at this time as this does not appear active  Continue to monitor labs   Hgb 9 7 today

## 2022-06-28 NOTE — CONSULTS
Consultation - Geriatric Medicine   Faye Stafford 80 y o  male MRN: [id number]  Unit/Bed#: [facility] 603-01 Encounter: [id number]      Assessment/Plan     Ambulatory dysfunction with fall   -fall three days prior to initial presentation per initial ED documentation at [address]  -(presumed due to injuries but patient does not endorse/deny) head strike (unknown loss of consciousness  -injuries as outlined below  -Requires use of walker for ambulation at baseline  -no hx recurrent falls in past six months   -high risk future falls due to age, hx fall, residual deficits as late effect of CVA, deconditioning/debility, impaired vision, and unfamiliar environment   -encourage good body mechanics and assist with all transfers  -keep personal items and call bell close to prevent reaching  -maintain environment free of fall hazards  -encourage appropriate footwear and adequate lighting at all times when out of bed  -recommend home fall risk assessment and personal fall alert system if returning home  -PT and OT pending    Left subdural hematoma  -s/p fall as outlined above  -CTH on initial presentation reports left frontal and parietal area acute subdural hematoma with minimal mass effect and small hemorrhagic contusions in posterior right hemisphere with area previous encephalomalacia related to old infarct and diffuse chronic microangiopathic changes  -AC/AP on hold  -s/p Kcentra for reversal home Eliquis   -currently on Keppra for prophylaxis  -unclear if participation limited due to mental status change/early encephalopathy or intentional  -repeat CTH and labs this morning pending- hemodynamically stable at time of evaluation  -neuro checks per protocol  -Nsx on board     Right hemisphere hemorrhagic contusion  -plan as above    Hematemesis  -reportedly ongoing for one week prior to initial presentation   -Hb 9 6 from 10 6 on admission and baseline approximately 12-13  -morning hemoglobin level pending  -GI on board - keep NPO, cont IV Protonix and trend Hb, anticipate EGD timing TBD    Chronic systemic anticoagulation use  -maintained on Eliquis for cerebrovascular disease  -held and reversed on admission due to acute SDH and hematemesis    Acute pain due to trauma  -recommend pain control per Geriatric pain protocol with hold for sedation:  Tylenol 975mg Q8H scheduled  Roxicodone 2 5mg Q4H PRN moderate pain  Roxicodone 5mg Q4H PRN severe pain  Dilaudid 0 2mg Q4H PRN  -consider adjuncts such as lidocaine patch topically to appropriate musculoskeletal areas as needed  -encourage addition of non-pharmacologic pain treatment including ice and frequent repositioning  -recommend  bowel regimen to prevent and treat constipation due to increased risk with acute pain and opiate pain medications    Toxicology  -UA positive for amphetamines/methamphetamines and THC - patient denied use per admission documentation but unreliable historian at current time  -ED documentation on initial presentation at [address] [place] [address] reports pt intoxicated at time of arrival and illicit substance use cannot be entirely ruled out and cessation strongly advised if actively using  -pt does not appear to be on any prescription medications that would cause false positive but cannot entirely r/o as many OTC medications can also cause false positive results     Cognitive screening  -patient somnolent and non-participatory at time of evaluation   -no prior cognitive testing on record for review  -CTH on initial presentation personally reviewed, as well as acute abnormalities, extensive posterior right encephalomalacia appreciated  -TSH slightly elevated 6 53, B12 pending  -consider MOCA testing with OT later in current admission when clinically appropriate  -would benefit from comprehensive geriatric evaluation following recovery from acute injuries  -encourage use of sensory assist devices such as corrective lenses all appropriate times to reduce risk sensory movement contributing to isolation, confusion, encephalopathy more precipitous cognitive decline    Depression  -continue home Cymbalta regimen  -encourage referral to counseling/support group as part of multimodal comprehensive treatment approach  -close outpatient follow-up with PCP for ongoing management    Hx CXA  -residual left hemiplegia   -continue secondary risk factor modifications and close outpatient follow-up with appropriate providers    Nicotine dependence due to cigarettes  -personally provided bedside cessation counseling  -pt actively trying to cut down and now down to 1/2PPD from full pack per day  -nicotine patch during hosp  -close outpatient follow-up with PCP for ongoing treatment and support    Impaired Vision  -recommend use of corrective lenses at all appropriate times  -encourage adequate lighting and encourage use of assistance with ambulation  -keep personal belongings close to person to avoid reaching  -encourage appropriate footwear at all times  -consider large font for printed materials provided to patient    Impaired mastication  -Requires use of dentures-encourage use at all appropriate times  -ensure meal consistency appropriate for abilities  -continue aspiration precautions    Deconditioning/debility/frailty  -clinical frailty scale stage 5/6 mild to moderately frail  -multifactorial including age, history CVA with residual hemiplegia multiple additional chronic medical comorbidities now with acute intracerebral hemorrhage and other traumatic injuries in frail elderly individual with limited physiologic and metabolic reserve  -encourage well-balanced nutrition, consider nutritional supplementation between meals if oral intake is poor  -continue optimization chronic medical conditions and address acute derangements as arise   -ensure adequate treatment of any mood/anxiety/depression symptoms as may impact patient's response to therapies as well as overall sense of well-being and quality of life    Delirium precautions  -Patient is high risk of delirium due to age, fall, traumatic injuries, history CVA with limited physiologic and metabolic reserves, polypharmacy, hospitalization, multiple setting changes in short time (three hospitals within course of one day)  -Initiate delirium precautions  -maintain normal sleep/wake cycle  -minimize overnight interruptions, group overnight vitals/labs/nursing checks as possible  -dim lights, close blinds and turn off tv to minimize stimulation and encourage sleep environment in evenings  -ensure that pain is well controlled  -monitor for fecal and urinary retention which may precipitate delirium  -encourage early mobilization and ambulation with assistance once cleared by primary service to safely do so  -provide frequent reorientation and redirection as indicated and appropriate  -avoid medications which may precipitate or worsen delirium such as tramadol, benzodiazepine, anticholinergics, and benadryl whenever possible  -encourage hydration and nutrition   -redirect unwanted behaviors as first line    Home medication review    Will need to confirm with [facility] [phone] when open during business hours as not open at time of initial consultation      Care coordination:  Rounded with Yvrose Meredith (RN)    History of Present Illness   Physician Requesting Consult: Macarena Munoz MD  Reason for Consult / Principal Problem:  Fall  Hx and PE limited by: N/A  Additional history obtained from: Chart review and patient evaluation    HPI: Deandre Fulton is a 80y o  year old male with BPH with LUTS, history CVA, AAA, moderate episode recurrent major depressive disorder, hyperlipidemia, tachy-wilma syndrome, chronic bilateral low back pain without sciatica, chronic pain syndrome, urethral stricture, GERD, postop hemiplegia, hypertension, and history of agitation who is in to Trauma Service with ambulatory dysfunction fall found to have subdural hematoma on admission imaging, being seen in consultation by Geriatrics for high risk of developing delirium during hospitalization  Patient seen examined bedside where he is lying resting, he is very withdrawn and requires constant attempts to engage and obtain history  He endorses history of fall about one week ago which he states was at home and that he was able to get up on his own but he is unable to provide further details  He reports that he resides with roommates but cannot provide further detail regarding his living situation  He reports use of a cane for ambulation at baseline and denies history of recurrent falls, he requires use of glasses and dentures, denies use of hearing aids, he denies memory or cognitive concerns  He is a current half pack per day smoker and actively attempting to cut down and is amenable to nicotine patch during hospitalization  Inpatient consult to Gerontology  Consult performed by: Dimas Rodríguez DO  Consult ordered by: Uriel Warner DO        Review of Systems   Unable to perform ROS: Other (patient non-participatory for majority of HPI/ROS gathering despite repeated attempts )   HENT: Positive for dental problem (Wears dentures)  Eyes: Positive for visual disturbance ( wears glasses)  Gastrointestinal: Negative for abdominal pain  Musculoskeletal: Positive for gait problem ( uses cane)  Neurological: Positive for headaches       Historical Information   Past Medical History:   Diagnosis Date    Hypertension      Past Surgical History:   Procedure Laterality Date    HAND SURGERY       Social History   Social History     Substance and Sexual Activity   Alcohol Use Yes    Comment: occassional     Social History     Substance and Sexual Activity   Drug Use Never     Social History     Tobacco Use   Smoking Status Current Every Day Smoker    Packs/day: 0 50   Smokeless Tobacco Never Used     Family History:   Family History   Problem Relation Age of Onset    No Known Problems Mother    [facility] Hypertension Father      Meds/Allergies   all current active meds have been reviewed    Allergies   Allergen Reactions    Lisinopril Swelling     face swelling      Vicodin Hp [Hydrocodone-Acetaminophen] Abdominal Pain    Oxycodone Rash     No prescribed substances to be prescribed given failure of urine tox screen       Objective   No intake or output data in the 24 hours ending 06/28/22 0617  Invasive Devices  Report    Peripheral Intravenous Line  Duration           Peripheral IV 06/27/22 Dorsal (posterior); Right Hand 1 day    Peripheral IV 06/27/22 Distal;Dorsal (posterior); Left Forearm <1 day          Drain  Duration           External Urinary Catheter Medium <1 day              Physical Exam  Vitals and nursing note reviewed  Constitutional:       General: He is not in acute distress  Appearance: He is well-developed  Comments: Thin, frail, elderly male appears chronically ill, unshaven and unkempt   HENT:      Head: Normocephalic  Nose: Nose normal       Mouth/Throat:      Mouth: Mucous membranes are dry  Eyes:      General:         Right eye: No discharge  Left eye: No discharge  Neck:      Comments: Voice hoarse and difficulty to understand at times, trachea midline   Cardiovascular:      Rate and Rhythm: Bradycardia present  Pulmonary:      Effort: Pulmonary effort is normal  No respiratory distress  Breath sounds: No wheezing  Abdominal:      General: There is no distension  Palpations: Abdomen is soft  Tenderness: There is no abdominal tenderness  Musculoskeletal:      Right lower leg: No edema  Left lower leg: No edema  Comments: Diffuse severe subcutaneous fat and muscle wasting   Skin:     General: Skin is warm and dry        Comments: Facial ecchymoses    Neurological:      Comments: Sleeping, awakens to voice but difficult to engage and difficult to maintain wakeful state, answers ques intermittently but speech incomprehensible at times and does not follow commands    Psychiatric:         Behavior: Behavior is uncooperative  Comments: Flat and withdrawn, no agitation or aggression        Lab Results:   I have personally reviewed pertinent lab results      I have personally reviewed the following imaging study reports in PACS:    6/27/22-CT head without contrast, CT facial bones without contrast, CT C-spine without contrast, left shoulder XR, right shoulder XR    Therapies:   PT:  Pending  OT:  Pending    VTE Prophylaxis: RX contraindicated due to: Acute subdural hematoma    Code Status: Level 1 - Full Code  Advance Directive and Living Will:      Power of :    POLST:      Family and Social Support:   Living Arrangements: Lives w/ Friend  Support Systems: Home care staff  Assistance Needed: adl's, housekeeping, transfers, transportation  Type of Current Residence: Private residence  Current Home Care Services: Yes  Type of Current Home Care Services: Home health aide    Goals of Care: Get rid of headache

## 2022-06-28 NOTE — PLAN OF CARE
Problem: OCCUPATIONAL THERAPY ADULT  Goal: Performs self-care activities at highest level of function for planned discharge setting  See evaluation for individualized goals  Description: Treatment Interventions: ADL retraining, Visual perceptual retraining, Endurance training, Cognitive reorientation, Equipment evaluation/education, Compensatory technique education, Energy conservation, Activityengagement          See flowsheet documentation for full assessment, interventions and recommendations  6/28/2022 1725 by Sakina Stephen  Note: Limitation: Decreased ADL status, Decreased UE ROM, Decreased Safe judgement during ADL, Decreased cognition, Decreased endurance, Decreased self-care trans, Decreased high-level ADLs  Prognosis: Fair  Assessment:  Foot is a 79 yo (RHD) M transferred from [facility] to \[facility]"" with c/o of abdominal pain, and one-week history of coffee-ground emesis  Pt dx GIB  Per pt's sister, the aide recognized 3 days ago that pt may have fell resulting in L eye bruising  Imaging reveals pt sustained an acute SDH within L hemisphere, small hemorrhagic contusions, and area of encephalomalacia related to previous infarct  Pt's PMH includes: GIB, tobacco use, major depressive disorder, GERD, essential HTN, hemiplegia post-stroke, chronic pain syndrome, and history of substance abuse  Pt's prior level of function was I with functional mobility and toileting while requiring assistance with dressing, bathing and IADLs living in a 2 story house with three roommates  Pt receives assistance from caregiver daily for 8 hours  Currently, pt is supervision with eating, Min A with grooming, Mod A with UB ADLs, Max A with LB ADLs and toileting  Pt is Mod A x 2 with functional transfers/mobility with HHA   Pt's limitations include: pain, fatigue, vision impairment, impaired ROM of LUE, impaired functional grasp of L hand, decreased balance, decreased activity tolerance, decreased insight, decreased judgement, decreased safety awareness, decreased ability to complete ADL tasks, and lack of support  Pt was educated on compensatory techniques, importance of repositioning to increase activity tolerance, positioning for walt side (L), and increased participation with nursing staff to complete self-care tasks  The patient's raw score on the AM-PAC Daily Activity inpatient short form is 15, standardized score is 34 69, less than 39 4  Patients at this level are likely to benefit from discharge to post-acute rehabilitation services  Please refer to the recommendation of the Occupational Therapist for safe discharge planning  OT recommends pt receives additional skilled OT services at [facility] rehab  OT will continue to follow to goals listed below  OT Discharge Recommendation: Post acute rehabilitation services  OT - OK to Discharge: Yes    6/28/2022 1725 by Ioana Davis  Note: Limitation: Decreased ADL status, Decreased UE ROM, Decreased Safe judgement during ADL, Decreased cognition, Decreased endurance, Decreased self-care trans, Decreased high-level ADLs  Prognosis: Fair  Assessment: Virgilio Salvador is a 81 yo (RHD) M transferred from [facility] to [facility] with c/o of abdominal pain, and one-week history of coffee-ground emesis  Pt dx GIB  Per pt's sister, the aide recognized 3 days ago that pt may have fell resulting in L eye bruising  Imaging reveals pt sustained an acute SDH within L hemisphere, small hemorrhagic contusions, and area of encephalomalacia related to previous infarct  Pt's PMH includes: GIB, tobacco use, major depressive disorder, GERD, essential HTN, hemiplegia post-stroke, chronic pain syndrome, and history of substance abuse  Pt's prior level of function was I with functional mobility and toileting while requiring assistance with dressing, bathing and IADLs living in a 2 story house with three roommates  Pt receives assistance from caregiver daily for 8 hours   Currently, pt is supervision with eating, Min A with grooming, Mod A with UB ADLs, Max A with LB ADLs and toileting  Pt is Mod A x 2 with functional transfers/mobility with HHA  Pt's limitations include: pain, fatigue, vision impairment, impaired ROM of LUE, impaired functional grasp of L hand, decreased balance, decreased activity tolerance, decreased insight, decreased judgement, decreased safety awareness, decreased ability to complete ADL tasks, and lack of support  Pt was educated on compensatory techniques, importance of repositioning to increase activity tolerance, positioning for walt side (L), and increased participation with nursing staff to complete self-care tasks  The patient's raw score on the AM-PAC Daily Activity inpatient short form is 15, standardized score is 34 69, less than 39 4  Patients at this level are likely to benefit from discharge to post-acute rehabilitation services  Please refer to the recommendation of the Occupational Therapist for safe discharge planning  OT recommends pt receives additional skilled OT services at [facility] rehab  OT will continue to follow to goals listed below  OT Discharge Recommendation: Post acute rehabilitation services  OT - OK to Discharge:  Yes

## 2022-06-28 NOTE — CONSULTS
[address] 1950, 80 y o  male MRN: [id number]  Unit/Bed#: [facility] 603-01 Encounter: [id number]  Primary Care Provider: Amina Chinchilla DO   Date and time admitted to hospital: 6/27/2022  5:41 PM    Inpatient consult to Neurosurgery  Consult performed by: Caren Campos PA-C  Consult ordered by: Elmer Mahmood DO      Imaging personally reviewed with attending 6/28/22 at 7:30am  Patient examined at bedside 6/28/22 at 9:30am    * Subdural hematoma [facility])  Assessment & Plan  Left acute SDH, right hemorrhagic contusions within old right posterior stroke infarct 2/2 fall 4 days ago  · On Eliquis for h/o stroke, reversed with [facility]  · Baseline left hemiplegia 2/2 stroke, otherwise no new neuro deficits on exam    Imaging personally reviewed with attending:  · CT head w/o, 6/27/22: Acute subdural hematoma within the left hemisphere superficial to the frontal and parietal lobes with only minimal mass effect upon the adjacent brain parenchyma  Small hemorrhagic contusions are seen within the posterior aspect of the right hemisphere, within an area of encephalomalacia related to previous old infarct  No resulting mass effect  · CT head w/o, 6/28/22: Stable CT appearance of the brain with acute left frontal parietal subdural resulting in only minimal mass effect  Several areas of contusion in the infarcted parenchyma within the posterior right hemisphere are stable  Plan:  · Continue frequent neurological checks  · STAT CT head with any neurological decline including drop GCS of 2pts within 1 hr   · Recommend SBP <160mmHg  · Keppra per primary team  · Hold all antiplatelet and anticoagulation medications  · Pain control per primary team  · Mobilize with PT/OT  · DVT PPX: SCDs, ok for pharm ppx  · Ongoing medical management per primary team/trauma  · No neurosurgical intervention indicated at this juncture  Neurosurgery will sign off  Patient will follow up in 2 weeks with repeat CT head  Continue to hold Eliquis  Please call with questions or concerns  Hemiplegia, post-stroke [facility])  Assessment & Plan  Baseline left hemiplegia    GIB (gastrointestinal bleeding)  Assessment & Plan  Evaluated by GI at [address] [place] [address], no indication for emergent EGD at this time as this does not appear active  Continue to monitor labs  Hgb 9 7 today      History of Present Illness     HPI: James Barrera is a 80y o  year old male with PMH including HTN, meth use, SSS, CVA on Eliquis, GERD, AAA, HLD, chronic pain, depression, anxiety who presents as a trauma transfer from [address] [place] [address] for acute left SDH  He initially presented to [facility] with coffee ground emesis and was subsequently transferred to [address] [place] [address] for further evaluation  He was not acting appropriately so medicine attending ordered a CT head, which showed an acute left sided SDH and right sided hemorrhagic contusions within an old stroke territory  He does take Eliquis daily, likely for h/o stroke but patient does not know why he takes it  He is s/p [address]  He does admit to a fall several days ago but can't remember what happened  Neurologically he remains at baseline with left sided hemiplegia but no other neurological deficits; he is A&O x3 and GCS 15  Review of Systems   Constitutional: Negative for chills and fever  HENT: Negative for ear pain and sore throat  Eyes: Negative for pain and visual disturbance  Respiratory: Negative for cough and shortness of breath  Cardiovascular: Negative for chest pain and palpitations  Gastrointestinal: Negative for abdominal pain and vomiting  Genitourinary: Negative for dysuria and hematuria  Musculoskeletal: Negative for arthralgias and back pain  Skin: Negative for color change and rash  Neurological: Positive for weakness  Negative for seizures, syncope, numbness and headaches  All other systems reviewed and are negative        Historical Information Past Medical History:   Diagnosis Date    Hypertension      Past Surgical History:   Procedure Laterality Date    HAND SURGERY       Social History     Substance and Sexual Activity   Alcohol Use Yes    Comment: occassional     Social History     Substance and Sexual Activity   Drug Use Never     Social History     Tobacco Use   Smoking Status Current Every Day Smoker    Packs/day: 0 50   Smokeless Tobacco Never Used     Family History   Problem Relation Age of Onset    No Known Problems Mother     Hypertension Father        Meds/Allergies   all current active meds have been reviewed, current meds:   Current Facility-Administered Medications   Medication Dose Route Frequency    acetaminophen (TYLENOL) tablet 975 mg  975 mg Oral Q6H PRN    atorvastatin (LIPITOR) tablet 40 mg  40 mg Oral Daily    diphenhydrAMINE (BENADRYL) injection 25 mg  25 mg Intravenous Q6H PRN    DULoxetine (CYMBALTA) delayed release capsule 60 mg  60 mg Oral Daily    gabapentin (NEURONTIN) capsule 300 mg  300 mg Oral Daily    HYDROmorphone HCl (DILAUDID) injection 0 2 mg  0 2 mg Intravenous Q3H PRN    lactated ringers infusion  125 mL/hr Intravenous Continuous    levETIRAcetam (KEPPRA) tablet 500 mg  500 mg Oral Q12H [address]    metoprolol succinate (TOPROL-XL) 24 hr tablet 100 mg  100 mg Oral Daily    nicotine (NICODERM CQ) 14 mg/24hr TD 24 hr patch 1 patch  1 patch Transdermal Daily    ondansetron (ZOFRAN) injection 4 mg  4 mg Intravenous Q6H PRN    oxyCODONE (ROXICODONE) IR tablet 2 5 mg  2 5 mg Oral Q4H PRN    oxyCODONE (ROXICODONE) IR tablet 5 mg  5 mg Oral Q4H PRN    pantoprazole (PROTONIX) EC tablet 40 mg  40 mg Oral Early Morning    potassium chloride (K-DUR,KLOR-CON) CR tablet 20 mEq  20 mEq Oral BID    potassium phosphates 21 mmol in sodium chloride 0 9 % 250 mL infusion  21 mmol Intravenous Once    and PTA meds:   Prior to Admission Medications   Prescriptions Last Dose Informant Patient Reported? Taking? DULoxetine (CYMBALTA) 60 mg delayed release capsule  Pharmacy (Specify) Yes No   Sig: Take 60 mg by mouth daily   apixaban (Eliquis) 5 mg  Pharmacy (Specify) Yes No   Sig: Take 5 mg by mouth 2 (two) times a day   atorvastatin (LIPITOR) 40 mg tablet   Yes No   Sig: Take 40 mg by mouth daily   Patient not taking: Reported on 6/27/2022   cholecalciferol (VITAMIN D3) 1,000 units tablet  Pharmacy (Specify) Yes No   Sig: Take 50,000 Units by mouth once a week   folic acid (FOLVITE) 1 mg tablet  Pharmacy (Specify) Yes No   Sig: Take 1 mg by mouth daily   gabapentin (NEURONTIN) 600 MG tablet  Pharmacy (Specify) No No   Sig: Take 1 tablet (600 mg total) by mouth 3 (three) times a day   Patient taking differently: Take 300 mg by mouth daily   meloxicam (MOBIC) 15 mg tablet  Pharmacy (Specify) Yes No   Sig: Take 15 mg by mouth daily   metoprolol succinate (TOPROL-XL) 100 mg 24 hr tablet  Pharmacy (Specify) Yes No   Sig: Take 100 mg by mouth daily   omeprazole (PriLOSEC) 40 MG capsule  Pharmacy (Specify) Yes No   Sig: Take 40 mg by mouth daily   terbinafine (LamISIL) 250 mg tablet  Pharmacy (Specify) Yes No   Sig: Take 250 mg by mouth daily For 12 weeks for toenail fungus, picked up on 5/12/22      Facility-Administered Medications: None     Allergies   Allergen Reactions    Lisinopril Swelling     face swelling      Vicodin Hp [Hydrocodone-Acetaminophen] Abdominal Pain    Oxycodone Rash     No prescribed substances to be prescribed given failure of urine tox screen         Objective   I/O       06/26 0701 06/27 0700 06/27 0701 06/28 0700 06/28 0701 06/29 0700    P  O    0    Total Intake(mL/kg)   0 (0)    Net   0                 Physical Exam  Vitals and nursing note reviewed  Constitutional:       Appearance: He is well-developed  Comments: dissheveled   HENT:      Head: Normocephalic and atraumatic  Mouth/Throat:      Comments: Poor dentition  Eyes:      Extraocular Movements: Extraocular movements intact  Pupils: Pupils are equal, round, and reactive to light  Cardiovascular:      Rate and Rhythm: Normal rate  Pulmonary:      Effort: Pulmonary effort is normal  No respiratory distress  Abdominal:      Palpations: Abdomen is soft  Musculoskeletal:         General: Normal range of motion  Cervical back: Normal range of motion  Skin:     General: Skin is warm and dry  Neurological:      Mental Status: He is alert and oriented to person, place, and time  Coordination: Finger-nose-finger test: cannot assess left side  Deep Tendon Reflexes:      Reflex Scores:       Brachioradialis reflexes are 2+ on the right side and 2+ on the left side  Patellar reflexes are 2+ on the right side and 2+ on the left side  Psychiatric:         Mood and Affect: Mood normal          Speech: Speech normal          Behavior: Behavior normal          Thought Content: Thought content normal          Judgment: Judgment normal        Neurologic Exam     Mental Status   Oriented to person, place, and time  Follows 2 step commands  Attention: normal  Concentration: normal    Speech: speech is normal   Level of consciousness: alert  Knowledge: good  Able to perform simple calculations  Able to repeat  Normal comprehension  Cranial Nerves   Cranial nerves II through XII intact  CN III, IV, VI   Pupils are equal, round, and reactive to light  Motor Exam   Muscle bulk: normal  Overall muscle tone: increased (left side)  Right arm pronator drift: absent  Left arm pronator drift: cannot assess  Strength   Strength 5/5 except as noted  Left delt 2/5, left bi/tri 3/5 , left WE/WF 0/5 with wrist drop, left  2/5  4-/5 LLE  5/5 right side     Sensory Exam   Light touch normal      Gait, Coordination, and Reflexes     Coordination   Finger-nose-finger test: cannot assess left side      Tremor   Resting tremor: absent    Reflexes   Right brachioradialis: 2+  Left brachioradialis: 2+  Right patellar: 2+  Left patellar: 2+  Right Eagle: absent  Left Eagle: absent  Right ankle clonus: absent  Left ankle clonus: absent        Vitals:Blood pressure 125/62, pulse (!) 49, temperature (!) 97 1 °F (36 2 °C), resp  rate 17, height 5' 10" (1 778 m), weight 60 5 kg (133 lb 6 1 oz), SpO2 95 %  ,Body mass index is 19 14 kg/m²  Lab Results:   Results from last 7 days   Lab Units 06/28/22  0604 06/27/22  2359 06/27/22  1856 06/27/22  1620 06/27/22  0706   WBC Thousand/uL 6 41  --  7 86  --  8 56   HEMOGLOBIN g/dL 9 7* 9 6* 9 9*   < > 9 8*   HEMATOCRIT % 31 5*  --  31 1*  --  30 4*   PLATELETS Thousands/uL 189  --  203  --  216   NEUTROS PCT % 54  --  61  --  69   MONOS PCT % 9  --  9  --  9    < > = values in this interval not displayed  Results from last 7 days   Lab Units 06/28/22  0604 06/27/22  1856 06/27/22  0230   POTASSIUM mmol/L 3 3* 3 6 3 5   CHLORIDE mmol/L 107 107 97   CO2 mmol/L 31 28 28   BUN mg/dL 15 15 23   CREATININE mg/dL 0 66 0 71 0 75   CALCIUM mg/dL 8 2* 8 3 9 2   ALK PHOS U/L  --   --  75   ALT U/L  --   --  11   AST U/L  --   --  21     Results from last 7 days   Lab Units 06/28/22  0604 06/27/22  0230   MAGNESIUM mg/dL 2 1 1 9     Results from last 7 days   Lab Units 06/28/22  0604   PHOSPHORUS mg/dL 2 2*     Results from last 7 days   Lab Units 06/27/22  1302   INR  1 06   PTT seconds 27     No results found for: TROPONINT  ABG:No results found for: PHART, GXD9XPB, PO2ART, ZWL2SUM, D9RSATFF, BEART, SOURCE    Imaging Studies: I have personally reviewed pertinent reports  and I have personally reviewed pertinent films in PACS     XR chest 1 view portable    Result Date: 6/27/2022  Narrative: CHEST INDICATION:   pain  COMPARISON:  7/17/2017 EXAM PERFORMED/VIEWS:  XR CHEST PORTABLE  3:15 AM FINDINGS: Heart is normal in size  Tortuous thoracic aorta again noted  There may be aneurysmal dilatation of the ascending aorta  Lungs are mildly hyperinflated but appear clear    No pneumothorax or effusion is appreciated  Osseous structures appear within normal limits for patient age  Impression: 1  No acute cardiopulmonary disease noted  2   Tortuous thoracic aorta with possible mild aneurysmal dilatation of descending aorta  Workstation performed: [id number]     XR shoulder 2+ vw left    Result Date: 6/27/2022  Narrative: LEFT SHOULDER INDICATION:  Left shoulder pain, trauma  COMPARISON:  6/21/2019 VIEWS:  XR SHOULDER 2+ VW LEFT Images: 3 FINDINGS: There is no acute fracture or dislocation  Mild osteoarthritis acromioclavicular joint  No lytic or blastic osseous lesion  Soft tissues are unremarkable  The visualized left hemithorax appears intact  Degenerative changes visualized gastric spine  Impression: No acute osseous abnormality  Degenerative changes as described  Workstation performed: [id number]     XR shoulder 2+ vw right    Result Date: 6/27/2022  Narrative: RIGHT SHOULDER INDICATION: Right shoulder pain, trauma  COMPARISON:  6/21/2019 VIEWS:  XR SHOULDER 2+ VW RIGHT Images: 3 FINDINGS: There is no acute fracture or dislocation  Minimal, chronic elevation of the right clavicle in relation to the acromion  Patient had grade 3 right AC joint separation on remote imaging  No significant degenerative changes  No lytic or blastic osseous lesion  Soft tissues are unremarkable  The visualized right hemithorax appears intact  Impression: No acute osseous abnormality  Workstation performed: [id number]     CT head wo contrast    Result Date: 6/28/2022  Narrative: CT BRAIN - WITHOUT CONTRAST INDICATION:   Acute SDH  COMPARISON:  CT 6/27/2022 TECHNIQUE:  CT examination of the brain was performed  In addition to axial images, sagittal and coronal 2D reformatted images were created and submitted for interpretation  Radiation dose length product (DLP) for this visit:  923 06 mGy-cm     This examination, like all CT scans performed in the [place], was performed utilizing techniques to minimize radiation dose exposure, including the use of iterative  reconstruction and automated exposure control  IMAGE QUALITY:  Diagnostic  FINDINGS: PARENCHYMA:  No substantive change in acute subdural hematoma extending over the frontal parietal region on the left  Maximum width of approximately 13 to 14 mm in the posterior parietal region  Local mass effect, with very minimal left-to-right shift  Remote right parieto-occipital infarct is stable, where scattered areas encephalomalacia and gliosis are stable  Scattered High density likely representing contusion are noted, stable in appearance, with no evidence for any significant mass effect  Some extension of the blood along the posterior body of the right lateral ventricle is stable  VENTRICLES AND EXTRA-AXIAL SPACES:  Focal ex vacuo hydrocephalus  VISUALIZED ORBITS AND PARANASAL SINUSES:  Progressive opacification of the right maxillary and anterior right ethmoid sinus CALVARIUM AND EXTRACRANIAL SOFT TISSUES:  Normal      Impression: Stable CT appearance of the brain with acute left frontal parietal subdural resulting in only minimal mass effect  Several areas of contusion in the infarcted parenchyma within the posterior right hemisphere are stable  Workstation performed: [id number]     CT head wo contrast    Result Date: 6/27/2022  Narrative: CT BRAIN - WITHOUT CONTRAST INDICATION:   trauma/fall  COMPARISON:  None  TECHNIQUE:  CT examination of the brain was performed  In addition to axial images, sagittal and coronal 2D reformatted images were created and submitted for interpretation  Radiation dose length product (DLP) for this visit:  951 mGy-cm   This examination, like all CT scans performed in the [facility], was performed utilizing techniques to minimize radiation dose exposure, including the use of iterative reconstruction and automated exposure control  IMAGE QUALITY:  Diagnostic   FINDINGS: PARENCHYMA:  There is an acute subdural hematoma within the left hemisphere, mainly peripheral to the frontal and parietal lobes  At its largest point, this measures approximately 1 4 cm from the inner table of the calvarium on coronal imaging, see image 73 of coronal imaging  There is only minimal mass effect upon the adjacent brain parenchyma with no subfalcine or transtentorial herniation  Small acute hemorrhagic contusions are seen within the area of encephalomalacia within the posterior right hemisphere including the right occipital lobe and within the right frontal parietal white matter adjacent to the posterior body and atria of the lateral ventricle  Because of the surrounding encephalomalacia, these do not result in mass effect upon the adjacent brain  Additional scattered decreased attenuation consistent with chronic microangiopathic change  Mild parenchymal volume loss  VENTRICLES:  No ventriculomegaly  VISUALIZED ORBITS AND PARANASAL SINUSES:  Unremarkable  CALVARIUM AND EXTRACRANIAL SOFT TISSUES:  Normal      Impression: Acute subdural hematoma within the left hemisphere superficial to the frontal and parietal lobes with only minimal mass effect upon the adjacent brain parenchyma  Small hemorrhagic contusions are seen within the posterior aspect of the right hemisphere, within an area of encephalomalacia related to previous old infarct  No resulting mass effect  Additional chronic microangiopathic change within the brain parenchyma  I personally discussed this study with VIKKI ALBARADO on 6/27/2022 at 2:10 PM  Workstation performed: [id number]     CT facial bones wo contrast    Result Date: 6/27/2022  Narrative: CT FACIAL BONES WITHOUT INTRAVENOUS CONTRAST INDICATION:   trauma  COMPARISON: None  TECHNIQUE:  Axial CT images were obtained through the facial bones with additional sagittal and coronal reconstructions  Radiation dose length product (DLP) for this visit:  335 mGy-cm     This examination, like all CT scans performed in the [facility], was performed utilizing techniques to minimize radiation dose exposure, including the use of iterative reconstruction and automated exposure control  IMAGE QUALITY:  Diagnostic  FINDINGS: FACIAL BONES:   No facial bone fracture identified  Normal alignment of the temporomandibular joints  No lytic or blastic lesion  ORBITS:  Orbital globes, optic nerves, and extraocular muscles appear symmetric and normal  There is no evidence of retrobulbar mass, abscess, or hematoma  SINUSES:  Mild mucosal thickening of the right maxillary sinus  SOFT TISSUES:  Mild soft tissue thickening is seen superficial to the left frontal bone and orbit suggesting soft tissue contusion  No hematoma identified  There is an acute subdural hematoma within the left hemisphere  See separate CT brain  Impression: No acute osseous abnormality  Subtle left facial soft tissue contusion superficial to the frontal bone and orbit  There is  an acute subdural hematoma within the left hemisphere  See separate CT brain report  Workstation performed: [id number]     CT spine cervical wo contrast    Result Date: 6/27/2022  Narrative: CT CERVICAL SPINE - WITHOUT CONTRAST INDICATION:   Neck trauma (Age >= 65y) trauma  COMPARISON:  None  TECHNIQUE:  CT examination of the cervical spine was performed without intravenous contrast   Contiguous axial images were obtained  Sagittal and coronal reconstructions were performed  Radiation dose length product (DLP) for this visit:  507 mGy-cm   This examination, like all CT scans performed in the [facility], was performed utilizing techniques to minimize radiation dose exposure, including the use of iterative reconstruction and automated exposure control  IMAGE QUALITY:  Diagnostic  FINDINGS: ALIGNMENT:  Normal alignment of the cervical spine  No subluxation  VERTEBRAL BODIES:  No fracture   DEGENERATIVE CHANGES:  Multilevel cervical degenerative change  There is slightly more pronounced foraminal narrowing at the C5-6 level and C6-7 levels, moderate due to endplate and uncinate joint hypertrophic osteophyte formation  There is moderate canal stenosis from C4-5 through C6-7  PREVERTEBRAL AND PARASPINAL SOFT TISSUES:  Vascular calcifications of the common carotid artery bifurcations bilaterally  THORACIC INLET:  Mild emphysema within the lung apices  Impression: Cervical degenerative change with moderate canal stenosis and foraminal narrowing  No acute osseous abnormality  Workstation performed: [id number]       EKG, Pathology, and Other Studies: I have personally reviewed pertinent reports  VTE Prophylaxis: Sequential compression device Jigar Crissy)     Code Status: Level 1 - Full Code  Advance Directive and Living Will:      Power of :    POLST:      Counseling / Coordination of Care  I spent 30 minutes with the patient

## 2022-06-28 NOTE — CASE MANAGEMENT
Case Management Assessment & Discharge Planning Note    Patient name Clide Guess  Location [address]/PPHP 161-86 MRN [id number]  : 1950 Date 2022       Current Admission Date: 2022  Current Admission Diagnosis:Subdural hematoma [facility])   Patient Active Problem List    Diagnosis Date Noted    Acute blood loss anemia 2022    GIB (gastrointestinal bleeding) 2022    Hemiplegia, post-stroke ([facility] [address] ) 2022    Essential hypertension 2022    GERD (gastroesophageal reflux disease) 2022    Major depressive disorder 2022    Tobacco use 2022    Subdural hematoma ([facility] [address] ) 2022    Chronic bilateral low back pain without sciatica 2020    Cervical spinal stenosis 2019    Cervical spondylosis 2019    Cervical radiculopathy 2019    Neck pain 2019    Cervical disc disorder with radiculopathy of mid-cervical region 2019    Long-term current use of opiate analgesic     Uncomplicated opioid dependence ([facility] ) 2019    Chronic pain syndrome 2019    Lumbar spondylosis 2019      LOS (days): 1  Geometric Mean LOS (GMLOS) (days):   Days to GMLOS:     OBJECTIVE:  PATIENT READMITTED TO HOSPITAL  Risk of Unplanned Readmission Score: 13 67         Current admission status: Inpatient       [facility]   [facility] [address]  [address] [facility] [address] [facility] [address]  Phone: [phone] Fax: [phone]    Primary Care Provider: Sae Frank DO    Primary Insurance: [facility]  Secondary Insurance: [address]:  Enid Ochoa Proxies    There are no active Health Care Proxies on file         Advance Directives  Does patient have a [address]?: No  Was patient offered paperwork?: Yes  Does patient currently have a Health Care decision maker?: Yes, please see Health Care Proxy section  Does patient have Advance Directives?: No  Was patient offered paperwork?: Yes  Primary Contact: Cullen Olivarez (Sister) [phone]         Readmission Root Cause  30 Day Readmission: No    Patient Information  Admitted from[de-identified] Home  Mental Status: Alert  During Assessment patient was accompanied by: Not accompanied during assessment  Assessment information provided by[de-identified] Patient  Primary Caregiver: Self  Support Systems: Self, Home care staff  [place] of Residence: Other (specify in comment box) ([place] [address])  What city do you live in?: [facility] entry access options   Select all that apply : No steps to enter home  Type of Current Residence: 2 story home  Upon entering residence, is there a bedroom on the main floor (no further steps)?: No  A bedroom is located on the following floor levels of residence (select all that apply):: 2nd Floor  Upon entering residence, is there a bathroom on the main floor (no further steps)?: Yes  Number of steps to 2nd floor from main floor: One Flight  In the last 12 months, was there a time when you were not able to pay the mortgage or rent on time?: No  In the last 12 months, how many places have you lived?: 1  In the last 12 months, was there a time when you did not have a steady place to sleep or slept in a shelter (including now)?: No  Homeless/housing insecurity resource given?: N/A  Living Arrangements: Lives w/ Friend  Is patient a ?: No    Activities of Daily Living Prior to Admission  Functional Status: Independent  Completes ADLs independently?: Yes  Ambulates independently?: Yes  Does patient use assisted devices?: Yes  Assisted Devices (DME) used: [employer]ico Pendleton  Does patient currently own DME?: Yes  What DME does the patient currently own?: Ticojuanita Wesleynarda  Does patient have a history of Outpatient Therapy (PT/OT)?: Yes  Does the patient have a history of Short-Term Rehab?: Yes  Does patient have a history of HHC?: No  Does patient currently have [facility]?: No    Current Home Health Care  Type of Current Home Care Services: Home health aide    Patient Information Continued  Income Source: SSI/SSD  Does patient have prescription coverage?: Yes  Within the past 12 months, you worried that your food would run out before you got the money to buy more : Never true  Within the past 12 months, the food you bought just didn't last and you didn't have money to get more : Never true  Food insecurity resource given?: N/A  Does patient receive dialysis treatments?: No  Does patient have a history of substance abuse?: No  Does patient have a history of Mental Health Diagnosis?: No  Is patient receiving treatment for mental health?: No  Patient declined treatment information    Has patient received inpatient treatment related to mental health in the last 2 years?: No    Means of Transportation  Means of Transport to Appts[de-identified] Family transport  In the past 12 months, has lack of transportation kept you from medical appointments or from getting medications?: No  In the past 12 months, has lack of transportation kept you from meetings, work, or from getting things needed for daily living?: No  Was application for public transport provided?: N/A    DISCHARGE DETAILS:    Discharge planning discussed with[de-identified] patient  Freedom of Choice: Yes     CM contacted family/caregiver?: Yes  Were Treatment Team discharge recommendations reviewed with patient/caregiver?: Yes  Did patient/caregiver verbalize understanding of patient care needs?: Yes  Were patient/caregiver advised of the risks associated with not following Treatment Team discharge recommendations?: Yes    Contacts  Patient Contacts: Vinay López (Sister) [phone]  Relationship to Patient[de-identified] Family  Contact Method: Phone  Phone Number: [phone]  Reason/Outcome: Continuity of Care, Emergency [address]         Is the patient interested in [place] [address] at discharge?: No    DME Referral Provided  Referral made for DME?: No    Treatment Team Recommendation: SNF  Discharge Destination Plan[de-identified] SNF    CM met with pt to discuss d/c plan  Pt was recommended for IP rehab as per OT/PT  Pt was in agreement with this decision and was amenable to CM placing a blanket referral in his area  CM placed and will follow up    CM reviewed d/c planning process including the following: identifying help at home, patient preference for d/c planning needs, Discharge Lounge, [facility] Meds to Bed program, availability of treatment team to discuss questions or concerns patient and/or family may have regarding understanding medications and recognizing signs and symptoms once discharged  CM also encouraged patient to follow up with all recommended appointments after discharge  Patient advised of importance for patient and family to participate in managing patients medical well being

## 2022-06-28 NOTE — UTILIZATION REVIEW
Inpatient Admission Authorization Request   NOTIFICATION OF INPATIENT ADMISSION/INPATIENT AUTHORIZATION REQUEST   SERVICING FACILITY:   [facility]  Address: [address] [facility], [address] [place] [address]  Tax ID: [id number]  NPI: [id number]  Place of Service: Inpatient [address] [facility] Code: 24     ATTENDING PROVIDER:  Attending Name and NPI#: Ashley Cedeño [[id number]]  Address: [address] [place] [address], [address] [place] [address]  Phone: [phone]     UTILIZATION REVIEW CONTACT:  Alysha Abreu Utilization   Network Utilization Review Department  Phone: [phone]  Fax: [phone]  Email: [email or web address]     PHYSICIAN ADVISORY SERVICES:  FOR USTY-QE-FXPP REVIEW - MEDICAL NECESSITY DENIAL  Phone: [phone]  Fax: [phone]  Email: [email or web address]     TYPE OF REQUEST:  Inpatient Status     ADMISSION INFORMATION:  ADMISSION DATE/TIME: 6/27/22  5:41 PM  PATIENT DIAGNOSIS CODE/DESCRIPTION:  SDH  DISCHARGE DATE/TIME: No discharge date for patient encounter  IMPORTANT INFORMATION:  Please contact Alysha Abreu directly with any questions or concerns regarding this request  Department voicemails are confidential     Send requests for admission clinical reviews, concurrent reviews, approvals, and administrative denials due to lack of clinical to fax [phone]

## 2022-06-28 NOTE — ASSESSMENT & PLAN NOTE
2/2 suspected upper GI bleed  Hgb stable 9 7 last pm (last 24h 9 7/9 6/9 9/9 2)  Currently holding DVT prophylaxis for EGD to be performed today per GI recommendations  Will continue to trend H&H  Transfuse as necessary

## 2022-06-28 NOTE — PLAN OF CARE
Problem: Prexisting or High Potential for Compromised Skin Integrity  Goal: Skin integrity is maintained or improved  Description: INTERVENTIONS:  - Identify patients at risk for skin breakdown  - Assess and monitor skin integrity  - Assess and monitor nutrition and hydration status  - Monitor labs   - Assess for incontinence   - Turn and reposition patient  - Assist with mobility/ambulation  - Relieve pressure over bony prominences  - Avoid friction and shearing  - Provide appropriate hygiene as needed including keeping skin clean and dry  - Evaluate need for skin moisturizer/barrier cream  - Collaborate with interdisciplinary team   - Patient/family teaching  - Consider wound care consult   Outcome: Progressing     Problem: MOBILITY - ADULT  Goal: Maintain or return to baseline ADL function  Description: INTERVENTIONS:  -  Assess patient's ability to carry out ADLs; assess patient's baseline for ADL function and identify physical deficits which impact ability to perform ADLs (bathing, care of mouth/teeth, toileting, grooming, dressing, etc )  - Assess/evaluate cause of self-care deficits   - Assess range of motion  - Assess patient's mobility; develop plan if impaired  - Assess patient's need for assistive devices and provide as appropriate  - Encourage maximum independence but intervene and supervise when necessary  - Involve family in performance of ADLs  - Assess for home care needs following discharge   - Consider OT consult to assist with ADL evaluation and planning for discharge  - Provide patient education as appropriate  Outcome: Progressing  Goal: Maintains/Returns to pre admission functional level  Description: INTERVENTIONS:  - Perform BMAT or MOVE assessment daily    - Set and communicate daily mobility goal to care team and patient/family/caregiver  - Collaborate with rehabilitation services on mobility goals if consulted  - Perform Range of Motion    times a day    - Reposition patient every hours   - Dangle patient    times a day  - Stand patient    times a day  - Ambulate patient    times a day  - Out of bed to chair    times a day   - Out of bed for meals    times a day  - Out of bed for toileting  - Record patient progress and toleration of activity level   Outcome: Progressing     Problem: Potential for Falls  Goal: Patient will remain free of falls  Description: INTERVENTIONS:  - Educate patient/family on patient safety including physical limitations  - Instruct patient to call for assistance with activity   - Consult OT/PT to assist with strengthening/mobility   - Keep Call bell within reach  - Keep bed low and locked with side rails adjusted as appropriate  - Keep care items and personal belongings within reach  - Initiate and maintain comfort rounds  - Make Fall Risk Sign visible to staff  - Offer Toileting every    Hours, in advance of need  - Initiate/Maintain   alarm  - Obtain necessary fall risk management equipment:   - Apply yellow socks and bracelet for high fall risk patients  - Consider moving patient to room near nurses station  Outcome: Progressing     Problem: Nutrition/Hydration-ADULT  Goal: Nutrient/Hydration intake appropriate for improving, restoring or maintaining nutritional needs  Description: Monitor and assess patient's nutrition/hydration status for malnutrition  Collaborate with interdisciplinary team and initiate plan and interventions as ordered  Monitor patient's weight and dietary intake as ordered or per policy  Utilize nutrition screening tool and intervene as necessary  Determine patient's food preferences and provide high-protein, high-caloric foods as appropriate       INTERVENTIONS:  - Monitor oral intake, urinary output, labs, and treatment plans  - Assess nutrition and hydration status and recommend course of action  - Evaluate amount of meals eaten  - Assist patient with eating if necessary   - Allow adequate time for meals  - Recommend/ encourage appropriate diets, oral nutritional supplements, and vitamin/mineral supplements  - Order, calculate, and assess calorie counts as needed  - Recommend, monitor, and adjust tube feedings and TPN/PPN based on assessed needs  - Assess need for intravenous fluids  - Provide specific nutrition/hydration education as appropriate  - Include patient/family/caregiver in decisions related to nutrition  Outcome: Progressing

## 2022-06-29 ENCOUNTER — ANESTHESIA (OUTPATIENT)
Dept: ANESTHESIOLOGY | Facility: HOSPITAL | Age: 72
End: 2022-06-29

## 2022-06-29 ENCOUNTER — ANESTHESIA EVENT (OUTPATIENT)
Dept: ANESTHESIOLOGY | Facility: HOSPITAL | Age: 72
End: 2022-06-29

## 2022-06-29 LAB
ANION GAP SERPL CALCULATED.3IONS-SCNC: 4 MMOL/L (ref 4–13)
BASOPHILS # BLD AUTO: 0.04 THOUSANDS/ΜL (ref 0–0.1)
BASOPHILS NFR BLD AUTO: 1 % (ref 0–1)
BUN SERPL-MCNC: 11 MG/DL (ref 5–25)
CALCIUM SERPL-MCNC: 8.3 MG/DL (ref 8.3–10.1)
CHLORIDE SERPL-SCNC: 103 MMOL/L (ref 100–108)
CO2 SERPL-SCNC: 30 MMOL/L (ref 21–32)
CREAT SERPL-MCNC: 0.61 MG/DL (ref 0.6–1.3)
EOSINOPHIL # BLD AUTO: 0.13 THOUSAND/ΜL (ref 0–0.61)
EOSINOPHIL NFR BLD AUTO: 2 % (ref 0–6)
ERYTHROCYTE [DISTWIDTH] IN BLOOD BY AUTOMATED COUNT: 14.4 % (ref 11.6–15.1)
GFR SERPL CREATININE-BSD FRML MDRD: 101 ML/MIN/1.73SQ M
GLUCOSE SERPL-MCNC: 101 MG/DL (ref 65–140)
HCT VFR BLD AUTO: 32.6 % (ref 36.5–49.3)
HGB BLD-MCNC: 10.3 G/DL (ref 12–17)
IMM GRANULOCYTES # BLD AUTO: 0.03 THOUSAND/UL (ref 0–0.2)
IMM GRANULOCYTES NFR BLD AUTO: 0 % (ref 0–2)
LYMPHOCYTES # BLD AUTO: 1.11 THOUSANDS/ΜL (ref 0.6–4.47)
LYMPHOCYTES NFR BLD AUTO: 15 % (ref 14–44)
MCH RBC QN AUTO: 31.1 PG (ref 26.8–34.3)
MCHC RBC AUTO-ENTMCNC: 31.6 G/DL (ref 31.4–37.4)
MCV RBC AUTO: 99 FL (ref 82–98)
MONOCYTES # BLD AUTO: 0.57 THOUSAND/ΜL (ref 0.17–1.22)
MONOCYTES NFR BLD AUTO: 8 % (ref 4–12)
NEUTROPHILS # BLD AUTO: 5.76 THOUSANDS/ΜL (ref 1.85–7.62)
NEUTS SEG NFR BLD AUTO: 74 % (ref 43–75)
NRBC BLD AUTO-RTO: 0 /100 WBCS
PLATELET # BLD AUTO: 204 THOUSANDS/UL (ref 149–390)
PMV BLD AUTO: 10.1 FL (ref 8.9–12.7)
POTASSIUM SERPL-SCNC: 3.6 MMOL/L (ref 3.5–5.3)
RBC # BLD AUTO: 3.31 MILLION/UL (ref 3.88–5.62)
SODIUM SERPL-SCNC: 137 MMOL/L (ref 136–145)
TTG IGA SER-ACNC: <2 U/ML (ref 0–3)
WBC # BLD AUTO: 7.64 THOUSAND/UL (ref 4.31–10.16)

## 2022-06-29 PROCEDURE — 99232 SBSQ HOSP IP/OBS MODERATE 35: CPT | Performed by: INTERNAL MEDICINE

## 2022-06-29 PROCEDURE — 99232 SBSQ HOSP IP/OBS MODERATE 35: CPT | Performed by: SURGERY

## 2022-06-29 PROCEDURE — 80048 BASIC METABOLIC PNL TOTAL CA: CPT | Performed by: PHYSICIAN ASSISTANT

## 2022-06-29 PROCEDURE — 85025 COMPLETE CBC W/AUTO DIFF WBC: CPT | Performed by: PHYSICIAN ASSISTANT

## 2022-06-29 RX ORDER — HEPARIN SODIUM 5000 [USP'U]/ML
5000 INJECTION, SOLUTION INTRAVENOUS; SUBCUTANEOUS EVERY 8 HOURS SCHEDULED
Status: DISCONTINUED | OUTPATIENT
Start: 2022-06-29 | End: 2022-07-02

## 2022-06-29 RX ORDER — AMOXICILLIN 250 MG
1 CAPSULE ORAL ONCE
Status: COMPLETED | OUTPATIENT
Start: 2022-06-29 | End: 2022-06-29

## 2022-06-29 RX ADMIN — ACETAMINOPHEN 975 MG: 325 TABLET, FILM COATED ORAL at 21:46

## 2022-06-29 RX ADMIN — NICOTINE 1 PATCH: 14 PATCH, EXTENDED RELEASE TRANSDERMAL at 09:31

## 2022-06-29 RX ADMIN — OXYCODONE HYDROCHLORIDE 5 MG: 5 TABLET ORAL at 05:23

## 2022-06-29 RX ADMIN — LEVETIRACETAM 500 MG: 500 TABLET, FILM COATED ORAL at 09:25

## 2022-06-29 RX ADMIN — HEPARIN SODIUM 5000 UNITS: 5000 INJECTION INTRAVENOUS; SUBCUTANEOUS at 21:46

## 2022-06-29 RX ADMIN — PANTOPRAZOLE SODIUM 40 MG: 40 TABLET, DELAYED RELEASE ORAL at 05:23

## 2022-06-29 RX ADMIN — GABAPENTIN 300 MG: 300 CAPSULE ORAL at 09:16

## 2022-06-29 RX ADMIN — METOPROLOL SUCCINATE 100 MG: 100 TABLET, EXTENDED RELEASE ORAL at 09:17

## 2022-06-29 RX ADMIN — POLYETHYLENE GLYCOL 3350, SODIUM SULFATE ANHYDROUS, SODIUM BICARBONATE, SODIUM CHLORIDE, POTASSIUM CHLORIDE 4000 ML: 236; 22.74; 6.74; 5.86; 2.97 POWDER, FOR SOLUTION ORAL at 18:01

## 2022-06-29 RX ADMIN — ACETAMINOPHEN 975 MG: 325 TABLET, FILM COATED ORAL at 05:23

## 2022-06-29 RX ADMIN — DOCUSATE SODIUM AND SENNOSIDES 1 TABLET: 8.6; 5 TABLET ORAL at 11:09

## 2022-06-29 RX ADMIN — HEPARIN SODIUM 5000 UNITS: 5000 INJECTION INTRAVENOUS; SUBCUTANEOUS at 15:56

## 2022-06-29 RX ADMIN — HEPARIN SODIUM 5000 UNITS: 5000 INJECTION INTRAVENOUS; SUBCUTANEOUS at 09:05

## 2022-06-29 RX ADMIN — LEVETIRACETAM 500 MG: 500 TABLET, FILM COATED ORAL at 21:46

## 2022-06-29 RX ADMIN — ONDANSETRON 4 MG: 2 INJECTION INTRAMUSCULAR; INTRAVENOUS at 05:39

## 2022-06-29 RX ADMIN — DULOXETINE HYDROCHLORIDE 60 MG: 60 CAPSULE, DELAYED RELEASE ORAL at 09:23

## 2022-06-29 RX ADMIN — OXYCODONE HYDROCHLORIDE 5 MG: 5 TABLET ORAL at 17:59

## 2022-06-29 RX ADMIN — ATORVASTATIN CALCIUM 40 MG: 40 TABLET, FILM COATED ORAL at 09:27

## 2022-06-29 NOTE — ASSESSMENT & PLAN NOTE
S/p fall on Eliquis, received [facility]  TEG obtained on arrival to [facility] without coagulopathies  Second head CT noncontrast-impression denoting   Stable CT appearance of the brain with acute left frontal parietal subdural resulting in only minimal mass effect    Several areas of contusion in the infarcted parenchyma within the posterior right hemisphere are stable "  Continue to DVT prophylaxis for EGD to be performed today  Neurosurgery with no surgical intervention with d/c hot protocol  -Keppra seizure prophylaxis  -Continue to hold AC/AP  -PT OT evaluation and treatment  Continue to monitor neurological status with f/u ct if gcs drops points or greater; current GCS 15

## 2022-06-29 NOTE — ASSESSMENT & PLAN NOTE
Presented with coffee ground emesis, suspected upper GI source  EGD to be performed tomorrow per GI  GI continues to follow, appreciate continued recommendations from GI  Continue Protonix 40 mg IV b i d  Trend H&H  Patient to be made NPO midnight this evening for impending EGD tomorrow

## 2022-06-29 NOTE — ANESTHESIA PREPROCEDURE EVALUATION
Procedure:  PRE-OP ONLY    Relevant Problems   CARDIO   (+) Essential hypertension      GI/HEPATIC   (+) GERD (gastroesophageal reflux disease)   (+) GIB (gastrointestinal bleeding)      HEMATOLOGY   (+) Acute blood loss anemia      MUSCULOSKELETAL   (+) Cervical spondylosis   (+) Chronic bilateral low back pain without sciatica   (+) Lumbar spondylosis      NEURO/PSYCH   (+) Chronic bilateral low back pain without sciatica   (+) Chronic pain syndrome   (+) Hemiplegia, post-stroke (HCC)   (+) Major depressive disorder   (+) Subdural hematoma [facility])   52-year-old male with AFib on Eliquis, hypertension, GERD, CVA admitted with subdural hematoma  EGD/colono for evaluation of coffee-ground emesis and vomiting (now resolved )     · Neurosgy: Recommend SBP <160mmHg  No intervention for cva  Recent labs personally reviewed:  Lab Results   Component Value Date    WBC 6 15 06/30/2022    HGB 10 5 (L) 06/30/2022     06/30/2022     Lab Results   Component Value Date    K 3 6 06/30/2022    BUN 7 06/30/2022    CREATININE 0 71 06/30/2022     Lab Results   Component Value Date    PTT 27 06/27/2022      Lab Results   Component Value Date    INR 1 06 06/27/2022       No results found for: HGBA1C    Type and Screen:  O             Anesthesia Plan  ASA Score- 3     Anesthesia Type- IV sedation with anesthesia with ASA Monitors  Additional Monitors:   Airway Plan:     Comment: Supplemental O2, etco2 monitoring    Plan Factors-Exercise tolerance (METS): <4 METS  Chart reviewed  Existing labs reviewed  Patient summary reviewed  Patient is not a current smoker  Patient not instructed to abstain from smoking on day of procedure  Patient did not smoke on day of surgery  Induction- intravenous      Postoperative Plan-     Informed Consent-

## 2022-06-29 NOTE — PROGRESS NOTES
[address] [facility]  Progress Note - Kd Floyd 1950, 80 y o  male MRN: [id number]  Unit/Bed#: [facility] 6[facility]-01 Encounter: [id number]  Primary Care Provider: Fausto Sesay DO   Date and time admitted to hospital: 6/27/2022  5:41 PM    Acute blood loss anemia  Assessment & Plan  2/2 suspected upper GI bleed  Hgb stable at 10 3 improved from 9 7  Heparin for DVT prophylaxis; will hold DVT prophylaxis starting tomorrow for scheduled EGD  GI consulted and continues to follow  Will continue to trend H&H  Transfuse as necessary    Hemiplegia, post-stroke [facility])  Assessment & Plan  Chronic L sided hemiplegia, stable    Continue neuro checks    GIB (gastrointestinal bleeding)  Assessment & Plan  Presented with coffee ground emesis, suspected upper GI source  EGD to be performed tomorrow per GI  GI continues to follow, appreciate continued recommendations from GI  Continue Protonix 40 mg IV b i d  Trend H&H  Patient to be made NPO midnight this evening for impending EGD tomorrow  * Subdural hematoma (HCC)  Assessment & Plan  S/p fall on Eliquis, received [address]  TEG obtained on arrival to [facility] without coagulopathies  Second head CT noncontrast-impression denoting   Stable CT appearance of the brain with acute left frontal parietal subdural resulting in only minimal mass effect  Several areas of contusion in the infarcted parenchyma within the posterior right hemisphere are stable "  Continue to DVT prophylaxis for EGD to be performed today  Neurosurgery with no surgical intervention with d/c hot protocol  -Keppra seizure prophylaxis  -Continue to hold AC/AP  -PT OT evaluation and treatment  Continue to monitor neurological status with f/u ct if gcs drops points or greater; current GCS 15    Patient with no acute events overnight by patient or floor nursing staff    Patient with EGD to be performed tomorrow, heparin for DVT prophylaxis last dose this evening for scheduled EGD tomorrow  Disposition: stable    SUBJECTIVE:  Chief Complaint: fall    Subjective: "I still can't eat like I used to "    OBJECTIVE:   Vitals:   Temp:  [98 4 °F (36 9 °C)-98 7 °F (37 1 °C)] 98 4 °F (36 9 °C)  HR:  [51-56] 53  Resp:  [16-20] 17  BP: (117-166)/(67-80) 166/80    Intake/Output:  I/O       06/27 0701 06/28 0700 06/28 0701 06/29 0700 06/29 0701 06/30 0700    P  O   0     Total Intake(mL/kg)  0 (0)     Urine (mL/kg/hr)  1450 (1) 425 (2 1)    Total Output  1450 425    Net  -1450 -425                Nutrition: Diet Regular; Regular House  Diet NPO  GI Proph/Bowel Reg: senokot  VTE Prophylaxis:Heparin     Physical Exam:   GENERAL APPEARANCE: comfortable   NEURO:  Alert and oriented x3, no new focal neurological deficits  HEENT:  EOM intact, no pain on EOM, oropharynx clear and patent  CV:  Regular rate rhythm  LUNGS:  Clear to auscultation bilaterally  GI:  Soft nondistended, no tenderness  :  Voiding  MSK:  History of left-sided hemiplegia secondary to stroke history; right upper extremity 4/5, right lower extremity 4/5  SKIN:  Warm well perfused  Invasive Devices  Report    Peripheral Intravenous Line  Duration           Peripheral IV 06/27/22 Distal;Dorsal (posterior); Left Forearm 2 days    Peripheral IV 06/27/22 Dorsal (posterior); Right Hand 2 days          Drain  Duration           External Urinary Catheter Medium 1 day                      Lab Results:   BMP/CMP:   Lab Results   Component Value Date    SODIUM 137 06/29/2022    K 3 6 06/29/2022     06/29/2022    CO2 30 06/29/2022    BUN 11 06/29/2022    CREATININE 0 61 06/29/2022    CALCIUM 8 3 06/29/2022    EGFR 101 06/29/2022    and CBC:   Lab Results   Component Value Date    WBC 7 64 06/29/2022    HGB 10 3 (L) 06/29/2022    HCT 32 6 (L) 06/29/2022    MCV 99 (H) 06/29/2022     06/29/2022    MCH 31 1 06/29/2022    MCHC 31 6 06/29/2022    RDW 14 4 06/29/2022    MPV 10 1 06/29/2022    NRBC 0 06/29/2022     Imaging/EKG Studies: I have personally reviewed pertinent reports       Other Studies: none

## 2022-06-29 NOTE — UTILIZATION REVIEW
Initial Clinical Review  [facility] [address]    Admission: Date/Time/Statement:   Admission Orders (From admission, onward)     Ordered        06/27/22 0936  Inpatient Admission  Once                      Orders Placed This Encounter   Procedures    Inpatient Admission     Standing Status:   Standing     Number of Occurrences:   1     Order Specific Question:   Level of Care     Answer:   Level 2 Stepdown / HOT [14]     Order Specific Question:   Estimated length of stay     Answer:   More than 2 Midnights     Order Specific Question:   Certification     Answer:   I certify that inpatient services are medically necessary for this patient for a duration of greater than two midnights  See H&P and MD Progress Notes for additional information about the patient's course of treatment  Arrival Information     Patient transfer from [address] [place] [address] via ems due to bed capacity to [employer]                      chief complaint:  Coffee ground Emesis  Initial Presentation: 80 y o  male  From ED at [address] [place] [address] via ems admitted inpatient due to GI Bleed/Drowsiness  PMH of hemiplegia post-stroke, GERD, AAA, HLD,HTN  Initially presented to [facility] due to intermittent episodes of cof=ffee ground emesis starting week prior to arrival   In the ED appeared intoxicated  H&H 10 6/33 9  Ethanol < 10   Started on Protonix  At [facility], OW:  Per sister had fall 3 days prior to arrival, had episode of emesis en route with abdominal pain   + nausea  On exam: disheveled, drowsy, conversant  Contusion and bruising over left orbit  Abdominal tenderness, guarding  Answers questions appropriately  H&H 9 8/30/4  Plan is hold Eliquis, NPO  IVF, PPI every 12 hours, trend H&H every 8 hours, consult GI  Check ct head, c spine, facial bones and right shoulder  6/27/22 per GI - Patient with hematemesis on chronic anticoagulation/GERD and Macrocytic anemia  Baseline hemoglobin 12 to 13  Continue protonix, NPO  Monitor hgb every 12 hours  Addendum: egd not done due to ct of head findings of SDH       6/27/22 per  Neurosurgery:  Patient with altered mental status and ct head showed acute subdural hematoma  Decision made to transfer to trauma  Repeat ct in am   Reverse coagulopathies, monitor neuro exam      6/27/22 1635 transferred to [facility] as higher level of care due to need neurosurgery/trauma          ED Triage Vitals   Temperature Pulse Respirations Blood Pressure SpO2   06/27/22 0915 06/27/22 0915 06/27/22 0915 06/27/22 0915 06/27/22 0915   98 3 °F (36 8 °C) 61 18 118/59 95 %      Temp Source Heart Rate Source Patient Position - Orthostatic VS BP Location FiO2 (%)   06/27/22 0915 06/27/22 0915 06/27/22 0915 06/27/22 0915 --   Oral Monitor Lying Left arm       Pain Score       06/27/22 0815       No Pain          Wt Readings from Last 1 Encounters:   06/27/22 60 5 kg (133 lb 6 1 oz)     Additional Vital Signs:   06/27/22 1600 -- 60 19 140/72 100 92 % -- --   06/27/22 1500 -- 59 19 160/75 108 93 % -- --   06/27/22 1415 98 7 °F (37 1 °C) 54 Abnormal  18 126/61 88 96 % None (Room air) Lying   06/27/22 1243 99 1 °F (37 3 °C) 53 Abnormal  18 143/75 -- 97 % None (Room air)      06/27/22 1500 3 5 6 14 [address]   06/27/22 1400 3 5 6 14 [address]   06/27/22 1245 4 4 6 14 HBM   06/27/22 0815 4 4 6 14        Pertinent Labs/Diagnostic Test Results:   XR shoulder 2+ vw left   Final Result by Ifeoma Burr DO (06/27 1557)      No acute osseous abnormality  Degenerative changes as described  Workstation performed: [id number]         XR shoulder 2+ vw right   Final Result by Ifeoma Burr DO (06/27 1612)      No acute osseous abnormality              Workstation performed: [id number]         CT spine cervical wo contrast   Final Result by Corby Harding DO (06/27 1411)      Cervical degenerative change with moderate canal stenosis and foraminal narrowing  No acute osseous abnormality  Workstation performed: [id number]         CT head wo contrast   Final Result by Corby Baeza DO (06/27 1410)      Acute subdural hematoma within the left hemisphere superficial to the frontal and parietal lobes with only minimal mass effect upon the adjacent brain parenchyma  Small hemorrhagic contusions are seen within the posterior aspect of the right hemisphere, within an area of encephalomalacia related to previous old infarct  No resulting mass effect  Additional chronic microangiopathic change within the brain parenchyma  I personally discussed this study with VIKKI ALBARADO on 6/27/2022 at 2:10 PM                            Workstation performed: [id number]         CT facial bones wo contrast   Final Result by Corby Baeza DO (06/27 1412)      No acute osseous abnormality  Subtle left facial soft tissue contusion superficial to the frontal bone and orbit  There is  an acute subdural hematoma within the left hemisphere  See separate CT brain report  Workstation performed: [id number]           6/27/22 CxR - No acute cardiopulmonary disease noted  2   Tortuous thoracic aorta with possible mild aneurysmal dilatation of descending aorta  Results from last 7 days   Lab Units 06/29/22  0502 06/28/22  1140 06/28/22  0604 06/27/22  2359 06/27/22  1856 06/27/22  1620 06/27/22  0706   WBC Thousand/uL 7 64 7 63 6 41  --  7 86  --  8 56   HEMOGLOBIN g/dL 10 3* 9 7* 9 7* 9 6* 9 9*   < > 9 8*   HEMATOCRIT % 32 6* 31 1* 31 5*  --  31 1*  --  30 4*   PLATELETS Thousands/uL 204 170 189  --  203  --  216   NEUTROS ABS Thousands/µL 5 76  --  3 54  --  4 84  --  5 82    < > = values in this interval not displayed       Results from last 7 days   Lab Units 06/29/22  0502 06/28/22  1140 06/28/22  0604 06/27/22  1856 06/27/22  0230   SODIUM mmol/L 137 138 141 141 136   POTASSIUM mmol/L 3 6 3 7 3 3* 3 6 3 5   CHLORIDE mmol/L 103 105 107 107 97   CO2 mmol/L 30 28 31 28 28   ANION GAP mmol/L 4 5 3* 6 11   BUN mg/dL 11 13 15 15 23   CREATININE mg/dL 0 61 0 66 0 66 0 71 0 75   EGFR ml/min/1 73sq m 101 97 97 95 92   CALCIUM mg/dL 8 3 8 1* 8 2* 8 3 9 2   MAGNESIUM mg/dL  --  2 1 2 1  --  1 9   PHOSPHORUS mg/dL  --  2 3 2 2*  --   --      Results from last 7 days   Lab Units 06/27/22  0230   AST U/L 21   ALT U/L 11   ALK PHOS U/L 75   TOTAL PROTEIN g/dL 6 5   ALBUMIN g/dL 4 0   TOTAL BILIRUBIN mg/dL 0 73     Results from last 7 days   Lab Units 06/29/22  0502 06/28/22  1140 06/28/22  0604 06/27/22  1856 06/27/22  0230   GLUCOSE RANDOM mg/dL 101 82 82 100 114     Results from last 7 days   Lab Units 06/27/22  0706 06/27/22  0510 06/27/22  0230   HS TNI 0HR ng/L  --   --  8   HS TNI 2HR ng/L  --  9  --    HSTNI D2 ng/L  --  1  --    HS TNI 4HR ng/L 9  --   --    HSTNI D4 ng/L 1  --   --      Results from last 7 days   Lab Units 06/27/22  1302   PROTIME seconds 13 7   INR  1 06   PTT seconds 27     Results from last 7 days   Lab Units 06/27/22  0230   LACTIC ACID mmol/L 1 1     Results from last 7 days   Lab Units 06/27/22  0954   FERRITIN ng/mL 34     Results from last 7 days   Lab Units 06/27/22  0230   LIPASE u/L 12     Results from last 7 days   Lab Units 06/27/22  1942   AMPH/METH  Positive*   BARBITURATE UR  Negative   BENZODIAZEPINE UR  Negative   COCAINE UR  Negative   METHADONE URINE  Negative   OPIATE UR  Negative   PCP UR  Negative   THC UR  Positive*     Results from last 7 days   Lab Units 06/27/22  0230   ETHANOL LVL mg/dL <10         Past Medical History:   Diagnosis Date    Hypertension      Present on Admission:   Chronic pain syndrome      Admitting Diagnosis: Upper GI bleed [K92 2]  Age/Sex: 80 y o  male  Admission Orders:  Scheduled Medications:  Medication    pantoprazole (PROTONIX) injection 40 mg IV every 12 hours       nicotine (NICODERM CQ) 7 mg/24hr TD 24 hr patch 1 patch TD daily    sodium chloride 0 9 % infusion 125 cc/h IV continuous       metoclopramide (REGLAN) injection 10 mg  Dose: 10 mg  Freq: Once Route: IV  Start: 06/27/22 1200 End: 06/27/22 1212    prothrombin complex conc human (KCENTRA) 1,500 Units  Dose: 25 Units/kg  Weight Dosing Info: 60 5 kg  Freq: Once Route: IV  Start: 06/27/22 1530 End: 06/27/22 1535    IP CONSULT TO GASTROENTEROLOGY    Network Utilization Review Department  ATTENTION: Please call with any questions or concerns to [phone] and carefully listen to the prompts so that you are directed to the right person  All voicemails are confidential   Fiorella Mcgowan all requests for admission clinical reviews, approved or denied determinations and any other requests to dedicated fax number below belonging to the campus where the patient is receiving treatment   List of dedicated fax numbers for the Facilities:  [address] DENIALS (Administrative/Medical Necessity) [phone]   [address] (Maternity/NICU/Pediatrics) [phone] [address]  [address] [address] [place] [address] [address] [facility] [place] [address] [phone]

## 2022-06-29 NOTE — PROGRESS NOTES
Progress Note - Geriatric Medicine   Jeny Martell 80 y o  male MRN: [id number]  [facility] Encounter: [id number]      Assessment/Plan:     Ambulatory dysfunction with fall  -injuries as outlined below  -remains high risk falls, continue fall precautions, assist with transfers, maintain environment free of fall hazards and address modifiable fall risk factors  -following recovery from acute injuries/illness consider referral to long-term fall risk reduction program such as matter of balance or Silver sneakers    Left subdural hematoma   -s/p fall as outlined above  -noted on [facility] 6/27/22, reportedly stable on follow-up [facility] 6/28/22  -AC/AP on hold    -Eliquis reversed with Kcentra on initial presentation  -neuro checks per protocol   -Nsx on board     Right hemisphere hemorrhagic contusions  -plan as above    Hematemesis  -recurrent, improves with antiemetics and protonix   -Hb remains stable  -GI on board, rec EGD and C-scope during current admission, tentative for tomorrow    Acute pain due to trauma  -continue acute multimodal pain control     Left hemiparesis  -late effect of prior CVA  -continue supportive cares and secondary risk factor modifications to minimize risk recurrence as much as possible    Depression  -symptoms appear to be stable on home Cymbalta regimen  -continue close follow-up with PCP for ongoing management  -encourage outpatient referral to counseling/support group as part of comprehensive multimodal treatment approach    Toxicology  -UDS on admission positive for amphetamines/methamphetamines and THC  -does not appear to be acutely intoxicated at time of evaluation  -no evidence of withdrawal symptoms at this time    Nicotine dependence due to cigarettes  -continue to support goal of cessation, provide support daily cessation counseling  -currently finding relief with nicotine patch - defer to GI regarding continuation or d/c due to GI and wound healing effects in case GI ulcerations are found on upcoming EGD/C-scope    High risk of developing delirium  -continue to ensure acute pain is well controlled  -monitor mentation with any medication regimen adjustments  -encourage normal circadian rhythm  -monitor electrolytes and replete as necessary - high risk hyponatremia with use of Cymbalta  -consider hold parameters for sedating medications to reduce risk over-sedation  -monitor for drug and nicotine withdrawal symptoms and address as arise  -reorient frequent as appropriate and indicated    Care coordination: Rounded with Elba (RN) and Felton (Trauma AP)     Subjective:     Patient seen and examined at bedside, he reports that he did not sleep well overnight due to pain and nausea, last episode of vomiting yesterday, appetite is good and he is very hungry but is unable to tolerate much oral intake due to pain  He is being followed by GI and reports that he is recommended for EGD and C-scope tomorrow, he is amenable to EGD but is concerned that he will not be able to tolerate prep  Continues to have persistent headache  Nursing reports no acute events overnight  Review of Systems   Constitutional: Negative for appetite change (hungry but feels intake limited due to pain), chills and fever  HENT: Negative  Eyes: Negative  Respiratory: Negative  Cardiovascular: Negative  Gastrointestinal: Positive for abdominal pain and nausea  Negative for vomiting (last episode vomiting yesterday)  Genitourinary: Negative  Musculoskeletal: Negative  Skin: Negative  Neurological: Positive for dizziness, light-headedness and headaches  Hematological: Negative  Psychiatric/Behavioral: Positive for sleep disturbance  All other systems reviewed and are negative  Objective:     Vitals: Blood pressure 155/76, pulse (!) 54, temperature 98 4 °F (36 9 °C), resp  rate 17, height 5' 10" (1 778 m), weight 60 5 kg (133 lb 6 1 oz), SpO2 96 %  ,Body mass index is 19 14 kg/m²  Intake/Output Summary (Last 24 hours) at 6/29/2022 0829  Last data filed at 6/29/2022 0617  Gross per 24 hour   Intake 0 ml   Output 900 ml   Net -900 ml     Current Medications: Reviewed    Physical Exam:   Physical Exam  Vitals and nursing note reviewed  Constitutional:       Appearance: He is ill-appearing  Comments: Thin frail elderly male appears uncomfortable    HENT:      Head: Normocephalic  Comments: Left facial ecchymosis      Nose: Nose normal       Mouth/Throat:      Mouth: Mucous membranes are dry  Eyes:      General:         Right eye: No discharge  Left eye: No discharge  Neck:      Comments: Trachea midline, phonation normal  Cardiovascular:      Rate and Rhythm: Regular rhythm  Bradycardia present  Pulmonary:      Effort: No respiratory distress  Breath sounds: No wheezing  Abdominal:      Palpations: Abdomen is soft  Musculoskeletal:      Comments: Diffuse severe subcutaneous fat and muscle wasting   Skin:     General: Skin is warm and dry  Comments: Thin and friable    Neurological:      Mental Status: He is alert  Comments: Awake alert oriented, answers questions appropriately, moves all 4 extremities spontaneously, follows commands   Psychiatric:      Comments: Mood and affect appropriate for current circumstances, remains pleasant, cooperative and very polite         Invasive Devices  Report    Peripheral Intravenous Line  Duration           Peripheral IV 06/27/22 Dorsal (posterior); Right Hand 2 days    Peripheral IV 06/27/22 Distal;Dorsal (posterior); Left Forearm 1 day          Drain  Duration           External Urinary Catheter Medium 1 day              Lab, Imaging and other studies: I have personally reviewed pertinent reports

## 2022-06-29 NOTE — UTILIZATION REVIEW
Inpatient Admission Authorization Request   NOTIFICATION OF INPATIENT ADMISSION/INPATIENT AUTHORIZATION REQUEST   SERVICING FACILITY:   [address] [place] [address]  Mathew Riggs [address] [facility], [address]  Tax ID: [id number]  NPI: [id number]  Place of [facility]: [facility]patient [facility]  [address]  Place of Service Code: 24     ATTENDING PROVIDER:  Attending Name and NPI#: Dewayne Salome, Ashley Lamine Lennon [[id number]]  Address: Mathew Riggs [address] [place] [address], [address]  Phone: [phone]     UTILIZATION REVIEW CONTACT:  Rakel Jernigan, Utilization   Network Utilization Review Department  Phone: [phone]  Fax [phone]  Email: [email or web address]     PHYSICIAN ADVISORY SERVICES:  FOR NVFT-PI-OTXC REVIEW - MEDICAL NECESSITY DENIAL  Phone: [phone]  Fax: [phone]  Email: [email or web address]     TYPE OF REQUEST:  Inpatient Status     ADMISSION INFORMATION:  ADMISSION DATE/TIME: 6/27/22  9:36 AM  PATIENT DIAGNOSIS CODE/DESCRIPTION:  Upper GI bleed [K92 2]  DISCHARGE DATE/TIME: 6/27/2022  4:35 PM   IMPORTANT INFORMATION:  Please contact Ceci King directly with any questions or concerns regarding this request  Department voicemails are confidential     Send requests for admission clinical reviews, concurrent reviews, approvals, and administrative denials due to lack of clinical to fax [phone]

## 2022-06-29 NOTE — ASSESSMENT & PLAN NOTE
2/2 suspected upper GI bleed  Hgb stable at 10 3 improved from 9 7  Heparin for DVT prophylaxis; will hold DVT prophylaxis starting tomorrow for scheduled EGD  GI consulted and continues to follow  Will continue to trend H&H  Transfuse as necessary

## 2022-06-29 NOTE — QUICK NOTE
Attestation note by Mony Gonsales MD at 6/28/2022  6:40 PM; GI with recommendation for EGD and colonoscopy tomorrow    EGD and colonoscopy scheduled for tommorrow    Confirmation from tiger text by Dr Adrian Frederick, GI Fellow [id number]; 6/29/2022

## 2022-06-29 NOTE — PROGRESS NOTES
Gastroenterology Progress Note      PATIENT INFORMATION      Patient: Jeffry Coleman 80 y o  male   MRN: [id number]  PCP: Kin Moyer DO  Unit/Bed#: [facility] 628-51 Encounter: [id number]  Date Of Visit: 22  Current Length of Stay: 2 day(s)     ASSESSMENTS & PLAN    Jeffry Coleman is a 80 y o  old male with PMH of atrial fibrillation on eliquis, HTN, GERD, HLD, CVA who presents with fall and subdural hematoma      Gastroenterology has been consulted for assistance with management of hematemesis     Hematemesis/vomiting  · Now resolved  · Reportedly had multiple episodes of bloody vomitus prior to presentation, none in the hospital  · Suspect secondary to subdural hematoma after fall  · Currently no further episodes and hemoglobin has remained stable  · Patient is now agreeable to an upper endoscopy tomorrow but would like to defer colonoscopy as an outpatient in the next 6 months  · Plan for EGD tomorrow  · Continue PPI IV BID  · NPO at midnight      Subdural hematoma  · Secondary to fall  · Eliquis held and reversed with K-centra  · [facility]surgical evaluation pending at [facility]      Disposition: EGD tomorrow      SUBJECTIVE     Patient denies abdominal pain, nausea, vomiting, heartburn, dysphagia, constipation, diarrhea, blood in stools  OBJECTIVE     Vitals:   Temp (24hrs), Av 6 °F (37 °C), Min:98 4 °F (36 9 °C), Max:98 7 °F (37 1 °C)    Temp:  [98 4 °F (36 9 °C)-98 7 °F (37 1 °C)] 98 4 °F (36 9 °C)  HR:  [51-56] 53  Resp:  [16-20] 17  BP: (117-166)/(67-80) 166/80  SpO2:  [89 %-96 %] 96 %  Body mass index is 19 14 kg/m²  Input and Output Summary (last 24 hours):        Intake/Output Summary (Last 24 hours) at 2022 2824  Last data filed at 2022 0901  Gross per 24 hour   Intake 0 ml   Output 1325 ml   Net -1325 ml       Physical Exam:   GENERAL: Non toxic appearing  HEENT:  Normocephalic, atraumatic, pupils bilaterally reactive to light  CARDIAC:  Regular rate and rhythm, S1, S2 heard, no murmurs  PULMONARY:  CTA B/L, no wheezing/rales/rhonci, non-labored breathing  ABDOMEN:  Soft, NT/ND, +BS, no rebound/guarding/rigidity  Extremities:  2+ Pulses in DP/PT  No edema, cyanosis, or clubbing  NEUROLOGIC:  Alert/oriented x3  SKIN:  No rashes or erythema          ADDITIONAL DATA     Labs & Recent Cultures:     Results from last 7 days   Lab Units 06/29/22  0502   WBC Thousand/uL 7 64   HEMOGLOBIN g/dL 10 3*   HEMATOCRIT % 32 6*   PLATELETS Thousands/uL 204   NEUTROS PCT % 74   LYMPHS PCT % 15   MONOS PCT % 8   EOS PCT % 2     Results from last 7 days   Lab Units 06/29/22  0502 06/27/22  1856 06/27/22  0230   POTASSIUM mmol/L 3 6   < > 3 5   CHLORIDE mmol/L 103   < > 97   CO2 mmol/L 30   < > 28   BUN mg/dL 11   < > 23   CREATININE mg/dL 0 61   < > 0 75   CALCIUM mg/dL 8 3   < > 9 2   ALK PHOS U/L  --   --  75   ALT U/L  --   --  11   AST U/L  --   --  21    < > = values in this interval not displayed  Results from last 7 days   Lab Units 06/27/22  1302   INR  1 06                 Nutrition:  Diet Regular; Regular House  Radiology Results:   CT head wo contrast   Final Result by Soren Clifton MD (06/28 0800)      Stable CT appearance of the brain with acute left frontal parietal subdural resulting in only minimal mass effect  Several areas of contusion in the infarcted parenchyma within the posterior right hemisphere are stable                    Workstation performed: [id number]         CT head wo contrast    (Results Pending)     Scheduled Medications:  atorvastatin, 40 mg, Daily  DULoxetine, 60 mg, Daily  gabapentin, 300 mg, Daily  heparin (porcine), 5,000 Units, Q8H CARLO  levETIRAcetam, 500 mg, Q12H CARLO  metoprolol succinate, 100 mg, Daily  nicotine, 1 patch, Daily  pantoprazole, 40 mg, Early Morning        PRN MEDS:  acetaminophen, 975 mg, Q6H PRN  diphenhydrAMINE, 25 mg, Q6H PRN  HYDROmorphone, 0 2 mg, Q3H PRN  ondansetron, 4 mg, Q6H PRN  oxyCODONE, 2 5 mg, Q4H PRN  oxyCODONE, 5 mg, Q4H PRN        Last 24 Hours Medication List:   Current Facility-Administered Medications   Medication Dose Route Frequency Provider Last Rate    acetaminophen  975 mg Oral Q6H PRN Khalif Moulton, DO      atorvastatin  40 mg Oral Daily Aziza Jaimes, DO      diphenhydrAMINE  25 mg Intravenous Q6H PRN Khalif Moulton, DO      DULoxetine  60 mg Oral Daily Khalif Moulton, DO      gabapentin  300 mg Oral Daily Khalif Moulton, DO      heparin (porcine)  5,000 Units Subcutaneous [place], [place]      HYDROmorphone  0 2 mg Intravenous Q3H PRN Brett Jaimes, DO      levETIRAcetam  500 mg Oral Q12H [address] Aziza Jaimes, DO      metoprolol succinate  100 mg Oral Daily Khalif Moulton, DO      nicotine  1 patch Transdermal Daily Khalif Moulton, DO      ondansetron  4 mg Intravenous Q6H PRN Khalif Moulton, DO      oxyCODONE  2 5 mg Oral Q4H PRN Khalif Moulton, DO      oxyCODONE  5 mg Oral Q4H PRN Khalif Moulton, DO      pantoprazole  40 mg Oral Early Morning Patt Ramos DO            Time Spent for Care: 30 mins spent in total   More than 50% of total time spent on counseling and coordination of care as described above  Current Length of Stay: 2 day(s)      Code Status: Level 1 - Full Code          ** Please Note: This note is constructed using a voice recognition dictation system   **

## 2022-06-30 ENCOUNTER — APPOINTMENT (OUTPATIENT)
Dept: GASTROENTEROLOGY | Facility: HOSPITAL | Age: 72
DRG: 086 | End: 2022-06-30
Payer: COMMERCIAL

## 2022-06-30 ENCOUNTER — ANESTHESIA EVENT (INPATIENT)
Dept: GASTROENTEROLOGY | Facility: HOSPITAL | Age: 72
DRG: 086 | End: 2022-06-30
Payer: COMMERCIAL

## 2022-06-30 ENCOUNTER — ANESTHESIA (INPATIENT)
Dept: GASTROENTEROLOGY | Facility: HOSPITAL | Age: 72
DRG: 086 | End: 2022-06-30
Payer: COMMERCIAL

## 2022-06-30 LAB
ANION GAP SERPL CALCULATED.3IONS-SCNC: 7 MMOL/L (ref 4–13)
BASOPHILS # BLD AUTO: 0.04 THOUSANDS/ΜL (ref 0–0.1)
BASOPHILS NFR BLD AUTO: 1 % (ref 0–1)
BUN SERPL-MCNC: 7 MG/DL (ref 5–25)
CALCIUM SERPL-MCNC: 8.9 MG/DL (ref 8.3–10.1)
CHLORIDE SERPL-SCNC: 102 MMOL/L (ref 100–108)
CO2 SERPL-SCNC: 30 MMOL/L (ref 21–32)
CREAT SERPL-MCNC: 0.71 MG/DL (ref 0.6–1.3)
EOSINOPHIL # BLD AUTO: 0.09 THOUSAND/ΜL (ref 0–0.61)
EOSINOPHIL NFR BLD AUTO: 2 % (ref 0–6)
ERYTHROCYTE [DISTWIDTH] IN BLOOD BY AUTOMATED COUNT: 14.3 % (ref 11.6–15.1)
GFR SERPL CREATININE-BSD FRML MDRD: 95 ML/MIN/1.73SQ M
GLUCOSE SERPL-MCNC: 97 MG/DL (ref 65–140)
HCT VFR BLD AUTO: 32.3 % (ref 36.5–49.3)
HGB BLD-MCNC: 10.5 G/DL (ref 12–17)
IMM GRANULOCYTES # BLD AUTO: 0.02 THOUSAND/UL (ref 0–0.2)
IMM GRANULOCYTES NFR BLD AUTO: 0 % (ref 0–2)
LYMPHOCYTES # BLD AUTO: 1.69 THOUSANDS/ΜL (ref 0.6–4.47)
LYMPHOCYTES NFR BLD AUTO: 28 % (ref 14–44)
MCH RBC QN AUTO: 31.6 PG (ref 26.8–34.3)
MCHC RBC AUTO-ENTMCNC: 32.5 G/DL (ref 31.4–37.4)
MCV RBC AUTO: 97 FL (ref 82–98)
MONOCYTES # BLD AUTO: 0.66 THOUSAND/ΜL (ref 0.17–1.22)
MONOCYTES NFR BLD AUTO: 11 % (ref 4–12)
NEUTROPHILS # BLD AUTO: 3.65 THOUSANDS/ΜL (ref 1.85–7.62)
NEUTS SEG NFR BLD AUTO: 58 % (ref 43–75)
NRBC BLD AUTO-RTO: 0 /100 WBCS
PLATELET # BLD AUTO: 197 THOUSANDS/UL (ref 149–390)
PMV BLD AUTO: 10.5 FL (ref 8.9–12.7)
POTASSIUM SERPL-SCNC: 3.6 MMOL/L (ref 3.5–5.3)
RBC # BLD AUTO: 3.32 MILLION/UL (ref 3.88–5.62)
SODIUM SERPL-SCNC: 139 MMOL/L (ref 136–145)
WBC # BLD AUTO: 6.15 THOUSAND/UL (ref 4.31–10.16)

## 2022-06-30 PROCEDURE — 80048 BASIC METABOLIC PNL TOTAL CA: CPT | Performed by: STUDENT IN AN ORGANIZED HEALTH CARE EDUCATION/TRAINING PROGRAM

## 2022-06-30 PROCEDURE — 85025 COMPLETE CBC W/AUTO DIFF WBC: CPT | Performed by: STUDENT IN AN ORGANIZED HEALTH CARE EDUCATION/TRAINING PROGRAM

## 2022-06-30 PROCEDURE — 0DB68ZX EXCISION OF STOMACH, VIA NATURAL OR ARTIFICIAL OPENING ENDOSCOPIC, DIAGNOSTIC: ICD-10-PCS | Performed by: INTERNAL MEDICINE

## 2022-06-30 PROCEDURE — 88305 TISSUE EXAM BY PATHOLOGIST: CPT | Performed by: SPECIALIST

## 2022-06-30 PROCEDURE — 0DB98ZX EXCISION OF DUODENUM, VIA NATURAL OR ARTIFICIAL OPENING ENDOSCOPIC, DIAGNOSTIC: ICD-10-PCS | Performed by: INTERNAL MEDICINE

## 2022-06-30 PROCEDURE — 0DB78ZX EXCISION OF STOMACH, PYLORUS, VIA NATURAL OR ARTIFICIAL OPENING ENDOSCOPIC, DIAGNOSTIC: ICD-10-PCS | Performed by: INTERNAL MEDICINE

## 2022-06-30 PROCEDURE — 99232 SBSQ HOSP IP/OBS MODERATE 35: CPT | Performed by: STUDENT IN AN ORGANIZED HEALTH CARE EDUCATION/TRAINING PROGRAM

## 2022-06-30 PROCEDURE — 0DB18ZX EXCISION OF UPPER ESOPHAGUS, VIA NATURAL OR ARTIFICIAL OPENING ENDOSCOPIC, DIAGNOSTIC: ICD-10-PCS | Performed by: INTERNAL MEDICINE

## 2022-06-30 PROCEDURE — 45378 DIAGNOSTIC COLONOSCOPY: CPT | Performed by: INTERNAL MEDICINE

## 2022-06-30 PROCEDURE — 0DJD8ZZ INSPECTION OF LOWER INTESTINAL TRACT, VIA NATURAL OR ARTIFICIAL OPENING ENDOSCOPIC: ICD-10-PCS | Performed by: INTERNAL MEDICINE

## 2022-06-30 PROCEDURE — 43239 EGD BIOPSY SINGLE/MULTIPLE: CPT | Performed by: INTERNAL MEDICINE

## 2022-06-30 RX ORDER — SODIUM CHLORIDE, SODIUM GLUCONATE, SODIUM ACETATE, POTASSIUM CHLORIDE, MAGNESIUM CHLORIDE, SODIUM PHOSPHATE, DIBASIC, AND POTASSIUM PHOSPHATE .53; .5; .37; .037; .03; .012; .00082 G/100ML; G/100ML; G/100ML; G/100ML; G/100ML; G/100ML; G/100ML
100 INJECTION, SOLUTION INTRAVENOUS CONTINUOUS
Status: DISCONTINUED | OUTPATIENT
Start: 2022-06-30 | End: 2022-07-01

## 2022-06-30 RX ORDER — GLYCOPYRROLATE 0.2 MG/ML
INJECTION INTRAMUSCULAR; INTRAVENOUS AS NEEDED
Status: DISCONTINUED | OUTPATIENT
Start: 2022-06-30 | End: 2022-06-30

## 2022-06-30 RX ORDER — ALBUMIN, HUMAN INJ 5% 5 %
SOLUTION INTRAVENOUS CONTINUOUS PRN
Status: DISCONTINUED | OUTPATIENT
Start: 2022-06-30 | End: 2022-06-30

## 2022-06-30 RX ORDER — HYDROXYZINE HYDROCHLORIDE 25 MG/1
25 TABLET, FILM COATED ORAL EVERY 6 HOURS PRN
Status: DISCONTINUED | OUTPATIENT
Start: 2022-06-30 | End: 2022-07-04 | Stop reason: HOSPADM

## 2022-06-30 RX ORDER — EPHEDRINE SULFATE 50 MG/ML
INJECTION INTRAVENOUS AS NEEDED
Status: DISCONTINUED | OUTPATIENT
Start: 2022-06-30 | End: 2022-06-30

## 2022-06-30 RX ORDER — ONDANSETRON 2 MG/ML
4 INJECTION INTRAMUSCULAR; INTRAVENOUS ONCE
Status: COMPLETED | OUTPATIENT
Start: 2022-06-30 | End: 2022-06-30

## 2022-06-30 RX ORDER — PROPOFOL 10 MG/ML
INJECTION, EMULSION INTRAVENOUS CONTINUOUS PRN
Status: DISCONTINUED | OUTPATIENT
Start: 2022-06-30 | End: 2022-06-30

## 2022-06-30 RX ORDER — PROPOFOL 10 MG/ML
INJECTION, EMULSION INTRAVENOUS AS NEEDED
Status: DISCONTINUED | OUTPATIENT
Start: 2022-06-30 | End: 2022-06-30

## 2022-06-30 RX ORDER — SODIUM CHLORIDE 9 MG/ML
INJECTION, SOLUTION INTRAVENOUS CONTINUOUS PRN
Status: DISCONTINUED | OUTPATIENT
Start: 2022-06-30 | End: 2022-06-30

## 2022-06-30 RX ORDER — METOCLOPRAMIDE HYDROCHLORIDE 5 MG/ML
10 INJECTION INTRAMUSCULAR; INTRAVENOUS EVERY 6 HOURS PRN
Status: DISCONTINUED | OUTPATIENT
Start: 2022-06-30 | End: 2022-07-03

## 2022-06-30 RX ADMIN — GLYCOPYRROLATE 0.1 MG: 0.2 INJECTION, SOLUTION INTRAMUSCULAR; INTRAVENOUS at 13:39

## 2022-06-30 RX ADMIN — SODIUM CHLORIDE, SODIUM GLUCONATE, SODIUM ACETATE, POTASSIUM CHLORIDE, MAGNESIUM CHLORIDE, SODIUM PHOSPHATE, DIBASIC, AND POTASSIUM PHOSPHATE 100 ML/HR: .53; .5; .37; .037; .03; .012; .00082 INJECTION, SOLUTION INTRAVENOUS at 10:07

## 2022-06-30 RX ADMIN — OXYCODONE HYDROCHLORIDE 5 MG: 5 TABLET ORAL at 22:01

## 2022-06-30 RX ADMIN — HYDROXYZINE HYDROCHLORIDE 25 MG: 25 TABLET ORAL at 18:07

## 2022-06-30 RX ADMIN — LIDOCAINE HYDROCHLORIDE 100 MG: 20 INJECTION INTRAVENOUS at 13:41

## 2022-06-30 RX ADMIN — OXYCODONE HYDROCHLORIDE 5 MG: 5 TABLET ORAL at 17:28

## 2022-06-30 RX ADMIN — EPHEDRINE SULFATE 10 MG: 50 INJECTION INTRAVENOUS at 14:19

## 2022-06-30 RX ADMIN — METOCLOPRAMIDE HYDROCHLORIDE 10 MG: 5 INJECTION INTRAMUSCULAR; INTRAVENOUS at 18:07

## 2022-06-30 RX ADMIN — GABAPENTIN 300 MG: 300 CAPSULE ORAL at 15:30

## 2022-06-30 RX ADMIN — PROPOFOL 50 MG: 10 INJECTION, EMULSION INTRAVENOUS at 13:41

## 2022-06-30 RX ADMIN — PROPOFOL 50 MG: 10 INJECTION, EMULSION INTRAVENOUS at 13:42

## 2022-06-30 RX ADMIN — LEVETIRACETAM 500 MG: 500 TABLET, FILM COATED ORAL at 09:01

## 2022-06-30 RX ADMIN — OXYCODONE HYDROCHLORIDE 5 MG: 5 TABLET ORAL at 10:43

## 2022-06-30 RX ADMIN — ACETAMINOPHEN 975 MG: 325 TABLET, FILM COATED ORAL at 10:43

## 2022-06-30 RX ADMIN — PROPOFOL 120 MCG/KG/MIN: 10 INJECTION, EMULSION INTRAVENOUS at 13:45

## 2022-06-30 RX ADMIN — ATORVASTATIN CALCIUM 40 MG: 40 TABLET, FILM COATED ORAL at 15:30

## 2022-06-30 RX ADMIN — LEVETIRACETAM 500 MG: 500 TABLET, FILM COATED ORAL at 22:02

## 2022-06-30 RX ADMIN — OXYCODONE HYDROCHLORIDE 5 MG: 5 TABLET ORAL at 03:52

## 2022-06-30 RX ADMIN — EPHEDRINE SULFATE 10 MG: 50 INJECTION INTRAVENOUS at 14:06

## 2022-06-30 RX ADMIN — EPHEDRINE SULFATE 10 MG: 50 INJECTION INTRAVENOUS at 14:09

## 2022-06-30 RX ADMIN — HEPARIN SODIUM 5000 UNITS: 5000 INJECTION INTRAVENOUS; SUBCUTANEOUS at 22:02

## 2022-06-30 RX ADMIN — METOPROLOL SUCCINATE 100 MG: 100 TABLET, EXTENDED RELEASE ORAL at 09:02

## 2022-06-30 RX ADMIN — PROPOFOL 25 MG: 10 INJECTION, EMULSION INTRAVENOUS at 13:43

## 2022-06-30 RX ADMIN — ONDANSETRON 4 MG: 2 INJECTION INTRAMUSCULAR; INTRAVENOUS at 14:59

## 2022-06-30 RX ADMIN — ONDANSETRON 4 MG: 2 INJECTION INTRAMUSCULAR; INTRAVENOUS at 09:02

## 2022-06-30 RX ADMIN — ACETAMINOPHEN 975 MG: 325 TABLET, FILM COATED ORAL at 03:54

## 2022-06-30 RX ADMIN — HEPARIN SODIUM 5000 UNITS: 5000 INJECTION INTRAVENOUS; SUBCUTANEOUS at 15:30

## 2022-06-30 RX ADMIN — ONDANSETRON 4 MG: 2 INJECTION INTRAMUSCULAR; INTRAVENOUS at 15:30

## 2022-06-30 RX ADMIN — DULOXETINE HYDROCHLORIDE 60 MG: 60 CAPSULE, DELAYED RELEASE ORAL at 15:30

## 2022-06-30 RX ADMIN — ALBUMIN (HUMAN): 12.5 INJECTION, SOLUTION INTRAVENOUS at 14:11

## 2022-06-30 RX ADMIN — NICOTINE 1 PATCH: 14 PATCH, EXTENDED RELEASE TRANSDERMAL at 10:44

## 2022-06-30 RX ADMIN — PANTOPRAZOLE SODIUM 40 MG: 40 TABLET, DELAYED RELEASE ORAL at 05:01

## 2022-06-30 RX ADMIN — ONDANSETRON 4 MG: 2 INJECTION INTRAMUSCULAR; INTRAVENOUS at 22:54

## 2022-06-30 RX ADMIN — PROPOFOL 25 MG: 10 INJECTION, EMULSION INTRAVENOUS at 13:44

## 2022-06-30 NOTE — ASSESSMENT & PLAN NOTE
2/2 suspected upper GI bleed  Hgb stable at 10 5  Heparin for DVT prophylaxis; with GI consult reporting approval to continue for Heparin DVT prophylaxis  EGD scheduled, colonoscopy scheduled  GI following and appreciate continued recommendations    Will continue to trend H&H  Transfuse as necessary

## 2022-06-30 NOTE — CASE MANAGEMENT
Case Management Discharge Planning Note    Patient name Jonny Plasencia  [facility] [address] [facility]/PPHP 479-88 MRN [id number]  : 1950 Date 2022       Current Admission Date: 2022  Current Admission Diagnosis:Subdural hematoma [facility])   Patient Active Problem List    Diagnosis Date Noted    Acute blood loss anemia 2022    GIB (gastrointestinal bleeding) 2022    Hemiplegia, post-stroke ([facility] [address] ) 2022    Essential hypertension 2022    GERD (gastroesophageal reflux disease) 2022    Major depressive disorder 2022    Tobacco use 2022    Subdural hematoma ([facility] [address] ) 2022    Chronic bilateral low back pain without sciatica 2020    Cervical spinal stenosis 2019    Cervical spondylosis 2019    Cervical radiculopathy 2019    Neck pain 2019    Cervical disc disorder with radiculopathy of mid-cervical region 2019    Long-term current use of opiate analgesic     Uncomplicated opioid dependence ([facility] [address] ) 2019    Chronic pain syndrome 2019    Lumbar spondylosis 2019      LOS (days): 3  Geometric Mean LOS (GMLOS) (days): 3 30  Days to GMLOS:0 3     OBJECTIVE:  Risk of Unplanned Readmission Score: 12 19         Current admission status: Inpatient   Preferred Pharmacy:   [address] [facility] #[facility] - [address] [facility] 2  [address] [facility] 708 [facility] [address]  Phone: [phone] Fax: [phone]    Primary Care Provider: Daron Torres DO    Primary Insurance: [facility] HOSPITAL REP  Secondary Insurance: [address] DETAILS:    Pt [facility]  They're the only accepting location at this time   CM will task for auth and submit tonight or tomorrow

## 2022-06-30 NOTE — OCCUPATIONAL THERAPY NOTE
Occupational Therapy Treatment Note:      06/30/22 1500   Note Type   Cancel Reasons Patient off floor/test   Imani Dean

## 2022-06-30 NOTE — ANESTHESIA POSTPROCEDURE EVALUATION
Post-Op Assessment Note    CV Status:  Stable    Pain management: adequate     Mental Status:  Sleepy   Hydration Status:  Euvolemic   PONV Controlled:  Controlled   Airway Patency:  Patent      Post Op Vitals Reviewed: Yes      Staff: CRNA, Anesthesiologist         No complications documented      /56 (06/30/22 1426)    Temp (!) 96 8 °F (36 °C) (06/30/22 1426)    Pulse 60 (06/30/22 1426)   Resp 18 (06/30/22 1426)    SpO2 99 % (06/30/22 1426)

## 2022-06-30 NOTE — PROGRESS NOTES
[address] [facility]  Progress Note - Marston Schlatter 1950, 80 y o  male MRN: [id number]  Unit/Bed#: [facility] 603-01 Encounter: [id number]  Primary Care Provider: Eloisa Stover DO   Date and time admitted to hospital: 6/27/2022  5:41 PM    Acute blood loss anemia  Assessment & Plan  2/2 suspected upper GI bleed  Hgb stable at 10 5  Heparin for DVT prophylaxis; with GI consult reporting approval to continue for Heparin DVT prophylaxis  EGD scheduled, colonoscopy scheduled  GI following and appreciate continued recommendations  Will continue to trend H&H  Transfuse as necessary    Hemiplegia, post-stroke [facility])  Assessment & Plan  Chronic L sided hemiplegia, stable    Continue neuro checks    GIB (gastrointestinal bleeding)  Assessment & Plan  Presented with coffee ground emesis, suspected upper GI source  EGD to be performed tomorrow per GI  GI continues to follow, appreciate continued recommendations from GI  Continue Protonix 40 mg IV b i d  Trend H&H  Patient with bowel prep for colonoscopy; has scheduled EGD        * Subdural hematoma ([facility] [address] )  Assessment & Plan  S/p fall on Eliquis, received [facility]  TEG obtained on arrival to [facility] without coagulopathies  Second head CT noncontrast-impression denoting Stable CT appearance of the brain with acute left frontal parietal subdural resulting in only minimal mass effect  Several areas of contusion in the infarcted parenchyma within the posterior right hemisphere are stable "  Continue to DVT prophylaxis for EGD to be performed today  Subdural hemorrhage likely traumatics/p fall  Neurosurgery with no surgical intervention with d/c hot protocol  -Keppra seizure prophylaxis  -Continue to hold AC/AP  -PT OT evaluation and treatment  Continue to monitor neurological status with f/u ct if gcs drops points or greater; current GCS 15    Patient hemodynamically stable afebrile; patient with scheduled EGD colonoscopy    Patient currently taking GoLYTELY today; with GI recommendation of colonoscopy an EGD for patient hematemesis  Patient nursing staff denies any overnight issues with patient  Patient still reports some nausea after eating  Will continue to monitor results of EGD and colonoscopy  Patient denies new symptoms  Disposition: stable    SUBJECTIVE:  Chief Complaint: fall    Subjective: "I get sick after I eat "    OBJECTIVE:   Vitals:   Temp:  [98 6 °F (37 °C)-99 4 °F (37 4 °C)] 98 6 °F (37 °C)  HR:  [52-59] 59  Resp:  [16-17] 16  BP: (148-192)/(85-90) 169/90    Intake/Output:  I/O       06/28 0701  06/29 0700 06/29 0701  06/30 0700 06/30 0701  07/01 0700    P  O  0 222     Total Intake(mL/kg) 0 (0) 222 (3 7)     Urine (mL/kg/hr) 1450 (1) 1025 (0 7)     Stool  1     Total Output 1450 1026     Net -1450 -804            Unmeasured Urine Occurrence  2 x 1 x    Unmeasured Stool Occurrence   3 x    Unmeasured Emesis Occurrence   1 x         Nutrition: Diet NPO  GI Proph/Bowel Reg:  Currently with GoLYTELY- bowel prep for colonoscopy  VTE Prophylaxis:Heparin     Physical Exam:   GENERAL APPEARANCE:  Comfortable  NEURO:  Alert and oriented x3, no new/acute focal deficits  HEENT:  EOM intact, no pain on EOM, oropharynx clear and patent  CV:  Regular rate rhythm  LUNGS:  Clear to auscultation bilaterally  GI:  Soft nontender, no distension  :  Voiding  MSK:  Chronic left-sided hemiplegia, stable; moves all extremities; neurovascularly intact  SKIN: warm and well perfused    Invasive Devices  Report    Peripheral Intravenous Line  Duration           Peripheral IV 06/27/22 Distal;Dorsal (posterior); Left Forearm 3 days    Peripheral IV 06/27/22 Dorsal (posterior); Right Hand 3 days          Drain  Duration           External Urinary Catheter Medium 2 days                      Lab Results:   BMP/CMP:   Lab Results   Component Value Date    SODIUM 139 06/30/2022    K 3 6 06/30/2022     06/30/2022    CO2 30 06/30/2022    BUN 7 06/30/2022 CREATININE 0 71 06/30/2022    CALCIUM 8 9 06/30/2022    EGFR 95 06/30/2022    and CBC:   Lab Results   Component Value Date    WBC 6 15 06/30/2022    HGB 10 5 (L) 06/30/2022    HCT 32 3 (L) 06/30/2022    MCV 97 06/30/2022     06/30/2022    MCH 31 6 06/30/2022    MCHC 32 5 06/30/2022    RDW 14 3 06/30/2022    MPV 10 5 06/30/2022    NRBC 0 06/30/2022     Imaging/EKG Studies: I have personally reviewed pertinent reports       Other Studies: none

## 2022-06-30 NOTE — ASSESSMENT & PLAN NOTE
Presented with coffee ground emesis, suspected upper GI source  EGD to be performed tomorrow per GI  GI continues to follow, appreciate continued recommendations from GI  Continue Protonix 40 mg IV b i d    Trend H&H  Patient with bowel prep for colonoscopy; has scheduled EGD

## 2022-06-30 NOTE — ANESTHESIA PREPROCEDURE EVALUATION
Procedure:  EGD  COLONOSCOPY    Relevant Problems   CARDIO   (+) Essential hypertension      GI/HEPATIC   (+) GERD (gastroesophageal reflux disease)   (+) GIB (gastrointestinal bleeding)      HEMATOLOGY   (+) Acute blood loss anemia      MUSCULOSKELETAL   (+) Cervical spondylosis   (+) Chronic bilateral low back pain without sciatica   (+) Lumbar spondylosis      NEURO/PSYCH   (+) Chronic bilateral low back pain without sciatica   (+) Chronic pain syndrome   (+) Hemiplegia, post-stroke (HCC)   (+) Major depressive disorder   (+) Subdural hematoma [facility])      73-year-old male with AFib on Eliquis, hypertension, GERD, CVA admitted with subdural hematoma  EGD/colono for evaluation of coffee-ground emesis and vomiting (now resolved  )            · Neurosgy: Recommend SBP <160mmHg  No intervention for cva      Recent labs personally reviewed:        Lab Results   Component Value Date     WBC 6 15 06/30/2022     HGB 10 5 (L) 06/30/2022      06/30/2022            Lab Results   Component Value Date     K 3 6 06/30/2022     BUN 7 06/30/2022     CREATININE 0 71 06/30/2022            Lab Results   Component Value Date     PTT 27 06/27/2022            Lab Results   Component Value Date     INR 1 06 06/27/2022         No results found for: HGBA1C     Type and Screen: O                  Anesthesia Plan  ASA Score- 3     Anesthesia Type- IV sedation with anesthesia with ASA Monitors  Additional Monitors:   Airway Plan:     Comment: Supplemental O2, etco2 monitoring    Plan Factors-Exercise tolerance (METS): <4 METS  Chart reviewed  Existing labs reviewed  Patient summary reviewed  Patient is a current smoker  Patient not instructed to abstain from smoking on day of procedure  Patient did not smoke on day of surgery  Obstructive sleep apnea risk education given perioperatively  Induction- intravenous  Postoperative Plan-     Informed Consent- Anesthetic plan and risks discussed with patient    I personally reviewed this patient with the CRNA  Discussed and agreed on the Anesthesia Plan with the CRNA  Jackie Estrella

## 2022-06-30 NOTE — CASE MANAGEMENT
Case Management Discharge Planning Note    Patient name Kd Floyd  Location [address] [facility] [address] 603/PPHP 340-69 MRN [id number]  : 1950 Date 2022       Current Admission Date: 2022  Current Admission Diagnosis:Subdural hematoma [facility])   Patient Active Problem List    Diagnosis Date Noted    Acute blood loss anemia 2022    GIB (gastrointestinal bleeding) 2022    Hemiplegia, post-stroke ([facility] [address] ) 2022    Essential hypertension 2022    GERD (gastroesophageal reflux disease) 2022    Major depressive disorder 2022    Tobacco use 2022    Subdural hematoma ([facility] [address] ) 2022    Chronic bilateral low back pain without sciatica 2020    Cervical spinal stenosis 2019    Cervical spondylosis 2019    Cervical radiculopathy 2019    Neck pain 2019    Cervical disc disorder with radiculopathy of mid-cervical region 2019    Long-term current use of opiate analgesic     Uncomplicated opioid dependence ([facility] [address] ) 2019    Chronic pain syndrome 2019    Lumbar spondylosis 2019      LOS (days): 3  Geometric Mean LOS (GMLOS) (days): 2 90  Days to GMLOS:0 2     OBJECTIVE:  Risk of Unplanned Readmission Score: 12 13         Current admission status: Inpatient   Preferred Pharmacy:   [address] [facility]2 - [address] 2  [address] [facility] 708 [facility] [address]  Phone: [phone] Fax: [phone]    Primary Care Provider: Fausto Sesay DO    Primary Insurance: Kenan Hanson [facility] HOSPITAL REP  Secondary Insurance: [address] DETAILS:    CM spoke to Brad Montoya [phone] of [place] [facility]  She would like notification of when the pt is leaving the hospital  She plans to pass the pt's case along to Dylan Diadema 1903 when appropriate  Cm will continue to assist with d/c planning  Currently no accepting SNF  CM placed new referrals

## 2022-07-01 PROBLEM — E87.6 HYPOKALEMIA: Status: ACTIVE | Noted: 2022-07-01

## 2022-07-01 LAB
ANION GAP SERPL CALCULATED.3IONS-SCNC: 12 MMOL/L (ref 4–13)
ANION GAP SERPL CALCULATED.3IONS-SCNC: 7 MMOL/L (ref 4–13)
BASOPHILS # BLD AUTO: 0.01 THOUSANDS/ΜL (ref 0–0.1)
BASOPHILS NFR BLD AUTO: 0 % (ref 0–1)
BUN SERPL-MCNC: 6 MG/DL (ref 5–25)
BUN SERPL-MCNC: 8 MG/DL (ref 5–25)
CALCIUM SERPL-MCNC: 8.7 MG/DL (ref 8.3–10.1)
CALCIUM SERPL-MCNC: 9 MG/DL (ref 8.3–10.1)
CHLORIDE SERPL-SCNC: 93 MMOL/L (ref 100–108)
CHLORIDE SERPL-SCNC: 98 MMOL/L (ref 100–108)
CO2 SERPL-SCNC: 26 MMOL/L (ref 21–32)
CO2 SERPL-SCNC: 28 MMOL/L (ref 21–32)
CREAT SERPL-MCNC: 0.44 MG/DL (ref 0.6–1.3)
CREAT SERPL-MCNC: 0.64 MG/DL (ref 0.6–1.3)
EOSINOPHIL # BLD AUTO: 0 THOUSAND/ΜL (ref 0–0.61)
EOSINOPHIL NFR BLD AUTO: 0 % (ref 0–6)
ERYTHROCYTE [DISTWIDTH] IN BLOOD BY AUTOMATED COUNT: 14.4 % (ref 11.6–15.1)
FLUAV RNA RESP QL NAA+PROBE: NEGATIVE
FLUBV RNA RESP QL NAA+PROBE: NEGATIVE
GFR SERPL CREATININE-BSD FRML MDRD: 115 ML/MIN/1.73SQ M
GFR SERPL CREATININE-BSD FRML MDRD: 99 ML/MIN/1.73SQ M
GLUCOSE SERPL-MCNC: 111 MG/DL (ref 65–140)
GLUCOSE SERPL-MCNC: 139 MG/DL (ref 65–140)
HCT VFR BLD AUTO: 35.3 % (ref 36.5–49.3)
HGB BLD-MCNC: 11.7 G/DL (ref 12–17)
IMM GRANULOCYTES # BLD AUTO: 0.02 THOUSAND/UL (ref 0–0.2)
IMM GRANULOCYTES NFR BLD AUTO: 0 % (ref 0–2)
LYMPHOCYTES # BLD AUTO: 0.63 THOUSANDS/ΜL (ref 0.6–4.47)
LYMPHOCYTES NFR BLD AUTO: 8 % (ref 14–44)
MAGNESIUM SERPL-MCNC: 2.5 MG/DL (ref 1.6–2.6)
MCH RBC QN AUTO: 31 PG (ref 26.8–34.3)
MCHC RBC AUTO-ENTMCNC: 33.1 G/DL (ref 31.4–37.4)
MCV RBC AUTO: 94 FL (ref 82–98)
MONOCYTES # BLD AUTO: 0.23 THOUSAND/ΜL (ref 0.17–1.22)
MONOCYTES NFR BLD AUTO: 3 % (ref 4–12)
NEUTROPHILS # BLD AUTO: 7.19 THOUSANDS/ΜL (ref 1.85–7.62)
NEUTS SEG NFR BLD AUTO: 89 % (ref 43–75)
NRBC BLD AUTO-RTO: 0 /100 WBCS
PLATELET # BLD AUTO: 226 THOUSANDS/UL (ref 149–390)
PMV BLD AUTO: 9.9 FL (ref 8.9–12.7)
POTASSIUM SERPL-SCNC: 2.8 MMOL/L (ref 3.5–5.3)
POTASSIUM SERPL-SCNC: 3.5 MMOL/L (ref 3.5–5.3)
RBC # BLD AUTO: 3.77 MILLION/UL (ref 3.88–5.62)
RSV RNA RESP QL NAA+PROBE: NEGATIVE
SARS-COV-2 RNA RESP QL NAA+PROBE: NEGATIVE
SODIUM SERPL-SCNC: 131 MMOL/L (ref 136–145)
SODIUM SERPL-SCNC: 133 MMOL/L (ref 136–145)
WBC # BLD AUTO: 8.08 THOUSAND/UL (ref 4.31–10.16)

## 2022-07-01 PROCEDURE — 99232 SBSQ HOSP IP/OBS MODERATE 35: CPT | Performed by: STUDENT IN AN ORGANIZED HEALTH CARE EDUCATION/TRAINING PROGRAM

## 2022-07-01 PROCEDURE — 97535 SELF CARE MNGMENT TRAINING: CPT

## 2022-07-01 PROCEDURE — 85025 COMPLETE CBC W/AUTO DIFF WBC: CPT | Performed by: PHYSICIAN ASSISTANT

## 2022-07-01 PROCEDURE — 97530 THERAPEUTIC ACTIVITIES: CPT

## 2022-07-01 PROCEDURE — 80048 BASIC METABOLIC PNL TOTAL CA: CPT | Performed by: PHYSICIAN ASSISTANT

## 2022-07-01 PROCEDURE — 99232 SBSQ HOSP IP/OBS MODERATE 35: CPT | Performed by: INTERNAL MEDICINE

## 2022-07-01 PROCEDURE — 97116 GAIT TRAINING THERAPY: CPT

## 2022-07-01 PROCEDURE — 83735 ASSAY OF MAGNESIUM: CPT | Performed by: STUDENT IN AN ORGANIZED HEALTH CARE EDUCATION/TRAINING PROGRAM

## 2022-07-01 PROCEDURE — 97112 NEUROMUSCULAR REEDUCATION: CPT

## 2022-07-01 PROCEDURE — 80048 BASIC METABOLIC PNL TOTAL CA: CPT | Performed by: STUDENT IN AN ORGANIZED HEALTH CARE EDUCATION/TRAINING PROGRAM

## 2022-07-01 PROCEDURE — 0241U HB NFCT DS VIR RESP RNA 4 TRGT: CPT | Performed by: PHYSICIAN ASSISTANT

## 2022-07-01 RX ORDER — HYDRALAZINE HYDROCHLORIDE 20 MG/ML
5 INJECTION INTRAMUSCULAR; INTRAVENOUS ONCE
Status: COMPLETED | OUTPATIENT
Start: 2022-07-01 | End: 2022-07-01

## 2022-07-01 RX ORDER — POTASSIUM CHLORIDE 20MEQ/15ML
20 LIQUID (ML) ORAL ONCE
Status: COMPLETED | OUTPATIENT
Start: 2022-07-01 | End: 2022-07-01

## 2022-07-01 RX ORDER — POTASSIUM CHLORIDE 14.9 MG/ML
20 INJECTION INTRAVENOUS
Status: COMPLETED | OUTPATIENT
Start: 2022-07-01 | End: 2022-07-01

## 2022-07-01 RX ORDER — AMOXICILLIN 250 MG
1 CAPSULE ORAL DAILY
Status: DISCONTINUED | OUTPATIENT
Start: 2022-07-01 | End: 2022-07-04 | Stop reason: HOSPADM

## 2022-07-01 RX ORDER — SODIUM CHLORIDE, SODIUM GLUCONATE, SODIUM ACETATE, POTASSIUM CHLORIDE, MAGNESIUM CHLORIDE, SODIUM PHOSPHATE, DIBASIC, AND POTASSIUM PHOSPHATE .53; .5; .37; .037; .03; .012; .00082 G/100ML; G/100ML; G/100ML; G/100ML; G/100ML; G/100ML; G/100ML
500 INJECTION, SOLUTION INTRAVENOUS ONCE
Status: COMPLETED | OUTPATIENT
Start: 2022-07-01 | End: 2022-07-01

## 2022-07-01 RX ORDER — MAGNESIUM SULFATE 1 G/100ML
1 INJECTION INTRAVENOUS ONCE
Status: COMPLETED | OUTPATIENT
Start: 2022-07-01 | End: 2022-07-01

## 2022-07-01 RX ORDER — HYDRALAZINE HYDROCHLORIDE 20 MG/ML
5 INJECTION INTRAMUSCULAR; INTRAVENOUS EVERY 6 HOURS PRN
Status: DISCONTINUED | OUTPATIENT
Start: 2022-07-01 | End: 2022-07-02

## 2022-07-01 RX ADMIN — LEVETIRACETAM 500 MG: 500 TABLET, FILM COATED ORAL at 08:35

## 2022-07-01 RX ADMIN — SODIUM CHLORIDE, SODIUM GLUCONATE, SODIUM ACETATE, POTASSIUM CHLORIDE, MAGNESIUM CHLORIDE, SODIUM PHOSPHATE, DIBASIC, AND POTASSIUM PHOSPHATE 100 ML/HR: .53; .5; .37; .037; .03; .012; .00082 INJECTION, SOLUTION INTRAVENOUS at 09:39

## 2022-07-01 RX ADMIN — HYDRALAZINE HYDROCHLORIDE 5 MG: 20 INJECTION, SOLUTION INTRAMUSCULAR; INTRAVENOUS at 12:57

## 2022-07-01 RX ADMIN — OXYCODONE HYDROCHLORIDE 5 MG: 5 TABLET ORAL at 13:45

## 2022-07-01 RX ADMIN — METOPROLOL SUCCINATE 100 MG: 100 TABLET, EXTENDED RELEASE ORAL at 08:35

## 2022-07-01 RX ADMIN — NICOTINE 1 PATCH: 14 PATCH, EXTENDED RELEASE TRANSDERMAL at 08:36

## 2022-07-01 RX ADMIN — ONDANSETRON 4 MG: 2 INJECTION INTRAMUSCULAR; INTRAVENOUS at 04:57

## 2022-07-01 RX ADMIN — GABAPENTIN 300 MG: 300 CAPSULE ORAL at 08:35

## 2022-07-01 RX ADMIN — OXYCODONE HYDROCHLORIDE 5 MG: 5 TABLET ORAL at 03:06

## 2022-07-01 RX ADMIN — HEPARIN SODIUM 5000 UNITS: 5000 INJECTION INTRAVENOUS; SUBCUTANEOUS at 13:44

## 2022-07-01 RX ADMIN — HYDRALAZINE HYDROCHLORIDE 5 MG: 20 INJECTION INTRAMUSCULAR; INTRAVENOUS at 02:26

## 2022-07-01 RX ADMIN — POTASSIUM CHLORIDE 20 MEQ: 14.9 INJECTION, SOLUTION INTRAVENOUS at 12:49

## 2022-07-01 RX ADMIN — OXYCODONE HYDROCHLORIDE 5 MG: 5 TABLET ORAL at 08:35

## 2022-07-01 RX ADMIN — HYDRALAZINE HYDROCHLORIDE 5 MG: 20 INJECTION, SOLUTION INTRAMUSCULAR; INTRAVENOUS at 00:24

## 2022-07-01 RX ADMIN — MAGNESIUM SULFATE HEPTAHYDRATE 1 G: 1 INJECTION, SOLUTION INTRAVENOUS at 13:02

## 2022-07-01 RX ADMIN — POTASSIUM CHLORIDE 20 MEQ: 14.9 INJECTION, SOLUTION INTRAVENOUS at 11:03

## 2022-07-01 RX ADMIN — SENNOSIDES AND DOCUSATE SODIUM 1 TABLET: 50; 8.6 TABLET ORAL at 12:50

## 2022-07-01 RX ADMIN — HYDROMORPHONE HYDROCHLORIDE 0.2 MG: 0.2 INJECTION, SOLUTION INTRAMUSCULAR; INTRAVENOUS; SUBCUTANEOUS at 15:47

## 2022-07-01 RX ADMIN — POTASSIUM CHLORIDE 20 MEQ: 20 SOLUTION ORAL at 11:04

## 2022-07-01 RX ADMIN — DULOXETINE HYDROCHLORIDE 60 MG: 60 CAPSULE, DELAYED RELEASE ORAL at 08:35

## 2022-07-01 RX ADMIN — HYDROMORPHONE HYDROCHLORIDE 0.2 MG: 0.2 INJECTION, SOLUTION INTRAMUSCULAR; INTRAVENOUS; SUBCUTANEOUS at 00:06

## 2022-07-01 RX ADMIN — PANTOPRAZOLE SODIUM 40 MG: 40 TABLET, DELAYED RELEASE ORAL at 05:13

## 2022-07-01 RX ADMIN — HYDROXYZINE HYDROCHLORIDE 25 MG: 25 TABLET ORAL at 03:06

## 2022-07-01 RX ADMIN — OXYCODONE HYDROCHLORIDE 5 MG: 5 TABLET ORAL at 20:21

## 2022-07-01 RX ADMIN — ACETAMINOPHEN 975 MG: 325 TABLET, FILM COATED ORAL at 03:05

## 2022-07-01 RX ADMIN — SODIUM CHLORIDE, SODIUM GLUCONATE, SODIUM ACETATE, POTASSIUM CHLORIDE, MAGNESIUM CHLORIDE, SODIUM PHOSPHATE, DIBASIC, AND POTASSIUM PHOSPHATE 500 ML: .53; .5; .37; .037; .03; .012; .00082 INJECTION, SOLUTION INTRAVENOUS at 11:59

## 2022-07-01 RX ADMIN — LEVETIRACETAM 500 MG: 500 TABLET, FILM COATED ORAL at 20:21

## 2022-07-01 RX ADMIN — ATORVASTATIN CALCIUM 40 MG: 40 TABLET, FILM COATED ORAL at 08:35

## 2022-07-01 RX ADMIN — HEPARIN SODIUM 5000 UNITS: 5000 INJECTION INTRAVENOUS; SUBCUTANEOUS at 05:13

## 2022-07-01 NOTE — CASE MANAGEMENT
Case Management Discharge Planning Note    Patient name Apollo Conway  Location [address] [facility] [address] 603/PPHP 025-10 MRN [id number]  : 1950 Date 2022       Current Admission Date: 2022  Current Admission Diagnosis:Subdural hematoma [facility])   Patient Active Problem List    Diagnosis Date Noted    Acute blood loss anemia 2022    GIB (gastrointestinal bleeding) 2022    Hemiplegia, post-stroke ([facility] [address] ) 2022    Essential hypertension 2022    GERD (gastroesophageal reflux disease) 2022    Major depressive disorder 2022    Tobacco use 2022    Subdural hematoma ([facility] [address] ) 2022    Chronic bilateral low back pain without sciatica 2020    Cervical spinal stenosis 2019    Cervical spondylosis 2019    Cervical radiculopathy 2019    Neck pain 2019    Cervical disc disorder with radiculopathy of mid-cervical region 2019    Long-term current use of opiate analgesic     Uncomplicated opioid dependence ([address] ) 2019    Chronic pain syndrome 2019    Lumbar spondylosis 2019      LOS (days): 4  Geometric Mean LOS (GMLOS) (days): 3 30  Days to GMLOS:-0 3     OBJECTIVE:  Risk of Unplanned Readmission Score: 12 73         Current admission status: Inpatient   Preferred Pharmacy:   [address] [employer] #2 - [address] - [address] [facility] [address]  [address] [facility] 708 [facility] [address]  Phone: [phone] Fax: [phone]    Primary Care Provider: Sharee Pringle DO    Primary Insurance: Catherine Mendoza [facility] REP  Secondary Insurance: [address] Number: Pending [address] for patient to be transferred to [facility] (TY#[id number])  Request with clinicals submitted via fax to [phone]

## 2022-07-01 NOTE — PHYSICAL THERAPY NOTE
Physical Therapy Progress Note     07/01/22 1024   Note Type   Note Type Treatment for insurance authorization   Pain Assessment   Pain Assessment Tool 0-10   Pain Score 10 - Worst Possible Pain   Pain Location/Orientation Location: Back; Location: Hip;Orientation: Left   Hospital Pain Intervention(s) Cold applied;Repositioned; Ambulation/increased activity; Elevated; Rest   Restrictions/Precautions   Other Precautions Chair Alarm; Bed Alarm;Cognitive; Fall Risk;Pain;Multiple lines   Subjective   Subjective Pt encountered supine in bed, reporting increased back pain & headache today  Also had 1 bout of nausea with dry heaving & minimal vomiting/spitting at completion of ambulation  Pt also reports L hip feels "like it's coming out of it's socket "  Pain & symptoms resided once sitting & pt appeared more comfortable in supine  RN informed of pt complaints  Bed Mobility   Supine to Sit 3  Moderate assistance   Additional items Assist x 1   Sit to Supine 3  Moderate assistance   Additional items Assist x 1;LE management   Transfers   Sit to Stand 3  Moderate assistance   Additional items Assist x 1; Armrests; Increased time required   Stand to Sit 3  Moderate assistance   Additional items Assist x 1; Armrests; Increased time required   Ambulation/Elevation   Gait pattern L Knee Veronica; Poor UE support; Improper Weight shift;Narrow ANA; Decreased foot clearance;Scissoring; Inconsistent bee; Foward flexed; Step to;Excessively slow   Gait Assistance 2  Maximal assist   Additional items Assist x 1  (+ standby for line management/additional assist during gait when turning)   Assistive Device Large base quad cane   Distance 15' bed<-->door   Balance   Static Sitting Fair   Dynamic Sitting Poor +   Static Standing Poor   Ambulatory Poor -   Activity Tolerance   Activity Tolerance Patient limited by pain; Patient limited by fatigue   Nurse Ashley Napier RN   Assessment   Prognosis Fair   Problem List Decreased strength;Decreased endurance; Impaired balance;Decreased mobility; Decreased cognition;Decreased safety awareness;Pain   Assessment Pt demonstrated improvement in overall mobility this session, performing all transfers with decreased assist comapred to eval   Pt primarily limited by increased pain today that negatively impacted LE strength, balance & endurance  Pt initially required mod A to ambulate, but posture & LE strength began to degrade & pt requried increased assist for balance prior to sitting back on EOB  Once sitting, pt was eventually able to perform R lateral scooting with appropriate offloading without significant assist   Once there, he returned to supine with assist primarily for RLE management  pt remains appropriate for rehab to improve strength, endurance, balance, & ensure pt able to more consistantly perform all functional tasks to reduce risk for falls or further injury  Barriers to Discharge Decreased caregiver support; Inaccessible home environment   Goals   Patient Goals to get better & go home soon   STG Expiration Date 07/12/22   PT Treatment Day 1   Plan   Treatment/Interventions Functional transfer training;LE strengthening/ROM; Elevations; Therapeutic exercise; Endurance training;Patient/family training;Equipment eval/education; Bed mobility;Gait training   Progress Progressing toward goals   PT Frequency 3-5x/wk   Recommendation   PT Discharge Recommendation Post acute rehabilitation services   Equipment Recommended   (continue to assess LRAD)   AM-PAC Basic Mobility Inpatient   Turning in Bed Without Bedrails 3   Lying on Back to Sitting on Edge of Flat Bed 2   Moving Bed to Chair 2   Standing Up From Chair 2   Walk in Room 2   Climb 3-5 Stairs 1   Basic Mobility Inpatient Raw Score 12   Basic Mobility Standardized Score 32 23   Highest Level Of Mobility   JH-HLM Goal 4: Move to chair/commode   JH-HLM Achieved 6: Walk 10 steps or more     Raúl Hargrove PTA    An [facility] Basic Mobility Standardized Score of 40 78 suggests pt would benefit from post acute rehab

## 2022-07-01 NOTE — CASE MANAGEMENT
Case Management Discharge Planning Note    Patient name Ryan Tyson  Location [address] [facility] [address] 603/PPHP 878-22 MRN [id number]  : 1950 Date 2022       Current Admission Date: 2022  Current Admission Diagnosis:Subdural hematoma [facility])   Patient Active Problem List    Diagnosis Date Noted    Hypokalemia 2022    Acute blood loss anemia 2022    GIB (gastrointestinal bleeding) 2022    Hemiplegia, post-stroke ([address] ) 2022    Essential hypertension 2022    GERD (gastroesophageal reflux disease) 2022    Major depressive disorder 2022    Tobacco use 2022    Subdural hematoma ([address] ) 2022    Chronic bilateral low back pain without sciatica 2020    Cervical spinal stenosis 2019    Cervical spondylosis 2019    Cervical radiculopathy 2019    Neck pain 2019    Cervical disc disorder with radiculopathy of mid-cervical region 2019    Long-term current use of opiate analgesic     Uncomplicated opioid dependence ([address] ) 2019    Chronic pain syndrome 2019    Lumbar spondylosis 2019      LOS (days): 4  Geometric Mean LOS (GMLOS) (days): 3 30  Days to GMLOS:-0 5     OBJECTIVE:  Risk of Unplanned Readmission Score: 13 38         Current admission status: Inpatient   Preferred Pharmacy:   [address] [facility] - [address] 2  [address] [facility] [address] [place] [address]  Phone: [phone] Fax: [phone]    Primary Care Provider: Zi Thayer DO    Primary Insurance: [employer] [address] REP  Secondary Insurance: Marilin Cassidy Number: [address] [id number] Effective 22 for patient to be transferred to [facility] two buissness days post admission to [facility] - Updated Clinicals can be faxed to [phone]

## 2022-07-01 NOTE — PROGRESS NOTES
Progress Note - Geriatric Medicine   Jesús Reeves 80 y o  male MRN: [id number]  Unit/Bed#: [facility] 603-01 Encounter: [id number]      Assessment/Plan:    Ambulatory dysfunction with fall  -injuries as below  -remains high risk recurrent falls continue to address modifiable risk factors   -encourage good body mechanics and assist w tx  -consider o/p fall risk reduction program such as matter of balance or silver sneakers following recovery from acute injuries    Hematemesis   -underwent EGD and C-scope yesterday  -continue management per GI    Hyponatremia  -Na low at 131 from 136 earlier in admission  -likely multifactorial including poor oral intake, GI losses and was NPO for procedure yesterday  -avoid rapid and drastic fluctuations in sodium  -monitor very closely with use of Cymbalta which can further contribute to hyponatremia   -encourage nutritional intake    Hypokalemia   -potassium very low at 2 8, likely due to GI loss, recommend repletion     Hypertensive urgency  -BP elevated to 222/98 overnight  -multifactorial including pain and stress of procedure earlier in day  -continue to optimize hemodynamics anny in setting of SDH and hemorrhagic contusions following recent fall    Left subdural hematoma  -s/p fall PTA  -reported on [facility] 6/27/22 reportedly stable CT 6/28/22  -home Eliquis reversed with Kcentra on initial presentation and systemic anticoagulation remains on hold  -neurochecks per protocol   -Nsx following    Right hemispheric hemorrhagic contusion  -plan as above    Left hemiparesis  -late effect of remote CVA  -continue secondary risk factor modifications    Depression  -stable on home Cymbalta regimen  -continue psychosocial supports and cares    High risk of developing delirium   -continue supportive cares  -maintain normal circadian rhythm  -reorient frequently as appropriately  -monitor for fecal and urinary retention    Care coordination:  Rounded with Nando (RN)    Subjective:     Patient seen and examined at bedside, he underwent EGD and C-scope yesterday after which nursing reports he was relatively ill with abdominal pain, nausea and repeated episodes of vomiting  He he continues to feel generally unwell this morning  Review of Systems   Constitutional: Negative  Negative for chills and fever  HENT: Negative  Eyes: Negative  Respiratory: Negative  Negative for shortness of breath  Cardiovascular: Negative  Gastrointestinal: Positive for nausea  Negative for abdominal pain  Endocrine: Negative  Genitourinary: Negative  Musculoskeletal: Negative  Skin: Negative  Neurological: Negative for light-headedness and numbness  Hematological: Negative  Psychiatric/Behavioral: Negative  Negative for sleep disturbance  All other systems reviewed and are negative  Objective:     Vitals: Blood pressure 168/98, pulse 57, temperature 98 2 °F (36 8 °C), resp  rate 19, height 5' 10" (1 778 m), weight 60 5 kg (133 lb 6 1 oz), SpO2 95 %  ,Body mass index is 19 14 kg/m²  Intake/Output Summary (Last 24 hours) at 7/1/2022 0739  Last data filed at 7/1/2022 0500  Gross per 24 hour   Intake 150 ml   Output 750 ml   Net -600 ml     Current Medications: Reviewed    Physical Exam:   Physical Exam  Vitals and nursing note reviewed  Constitutional:       General: He is not in acute distress  Comments: Thin frail elderly male   HENT:      Head: Normocephalic  Nose: Nose normal       Mouth/Throat:      Mouth: Mucous membranes are dry  Eyes:      General:         Right eye: No discharge  Left eye: No discharge  Neck:      Comments: Phonation normal  Cardiovascular:      Rate and Rhythm: Regular rhythm  Bradycardia present  Pulses: Normal pulses  Pulmonary:      Effort: Pulmonary effort is normal  No respiratory distress  Breath sounds: No wheezing  Comments: Saturating well on room air  Abdominal:      General: There is no distension  Palpations: Abdomen is soft  Tenderness: There is no abdominal tenderness  Musculoskeletal:      Cervical back: Neck supple  Comments: Diffuse severe subcutaneous fat muscle wasting   Skin:     General: Skin is warm and dry  Neurological:      Mental Status: He is alert  Comments: Sleeping but awakens to voice, answers questions appropriately and follows commands   Psychiatric:      Comments: Flat and mildly withdrawn        Invasive Devices  Report    Peripheral Intravenous Line  Duration           Peripheral IV 06/27/22 Distal;Dorsal (posterior); Left Forearm 3 days          Drain  Duration           External Urinary Catheter Medium 3 days              Lab, Imaging and other studies: I have personally reviewed pertinent reports

## 2022-07-01 NOTE — PROGRESS NOTES
[address] [facility]  Progress Note - Joe Ugo 1950, 80 y o  male MRN: [id number]  Unit/Bed#: [facility] 603-01 Encounter: [id number]  Primary Care Provider: Alex Schneider,    Date and time admitted to hospital: 6/27/2022  5:41 PM    Hypokalemia  Assessment & Plan  Potassium 2 8  Likely secondary to decreased p o  Intake; accompanying hyponatremia and hypocalcemia, also status post bowel prep for colonoscopy  IV and p o  Potassium repletion  Continue to trend potassium  Serum creatinine 0 44    Acute blood loss anemia  Assessment & Plan  2/2 suspected upper GI bleed  Hgb stable at 11 7  Heparin for DVT prophylaxis; with GI consult reporting approval to continue for Heparin DVT prophylaxis  EGD scheduled, colonoscopy scheduled  GI following and appreciate continued recommendations  Will continue to trend H&H  Transfuse as necessary    Hemiplegia, post-stroke [facility])  Assessment & Plan  Chronic L sided hemiplegia, stable    Continue neuro checks    GIB (gastrointestinal bleeding)  Assessment & Plan  Presented with coffee ground emesis, suspected upper GI source  EGD to be performed tomorrow per GI  GI continues to follow, appreciate continued recommendations from GI  Continue Protonix 40 mg IV b i d  Trend H&H  EGD with Wise's esophagus; celiac and h  Pylori tissue biopsy in process          * Subdural hematoma (HCC)  Assessment & Plan  S/p fall on Eliquis, received [facility]  TEG obtained on arrival to [facility]"" without coagulopathies  Second head CT noncontrast-impression denoting Stable CT appearance of the brain with acute left frontal parietal subdural resulting in only minimal mass effect    Several areas of contusion in the infarcted parenchyma within the posterior right hemisphere are stable "  Continue to DVT prophylaxis for EGD to be performed today  Subdural hemorrhage likely traumatics/p fall  Neurosurgery with no surgical intervention with d/c hot protocol  -Keppra seizure prophylaxis  -Continue to hold AC/AP  -PT OT evaluation and treatment  Continue to monitor neurological status with f/u ct if gcs drops points or greater; current GCS 15    Patient with no significant changes or events overnight  Patient denies new symptoms  Patient with hypokalemia with potassium repletion p o  An IV along with magnesium; will continue to trend electrolytes as hypokalemia and hypernatremia/hyperchloremia likely secondary to hypovolemia given recent bowel regimen for colonoscopy  Patient alert and oriented x3  No new symptoms  Disposition: stable and pending placement     SUBJECTIVE:  Chief Complaint: fall    Subjective: "I am feeling better and ready to leave "    OBJECTIVE:   Vitals:   Temp:  [96 8 °F (36 °C)-98 4 °F (36 9 °C)] 98 4 °F (36 9 °C)  HR:  [5-73] 73  Resp:  [16-20] 18  BP: (102-222)/() 172/82    Intake/Output:  I/O       06/29 0701  06/30 0700 06/30 0701  07/01 0700 07/01 0701  07/02 0700    P  O  222 0     IV Piggyback  150     Total Intake(mL/kg) 222 (3 7) 150 (2 5)     Urine (mL/kg/hr) 1025 (0 7) 750 (0 5)     Emesis/NG output  0     Stool 1      Total Output 1026 750     Net -804 -600            Unmeasured Urine Occurrence 2 x 2 x     Unmeasured Stool Occurrence  3 x     Unmeasured Emesis Occurrence  2 x          Nutrition: Diet Regular; Regular House  GI Proph/Bowel Reg: senokot  VTE Prophylaxis:Heparin     Physical Exam:   GENERAL APPEARANCE: comfortable  NEURO:  Chronic left-sided hemiplegia, no new neurological focal weakness; alert oriented x3  HEENT:  EOM intact, no pain on EOM, oropharynx clear and patent  CV:  Regular rate rhythm  LUNGS:  Clear to auscultation bilaterally  GI:  Soft nontender, no distension  :  External urinary catheter  MSK:  Left-sided hemiplegia, moves all extremities  SKIN:  Warm and well perfused      Invasive Devices  Report    Peripheral Intravenous Line  Duration           Peripheral IV 07/01/22 Right;Ventral (anterior) Forearm <1 day          Drain  Duration           External Urinary Catheter Medium 3 days                      Lab Results:   BMP/CMP:   Lab Results   Component Value Date    SODIUM 131 (L) 07/01/2022    K 2 8 (L) 07/01/2022    CL 93 (L) 07/01/2022    CO2 26 07/01/2022    BUN 6 07/01/2022    CREATININE 0 44 (L) 07/01/2022    CALCIUM 8 7 07/01/2022    EGFR 115 07/01/2022    and CBC:   Lab Results   Component Value Date    WBC 8 08 07/01/2022    HGB 11 7 (L) 07/01/2022    HCT 35 3 (L) 07/01/2022    MCV 94 07/01/2022     07/01/2022    MCH 31 0 07/01/2022    MCHC 33 1 07/01/2022    RDW 14 4 07/01/2022    MPV 9 9 07/01/2022    NRBC 0 07/01/2022     Imaging/EKG Studies: I have personally reviewed pertinent reports       Other Studies: none

## 2022-07-01 NOTE — CASE MANAGEMENT
Case Management Discharge Planning Note    Patient name Prakash Adame  Location [address] [facility] [address] 603/PPHP 404-92 MRN [id number]  : 1950 Date 2022       Current Admission Date: 2022  Current Admission Diagnosis:Subdural hematoma [facility])   Patient Active Problem List    Diagnosis Date Noted    Hypokalemia 2022    Acute blood loss anemia 2022    GIB (gastrointestinal bleeding) 2022    Hemiplegia, post-stroke ([address] ) 2022    Essential hypertension 2022    GERD (gastroesophageal reflux disease) 2022    Major depressive disorder 2022    Tobacco use 2022    Subdural hematoma ([address] ) 2022    Chronic bilateral low back pain without sciatica 2020    Cervical spinal stenosis 2019    Cervical spondylosis 2019    Cervical radiculopathy 2019    Neck pain 2019    Cervical disc disorder with radiculopathy of mid-cervical region 2019    Long-term current use of opiate analgesic     Uncomplicated opioid dependence ([address] ) 2019    Chronic pain syndrome 2019    Lumbar spondylosis 2019      LOS (days): 4  Geometric Mean LOS (GMLOS) (days): 3 30  Days to GMLOS:-0 6     OBJECTIVE:  Risk of Unplanned Readmission Score: 13 38         Current admission status: Inpatient   Preferred Pharmacy:   [address] [facility] #[facility] - [address] [facility] [address]  [address] [facility] [address] [facility] [address]  Phone: [phone] Fax: [phone]    Primary Care Provider: Javon Ling DO    Primary Insurance: Vilinda Bernheim [address] [facility]  Secondary Insurance: Bruna Melendrez    DISCHARGE DETAILS:    Auth obtained  CM sent for transport today but pt needs to leave by , if not pt will need to d/c tomorrow

## 2022-07-01 NOTE — ASSESSMENT & PLAN NOTE
Presented with coffee ground emesis, suspected upper GI source  EGD to be performed tomorrow per GI  GI continues to follow, appreciate continued recommendations from GI  Continue Protonix 40 mg IV b i d  Trend H&H  EGD with Wise's esophagus; celiac and h   Pylori tissue biopsy in process

## 2022-07-01 NOTE — ASSESSMENT & PLAN NOTE
S/p fall on Eliquis, received [address]  TEG obtained on arrival to [facility] without coagulopathies  Second head CT noncontrast-impression denoting Stable CT appearance of the brain with acute left frontal parietal subdural resulting in only minimal mass effect    Several areas of contusion in the infarcted parenchyma within the posterior right hemisphere are stable "  Continue to DVT prophylaxis for EGD to be performed today  Subdural hemorrhage likely traumatics/p fall  Neurosurgery with no surgical intervention with d/c hot protocol  -Keppra seizure prophylaxis  -Continue to hold AC/AP  -PT OT evaluation and treatment  Continue to monitor neurological status with f/u ct if gcs drops points or greater; current GCS 15

## 2022-07-01 NOTE — ASSESSMENT & PLAN NOTE
Potassium 2 8  Likely secondary to decreased p o  Intake; accompanying hyponatremia and hypocalcemia, also status post bowel prep for colonoscopy  IV and p o   Potassium repletion  Continue to trend potassium  Serum creatinine 0 44

## 2022-07-01 NOTE — PLAN OF CARE
Problem: OCCUPATIONAL THERAPY ADULT  Goal: Performs self-care activities at highest level of function for planned discharge setting  See evaluation for individualized goals  Description: Treatment Interventions: ADL retraining, Visual perceptual retraining, Endurance training, Cognitive reorientation, Equipment evaluation/education, Compensatory technique education, Energy conservation, Activityengagement          See flowsheet documentation for full assessment, interventions and recommendations  Outcome: Progressing  Note: Limitation: Decreased ADL status, Decreased UE ROM, Decreased Safe judgement during ADL, Decreased cognition, Decreased endurance, Decreased self-care trans, Decreased high-level ADLs  Prognosis: Fair  Assessment: Pt seen for participation occupational therapy session with focus on activity tolerance, bed mob, and pt participation in LB/UB self-care,  pt cleared by RN/Annalee for pt participation in therapy session  Pt presented supine/HOB raised and pt agreeable to therapy following pt identifiers confirmed  Pt reported significant back pain however pain dissipated with repositioning  He required assistance for LB dressing both bed level and at edge of bed 2* to pt limited by pain  Pt required assistance for functional transfers/mob 2* to pt report of hip and back pain  Pt will require post acute rehab services to continue to address these above-noted pt deficits which currently impair pt's ADL and functional mob  Pt returned to bed post therapy session with bed alarm activated and all needs within reach  OT Discharge Recommendation: Post acute rehabilitation services  OT - OK to Discharge:  Yes

## 2022-07-01 NOTE — OCCUPATIONAL THERAPY NOTE
Occupational Therapy Treatment Note     07/01/22 1025   OT Last Visit   OT Visit Date 07/01/22   Note Type   Note Type Treatment for insurance authorization   Restrictions/Precautions   Weight Bearing Precautions Per Order No   Braces or Orthoses   (pt states he has L AFO, however does not wear)   Other Precautions Bed Alarm;Cognitive; Fall Risk;Visual impairment   Lifestyle   Autonomy Pt was I with toileting and functional mobility  Pt required assistance with dressing and bathing and IADLs from care giver  PT no longer drives due to visual impairment  Reciprocal Relationships Pt appears to be a poor historian  Pt lives with roommates  Pt reports he recieves assistance from a caregiver daily for 8 hrs  Service to Others Pt is retired  Pt previously was a carreon  Intrinsic Gratification Pt enjoys spending time with roommates  Pain Assessment   Pain Assessment Tool 0-10   Pain Score 10 - Worst Possible Pain   Pain Location/Orientation Location: Back   ADL   Where Assessed Edge of bed   Grooming Assistance 5  Supervision/Setup   Grooming Deficit Setup; Increased time to complete   Grooming Comments bed level and while edge of pt bed   LB Dressing Assistance 2  Maximal Assistance   LB Dressing Deficit Thread RLE into pants; Thread LLE into pants;Pull up over hips   Bed Mobility   Supine to Sit 3  Moderate assistance   Additional items Assist x 1   Sit to Supine 3  Moderate assistance   Additional items Assist x 1   Transfers   Sit to Stand 3  Moderate assistance   Additional items Assist x 1   Stand to Sit 3  Moderate assistance   Additional items Assist x 1   Functional Mobility   Functional Mobility 3  Moderate assistance   Additional items   (quad cane)   Cognition   Overall Cognitive Status Impaired   Arousal/Participation Cooperative   Attention Attends with cues to redirect   Orientation Level Oriented X4   Memory Decreased long term memory;Decreased short term memory   Following Commands Follows one step commands with increased time or repetition   Assessment   Assessment Pt seen for participation occupational therapy session with focus on activity tolerance, bed mob, and pt participation in LB/UB self-care,  pt cleared by MARY/Annalee for pt participation in therapy session  Pt presented supine/HOB raised and pt agreeable to therapy following pt identifiers confirmed  Pt reported significant back pain however pain dissipated with repositioning  He required assistance for LB dressing both bed level and at edge of bed 2* to pt limited by pain  Pt required assistance for functional transfers/mob 2* to pt report of hip and back pain  Pt will require post acute rehab services to continue to address these above-noted pt deficits which currently impair pt's ADL and functional mob  Pt returned to bed post therapy session with bed alarm activated and all needs within reach     Plan   Treatment Interventions ADL retraining;Functional transfer training   Goal Expiration Date 07/12/22   OT Treatment Day 1   OT Frequency 3-5x/wk   Recommendation   OT Discharge Recommendation Post acute rehabilitation services   AM-PAC Daily Activity Inpatient   Lower Body Dressing 2   Bathing 2   Toileting 2   Upper Body Dressing 2   Grooming 3   Eating 3   Daily Activity Raw Score 14   Daily Activity Standardized Score (Calc for Raw Score >=11) 33 39   AM-PAC Applied Cognition Inpatient   Following a Speech/Presentation 2   Understanding Ordinary Conversation 3   Taking Medications 3   Remembering Where Things Are Placed or Put Away 3   Remembering List of 4-5 Errands 2   Taking Care of Complicated Tasks 2   Applied Cognition Raw Score 15   Applied Cognition Standardized Score 33 54       Yessica GREENE/FRANKO

## 2022-07-01 NOTE — ASSESSMENT & PLAN NOTE
2/2 suspected upper GI bleed  Hgb stable at 11 7  Heparin for DVT prophylaxis; with GI consult reporting approval to continue for Heparin DVT prophylaxis  EGD scheduled, colonoscopy scheduled  GI following and appreciate continued recommendations    Will continue to trend H&H  Transfuse as necessary

## 2022-07-01 NOTE — PLAN OF CARE
Problem: PHYSICAL THERAPY ADULT  Goal: Performs mobility at highest level of function for planned discharge setting  See evaluation for individualized goals  Description: Treatment/Interventions: Functional transfer training, LE strengthening/ROM, Therapeutic exercise, Endurance training, Cognitive reorientation, Patient/family training, Elevations, Equipment eval/education, Bed mobility, Gait training          See flowsheet documentation for full assessment, interventions and recommendations  Outcome: Progressing  Note: Prognosis: Fair  Problem List: Decreased strength, Decreased endurance, Impaired balance, Decreased mobility, Decreased cognition, Decreased safety awareness, Pain  Assessment: Pt demonstrated improvement in overall mobility this session, performing all transfers with decreased assist comapred to eval   Pt primarily limited by increased pain today that negatively impacted LE strength, balance & endurance  Pt initially required mod A to ambulate, but posture & LE strength began to degrade & pt requried increased assist for balance prior to sitting back on EOB  Once sitting, pt was eventually able to perform R lateral scooting with appropriate offloading without significant assist   Once there, he returned to supine with assist primarily for RLE management  pt remains appropriate for rehab to improve strength, endurance, balance, & ensure pt able to more consistantly perform all functional tasks to reduce risk for falls or further injury  Barriers to Discharge: Decreased caregiver support, Inaccessible home environment        PT Discharge Recommendation: Post acute rehabilitation services          See flowsheet documentation for full assessment

## 2022-07-02 ENCOUNTER — APPOINTMENT (INPATIENT)
Dept: RADIOLOGY | Facility: HOSPITAL | Age: 72
DRG: 086 | End: 2022-07-02
Payer: COMMERCIAL

## 2022-07-02 PROBLEM — R11.2 NAUSEA AND VOMITING: Status: ACTIVE | Noted: 2022-07-02

## 2022-07-02 LAB
ALBUMIN SERPL BCP-MCNC: 3.5 G/DL (ref 3.5–5)
ALP SERPL-CCNC: 95 U/L (ref 46–116)
ALT SERPL W P-5'-P-CCNC: 14 U/L (ref 12–78)
ANION GAP SERPL CALCULATED.3IONS-SCNC: 9 MMOL/L (ref 4–13)
AST SERPL W P-5'-P-CCNC: 14 U/L (ref 5–45)
BASOPHILS # BLD AUTO: 0.01 THOUSANDS/ΜL (ref 0–0.1)
BASOPHILS NFR BLD AUTO: 0 % (ref 0–1)
BILIRUB SERPL-MCNC: 1.47 MG/DL (ref 0.2–1)
BUN SERPL-MCNC: 14 MG/DL (ref 5–25)
CALCIUM SERPL-MCNC: 8.7 MG/DL (ref 8.3–10.1)
CHLORIDE SERPL-SCNC: 97 MMOL/L (ref 100–108)
CO2 SERPL-SCNC: 28 MMOL/L (ref 21–32)
CREAT SERPL-MCNC: 0.56 MG/DL (ref 0.6–1.3)
EOSINOPHIL # BLD AUTO: 0 THOUSAND/ΜL (ref 0–0.61)
EOSINOPHIL NFR BLD AUTO: 0 % (ref 0–6)
ERYTHROCYTE [DISTWIDTH] IN BLOOD BY AUTOMATED COUNT: 14.8 % (ref 11.6–15.1)
GFR SERPL CREATININE-BSD FRML MDRD: 104 ML/MIN/1.73SQ M
GLUCOSE SERPL-MCNC: 123 MG/DL (ref 65–140)
HCT VFR BLD AUTO: 35.4 % (ref 36.5–49.3)
HGB BLD-MCNC: 11.8 G/DL (ref 12–17)
IMM GRANULOCYTES # BLD AUTO: 0.02 THOUSAND/UL (ref 0–0.2)
IMM GRANULOCYTES NFR BLD AUTO: 0 % (ref 0–2)
LIPASE SERPL-CCNC: 116 U/L (ref 73–393)
LYMPHOCYTES # BLD AUTO: 1.1 THOUSANDS/ΜL (ref 0.6–4.47)
LYMPHOCYTES NFR BLD AUTO: 13 % (ref 14–44)
MAGNESIUM SERPL-MCNC: 2.2 MG/DL (ref 1.6–2.6)
MCH RBC QN AUTO: 31.9 PG (ref 26.8–34.3)
MCHC RBC AUTO-ENTMCNC: 33.3 G/DL (ref 31.4–37.4)
MCV RBC AUTO: 96 FL (ref 82–98)
MONOCYTES # BLD AUTO: 0.55 THOUSAND/ΜL (ref 0.17–1.22)
MONOCYTES NFR BLD AUTO: 7 % (ref 4–12)
NEUTROPHILS # BLD AUTO: 6.61 THOUSANDS/ΜL (ref 1.85–7.62)
NEUTS SEG NFR BLD AUTO: 80 % (ref 43–75)
NRBC BLD AUTO-RTO: 0 /100 WBCS
PHOSPHATE SERPL-MCNC: 1.9 MG/DL (ref 2.3–4.1)
PLATELET # BLD AUTO: 249 THOUSANDS/UL (ref 149–390)
PMV BLD AUTO: 9.7 FL (ref 8.9–12.7)
POTASSIUM SERPL-SCNC: 3.4 MMOL/L (ref 3.5–5.3)
PROT SERPL-MCNC: 7.2 G/DL (ref 6.4–8.2)
RBC # BLD AUTO: 3.7 MILLION/UL (ref 3.88–5.62)
SODIUM SERPL-SCNC: 134 MMOL/L (ref 136–145)
WBC # BLD AUTO: 8.29 THOUSAND/UL (ref 4.31–10.16)

## 2022-07-02 PROCEDURE — 93005 ELECTROCARDIOGRAM TRACING: CPT

## 2022-07-02 PROCEDURE — 80053 COMPREHEN METABOLIC PANEL: CPT | Performed by: PHYSICIAN ASSISTANT

## 2022-07-02 PROCEDURE — 74018 RADEX ABDOMEN 1 VIEW: CPT

## 2022-07-02 PROCEDURE — NC001 PR NO CHARGE: Performed by: PHYSICIAN ASSISTANT

## 2022-07-02 PROCEDURE — 83735 ASSAY OF MAGNESIUM: CPT | Performed by: PHYSICIAN ASSISTANT

## 2022-07-02 PROCEDURE — 85025 COMPLETE CBC W/AUTO DIFF WBC: CPT | Performed by: PHYSICIAN ASSISTANT

## 2022-07-02 PROCEDURE — G1004 CDSM NDSC: HCPCS

## 2022-07-02 PROCEDURE — 70450 CT HEAD/BRAIN W/O DYE: CPT

## 2022-07-02 PROCEDURE — 84100 ASSAY OF PHOSPHORUS: CPT | Performed by: PHYSICIAN ASSISTANT

## 2022-07-02 PROCEDURE — 99447 NTRPROF PH1/NTRNET/EHR 11-20: CPT | Performed by: NEUROLOGICAL SURGERY

## 2022-07-02 PROCEDURE — 99233 SBSQ HOSP IP/OBS HIGH 50: CPT | Performed by: STUDENT IN AN ORGANIZED HEALTH CARE EDUCATION/TRAINING PROGRAM

## 2022-07-02 PROCEDURE — 83690 ASSAY OF LIPASE: CPT | Performed by: PHYSICIAN ASSISTANT

## 2022-07-02 RX ORDER — AMOXICILLIN 250 MG
1 CAPSULE ORAL DAILY
Refills: 0
Start: 2022-07-02

## 2022-07-02 RX ORDER — POTASSIUM CHLORIDE 14.9 MG/ML
20 INJECTION INTRAVENOUS ONCE
Status: COMPLETED | OUTPATIENT
Start: 2022-07-02 | End: 2022-07-02

## 2022-07-02 RX ORDER — HYDRALAZINE HYDROCHLORIDE 20 MG/ML
10 INJECTION INTRAMUSCULAR; INTRAVENOUS EVERY 6 HOURS PRN
Status: DISCONTINUED | OUTPATIENT
Start: 2022-07-02 | End: 2022-07-02

## 2022-07-02 RX ORDER — LEVETIRACETAM 500 MG/1
500 TABLET ORAL EVERY 12 HOURS SCHEDULED
Qty: 5 TABLET | Refills: 0 | Status: SHIPPED | OUTPATIENT
Start: 2022-07-02 | End: 2022-07-05

## 2022-07-02 RX ORDER — DEXTROSE, SODIUM CHLORIDE, AND POTASSIUM CHLORIDE 5; .9; .15 G/100ML; G/100ML; G/100ML
75 INJECTION INTRAVENOUS CONTINUOUS
Status: DISCONTINUED | OUTPATIENT
Start: 2022-07-02 | End: 2022-07-02

## 2022-07-02 RX ORDER — HYDRALAZINE HYDROCHLORIDE 20 MG/ML
5 INJECTION INTRAMUSCULAR; INTRAVENOUS ONCE
Status: COMPLETED | OUTPATIENT
Start: 2022-07-02 | End: 2022-07-02

## 2022-07-02 RX ORDER — GABAPENTIN 300 MG/1
300 CAPSULE ORAL DAILY
Qty: 30 CAPSULE | Refills: 0 | Status: SHIPPED | OUTPATIENT
Start: 2022-07-02

## 2022-07-02 RX ORDER — OXYCODONE HYDROCHLORIDE 5 MG/1
TABLET ORAL
Qty: 10 TABLET | Refills: 0 | Status: SHIPPED | OUTPATIENT
Start: 2022-07-02

## 2022-07-02 RX ORDER — HEPARIN SODIUM 5000 [USP'U]/ML
5000 INJECTION, SOLUTION INTRAVENOUS; SUBCUTANEOUS EVERY 8 HOURS SCHEDULED
Status: DISCONTINUED | OUTPATIENT
Start: 2022-07-02 | End: 2022-07-04 | Stop reason: HOSPADM

## 2022-07-02 RX ORDER — DEXTROSE AND SODIUM CHLORIDE 5; .9 G/100ML; G/100ML
75 INJECTION, SOLUTION INTRAVENOUS CONTINUOUS
Status: DISCONTINUED | OUTPATIENT
Start: 2022-07-02 | End: 2022-07-03

## 2022-07-02 RX ORDER — PANTOPRAZOLE SODIUM 40 MG/1
40 TABLET, DELAYED RELEASE ORAL
Qty: 30 TABLET | Refills: 1 | Status: SHIPPED | OUTPATIENT
Start: 2022-07-03

## 2022-07-02 RX ORDER — ACETAMINOPHEN 325 MG/1
975 TABLET ORAL EVERY 6 HOURS PRN
Refills: 0
Start: 2022-07-02

## 2022-07-02 RX ORDER — HYDRALAZINE HYDROCHLORIDE 20 MG/ML
10 INJECTION INTRAMUSCULAR; INTRAVENOUS EVERY 6 HOURS PRN
Status: DISCONTINUED | OUTPATIENT
Start: 2022-07-02 | End: 2022-07-04 | Stop reason: HOSPADM

## 2022-07-02 RX ADMIN — HEPARIN SODIUM 5000 UNITS: 5000 INJECTION INTRAVENOUS; SUBCUTANEOUS at 15:13

## 2022-07-02 RX ADMIN — DEXTROSE AND SODIUM CHLORIDE 75 ML/HR: 5; .9 INJECTION, SOLUTION INTRAVENOUS at 12:33

## 2022-07-02 RX ADMIN — NICOTINE 1 PATCH: 14 PATCH, EXTENDED RELEASE TRANSDERMAL at 07:51

## 2022-07-02 RX ADMIN — SENNOSIDES AND DOCUSATE SODIUM 1 TABLET: 50; 8.6 TABLET ORAL at 07:46

## 2022-07-02 RX ADMIN — DULOXETINE HYDROCHLORIDE 60 MG: 60 CAPSULE, DELAYED RELEASE ORAL at 07:46

## 2022-07-02 RX ADMIN — LEVETIRACETAM 500 MG: 500 TABLET, FILM COATED ORAL at 07:45

## 2022-07-02 RX ADMIN — HYDRALAZINE HYDROCHLORIDE 10 MG: 20 INJECTION INTRAMUSCULAR; INTRAVENOUS at 15:30

## 2022-07-02 RX ADMIN — POTASSIUM PHOSPHATE, MONOBASIC AND POTASSIUM PHOSPHATE, DIBASIC 21 MMOL: 224; 236 INJECTION, SOLUTION, CONCENTRATE INTRAVENOUS at 15:05

## 2022-07-02 RX ADMIN — ATORVASTATIN CALCIUM 40 MG: 40 TABLET, FILM COATED ORAL at 07:45

## 2022-07-02 RX ADMIN — OXYCODONE HYDROCHLORIDE 5 MG: 5 TABLET ORAL at 05:30

## 2022-07-02 RX ADMIN — METOCLOPRAMIDE HYDROCHLORIDE 10 MG: 5 INJECTION INTRAMUSCULAR; INTRAVENOUS at 16:20

## 2022-07-02 RX ADMIN — ONDANSETRON 4 MG: 2 INJECTION INTRAMUSCULAR; INTRAVENOUS at 15:48

## 2022-07-02 RX ADMIN — LEVETIRACETAM 500 MG: 500 TABLET, FILM COATED ORAL at 20:01

## 2022-07-02 RX ADMIN — ACETAMINOPHEN 975 MG: 325 TABLET, FILM COATED ORAL at 19:56

## 2022-07-02 RX ADMIN — HEPARIN SODIUM 5000 UNITS: 5000 INJECTION INTRAVENOUS; SUBCUTANEOUS at 05:22

## 2022-07-02 RX ADMIN — PANTOPRAZOLE SODIUM 40 MG: 40 TABLET, DELAYED RELEASE ORAL at 05:22

## 2022-07-02 RX ADMIN — ONDANSETRON 4 MG: 2 INJECTION INTRAMUSCULAR; INTRAVENOUS at 09:12

## 2022-07-02 RX ADMIN — GABAPENTIN 300 MG: 300 CAPSULE ORAL at 07:47

## 2022-07-02 RX ADMIN — ONDANSETRON 4 MG: 2 INJECTION INTRAMUSCULAR; INTRAVENOUS at 21:23

## 2022-07-02 RX ADMIN — HYDRALAZINE HYDROCHLORIDE 5 MG: 20 INJECTION, SOLUTION INTRAMUSCULAR; INTRAVENOUS at 13:03

## 2022-07-02 RX ADMIN — DEXTROSE, SODIUM CHLORIDE, AND POTASSIUM CHLORIDE 75 ML/HR: 5; .9; .15 INJECTION INTRAVENOUS at 11:28

## 2022-07-02 RX ADMIN — HYDRALAZINE HYDROCHLORIDE 5 MG: 20 INJECTION INTRAMUSCULAR; INTRAVENOUS at 07:57

## 2022-07-02 RX ADMIN — HEPARIN SODIUM 5000 UNITS: 5000 INJECTION INTRAVENOUS; SUBCUTANEOUS at 21:23

## 2022-07-02 RX ADMIN — HYDROXYZINE HYDROCHLORIDE 25 MG: 25 TABLET ORAL at 07:45

## 2022-07-02 RX ADMIN — POTASSIUM CHLORIDE 20 MEQ: 14.9 INJECTION, SOLUTION INTRAVENOUS at 12:34

## 2022-07-02 NOTE — CASE MANAGEMENT
Case Management Discharge Planning Note    Patient name Desiree Felty  Location [address] [facility] [address] 603/PPHP 038-61 MRN [id number]  : 1950 Date 2022       Current Admission Date: 2022  Current Admission Diagnosis:Subdural hematoma [facility])   Patient Active Problem List    Diagnosis Date Noted    Nausea and vomiting 2022    Hypokalemia 2022    Acute blood loss anemia 2022    GIB (gastrointestinal bleeding) 2022    Hemiplegia, post-stroke ([facility] ) 2022    Essential hypertension 2022    GERD (gastroesophageal reflux disease) 2022    Major depressive disorder 2022    Tobacco use 2022    Subdural hematoma ([facility] ) 2022    Chronic bilateral low back pain without sciatica 2020    Cervical spinal stenosis 2019    Cervical spondylosis 2019    Cervical radiculopathy 2019    Neck pain 2019    Cervical disc disorder with radiculopathy of mid-cervical region 2019    Long-term current use of opiate analgesic     Uncomplicated opioid dependence ([facility] ) 2019    Chronic pain syndrome 2019    Lumbar spondylosis 2019      LOS (days): 5  Geometric Mean LOS (GMLOS) (days): 3 30  Days to GMLOS:-1 5     OBJECTIVE:  Risk of Unplanned Readmission Score: 14 14         Current admission status: Inpatient   Preferred Pharmacy:   [facility] #2 - [address] - [address] [facility] [address]  [address] [facility] [address] [facility] [address]  Phone: [phone] Fax: [phone]    Primary Care Provider: Rebeca Mckeon DO    Primary Insurance: Kamreon Lynn [facility] RE[facility]  Secondary Insurance: [address] DETAILS:    Pt vomiting this morning so pt's d/c to [facility] was cancelled  Facility and transport were made aware  Plan to monitor and possible d/c there later today vs tomorrow

## 2022-07-02 NOTE — INCIDENTAL FINDINGS
The following findings require follow up:  Radiographic finding   Finding:  Mild emphysema within the lung apices  Follow up required: family doctor   Follow up should be done within 2 week(s)    Patient formed of incidental findings recommended follow-up  He verbalized understanding

## 2022-07-02 NOTE — DISCHARGE SUMMARY
[address] [facility]  Discharge- Jonel Urena 1950, 80 y o  male MRN: [id number]  Unit/Bed#: [facility] 603-01 Encounter: [id number]  Primary Care Provider: Waylan Boas, DO   Date and time admitted to hospital: 6/27/2022  5:41 PM    * Subdural hematoma [facility])  Assessment & Plan  S/p fall on Eliquis, received [facility] on admission due to SDH  TEG obtained on arrival to [facility] without coagulopathies  Repeat CT head on 6/28 revealed stability  Appreciate neurosurgery evaluation and recommendations including conservative management  On SQH for DVT ppx and Keppra for seizure prophylaxis for 7 days  Continue to hold AC/AP, including home eliquis until cleared by neurosurgery to resume  PT OT evaluation and treatment  F/u with neurosurgery in  2 weeks with repeat CT head at that time    GIB (gastrointestinal bleeding)  Assessment & Plan  Presented with coffee ground emesis, suspected upper GI source  EDG and colonoscopy completed this admission  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  Biopsies of esophagus, stomach and duodenum were taken and pending  GI recommends PPI indefinitely  Hgb stable, no further signs of ongoing bleeding/coffee ground emesis  F/u with GI as outpatient for f/u of biopsy results and ongoing management as indicated        Acute blood loss anemia  Assessment & Plan  2/2 suspected upper GI bleed  Hgb stable at 11 7 at last check  No signs of ongoing bleeding  Heparin for DVT prophylaxis  F/u outpatient with GI  Continue PPI on discharge, indefinitely  Hypokalemia  Assessment & Plan  Potassium 2 8 on 7/1  Repleted and stable/improved to 3/5 on recheck       Hemiplegia, post-stroke [facility])  Assessment & Plan  Chronic L sided hemiplegia, stable                Medical Problems             Resolved Problems  Date Reviewed: 7/2/2022   None                 Admission Date:   Admission Orders (From admission, onward)     Ordered        06/27/22 9208 Inpatient Admission  Once                        Admitting Diagnosis: SDH    HPI: As documented by Dr Fidelina Riggins who evaluated the patient on admission, "Jeffry Coleman is a 80 y o  male with PMHx of sick sinus syndrome, CVA, pAfib on Eliquis, HTN, GERD, AAA, HLD, chronic pain, hemiplegia, depression anxiety, who presented to [address] [place] [address] ED this morning complaining of one week of coffee ground emesis  Patient was then transferred to [address] [place] [address] for GI evaluation  Upon arrival, he was noted to be confused  Upon further prompting, he endorsed a fall three days ago where he hit his head  No LOC  He did not present to the ED right away because he only had a headache  His sister encouraged his arrival to the [address] [place] [address] ED early this morning  Workup initiated at [address] [place] [address] after hearing of the fall and confusion revealed a SDH  He was given [facility] for history of Eliquis  Patient was transferred to [facility] for trauma and neurosurgery evaluation and management       Upon arrival to [facility], he complains of a headache  States he started vomiting blood yesterday  Endorses a fall where he lost his balance 3 days ago, but denies LOC  "    Procedures Performed: No orders of the defined types were placed in this encounter  Summary of Hospital Course:  Patient was placed on the trauma service following fall when he was found have a subdural hematoma  He was given [facility] for reversal of his Eliquis on admission  His Eliquis was held and neurosurgery evaluated  They recommended conservative management he underwent repeat CT head on 6/28 which showed stability  He remained a GCS of 15 and nonfocal, aside from his chronic left-sided hemiparesis from prior stroke  He developed coffee-ground emesis during his admission and GI was consulted  They took him for colonoscopy an EGD  Colonoscopy was negative  EGD showed long segment Wise's esophagus, hiatal hernia, and scalloping of the duodenal villi    Biopsies were taken of the esophagus, stomach, and duodenum  Biopsy results are pending at this time and patient will follow up outpatient with GI for results and further management as indicated  He was recommended to continue with  PPI indefinitely  This was started during his admission and will be continued on discharge  Patient was up and ambulatory with PT and OT recommended discharge to inpatient rehab  Case Management secured placement for him at [facility]  Patient will discharged there today  He will follow up with Neurosurgery in 2 weeks with repeat CT head at that time  He will hold his Eliquis until cleared by Neurosurgery to resume  He will follow up with GI as an outpatient in 2 weeks 2 weeks as well for follow-up regarding his biopsy results and Blunt's esophagus  Today patient is having a mild headache and some nausea  He describes his headache as dull and achy, unchanged from earlier this admission  He occasionally spits up some brown fluid which he states "happens all the time at home"  He denies abdominal pain, N/V, dizziness/lighheadedness or bright red bloody emesis  He slept well last night  He has been tolerating a diet and is asking for coffee  He is motivated to go to rehab today  Exam:  Vitals:    07/01/22 1544   BP: 150/85   Pulse: 80   Resp: 17   Temp: 99 6 °F (37 6 °C)   SpO2: 96%     GEN: NAD  HEENT:  Normocephalic, atraumatic  NEURO:  GCS 15, baseline left-sided walt paresis noted  Strength 5/5 throughout right upper and right lower extremity    CV:  Regular rate and rhythm, no murmurs gallops or rubs  PULM:  Clear to auscultation bilaterally  GI:  Soft, nontender, nondistended  :  Voiding  MSK:  No edema, contusions or deformity  SKIN:  Pink, warm, dry      Significant Findings, Care, Treatment and Services Provided:   EGD    Result Date: 6/30/2022  Impression: Long segment blunt's performed 4 quadrant biopsies every 2 cms 5 cms hiatal hernia Biopsied stomach for H pylori Scalloping of the duodenal villi- biopsied for celiac disease RECOMMENDATION: Await pathology results PPI indefinitely  ATTENDING ATTESTATION:  I was present throughout the entire procedure from insertion to complete withdrawal of the scope  I performed all interventions myself or oversaw the fellow  Eber Valadez MD     Colonoscopy    Result Date: 6/30/2022  Impression: Normal colonoscopy External hemorrhoids RECOMMENDATION: No further screening colonoscopies necessary ATTENDING ATTESTATION:  I was present throughout the entire procedure from insertion to complete withdrawal of the scope  I performed all interventions myself or oversaw the fellow  Eber Valadez MD     XR chest 1 view portable    Result Date: 6/27/2022  Impression: 1  No acute cardiopulmonary disease noted  2   Tortuous thoracic aorta with possible mild aneurysmal dilatation of descending aorta  Workstation performed: [id number]     XR shoulder 2+ vw left    Result Date: 6/27/2022  Impression: No acute osseous abnormality  Degenerative changes as described  Workstation performed: [id number]     XR shoulder 2+ vw right    Result Date: 6/27/2022  Impression: No acute osseous abnormality  Workstation performed: [id number]     CT head wo contrast    Result Date: 6/28/2022  Impression: Stable CT appearance of the brain with acute left frontal parietal subdural resulting in only minimal mass effect  Several areas of contusion in the infarcted parenchyma within the posterior right hemisphere are stable  Workstation performed: [id number]     CT head wo contrast    Result Date: 6/27/2022  Impression: Acute subdural hematoma within the left hemisphere superficial to the frontal and parietal lobes with only minimal mass effect upon the adjacent brain parenchyma  Small hemorrhagic contusions are seen within the posterior aspect of the right hemisphere, within an area of encephalomalacia related to previous old infarct  No resulting mass effect   Additional chronic microangiopathic change within the brain parenchyma  I personally discussed this study with VIKKI ALBARADO on 6/27/2022 at 2:10 PM  Workstation performed: [id number]     CT facial bones wo contrast    Result Date: 6/27/2022  Impression: No acute osseous abnormality  Subtle left facial soft tissue contusion superficial to the frontal bone and orbit  There is  an acute subdural hematoma within the left hemisphere  See separate CT brain report  Workstation performed: [id number]     CT spine cervical wo contrast    Result Date: 6/27/2022  Impression: Cervical degenerative change with moderate canal stenosis and foraminal narrowing  No acute osseous abnormality  Workstation performed: [id number]       Complications: none    Condition at Discharge: good         Discharge instructions/Information to patient and family:   See after visit summary for information provided to patient and family  Provisions for Follow-Up Care:  See after visit summary for information related to follow-up care and any pertinent home health orders  PCP: Ronni Isaacs DO    Disposition: Short-term rehab at [facility] Readmission: No    Discharge Statement   I spent 25 minutes discharging the patient  This time was spent on the day of discharge  I had direct contact with the patient on the day of discharge  Additional documentation is required if more than 30 minutes were spent on discharge  Discharge Medications:  See after visit summary for reconciled discharge medications provided to patient and family

## 2022-07-02 NOTE — ASSESSMENT & PLAN NOTE
- having bilious emesis this morning  - abdominal exam is benign, non-distended  Will check KUB and labs including CBC/BMP/Lipase     - in setting of headache and known SDH, will repeat CT head to evaluate for stability and r/o increase in SDH which may be causing symptoms  - zofran prn for N/V  - diet as tolerated pending KUB and CT head results

## 2022-07-02 NOTE — ASSESSMENT & PLAN NOTE
S/p fall on Eliquis, received [address] on admission due to SDH  TEG obtained on arrival to [facility] without coagulopathies  Repeat CT head on 6/28 revealed stability  Due to worsening headache this morning, 7/2 with N/V associated, will check repeat CT head stat to evaluate for increase in SDH  Appreciate neurosurgery evaluation and recommendations including conservative management  On SQH for DVT ppx and Keppra for seizure prophylaxis for 7 days     Continue to hold AC/AP, including home eliquis until cleared by neurosurgery to resume  PT OT evaluation and treatment  F/u with neurosurgery in  2 weeks with repeat CT head at that time

## 2022-07-02 NOTE — ASSESSMENT & PLAN NOTE
2/2 suspected upper GI bleed  Hgb stable at 11 7 at last check  No signs of ongoing bleeding  Heparin for DVT prophylaxis  F/u outpatient with GI  Continue PPI on discharge, indefinitely

## 2022-07-02 NOTE — PROGRESS NOTES
[address] [facility]  Progress Note - Jesús Reeves 1950, 80 y o  male MRN: [id number]  Unit/Bed#: [facility] 603-01 Encounter: [id number]  Primary Care Provider: Vesna Espinoza DO   Date and time admitted to hospital: 6/27/2022  5:41 PM    * Subdural hematoma [facility])  Assessment & Plan  S/p fall on Eliquis, received [facility] on admission due to SDH  TEG obtained on arrival to [facility] without coagulopathies  Repeat CT head on 6/28 revealed stability  Due to worsening headache this morning, 7/2 with N/V associated, will check repeat CT head stat to evaluate for increase in SDH  Appreciate neurosurgery evaluation and recommendations including conservative management  On SQH for DVT ppx and Keppra for seizure prophylaxis for 7 days  Continue to hold AC/AP, including home eliquis until cleared by neurosurgery to resume  PT OT evaluation and treatment  F/u with neurosurgery in  2 weeks with repeat CT head at that time    GIB (gastrointestinal bleeding)  Assessment & Plan  Presented with coffee ground emesis, suspected upper GI source  EDG and colonoscopy completed this admission  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  Biopsies of esophagus, stomach and duodenum were taken and pending  GI recommends PPI indefinitely  Hgb stable, no further signs of ongoing bleeding/coffee ground emesis  F/u with GI as outpatient for f/u of biopsy results and ongoing management as indicated        Acute blood loss anemia  Assessment & Plan  2/2 suspected upper GI bleed  Hgb stable at 11 7 at last check  No signs of ongoing bleeding  Heparin for DVT prophylaxis  F/u outpatient with GI  Continue PPI on discharge, indefinitely  Nausea and vomiting  Assessment & Plan  - having bilious emesis this morning  - abdominal exam is benign, non-distended  Will check KUB and labs including CBC/BMP/Lipase     - in setting of headache and known SDH, will repeat CT head to evaluate for stability and r/o increase in SDH which may be causing symptoms  - zofran prn for N/V  - diet as tolerated pending KUB and CT head results    Hypokalemia  Assessment & Plan  Potassium 2 8 on 7/1  Repleted and stable/improved to 3/5 on recheck  F/u repeat BMP this morning  Hemiplegia, post-stroke [facility])  Assessment & Plan  Chronic L sided hemiplegia, stable            Disposition: continue med-surg status, will cancel transport and d/c for now due to N/V  Will check CT head, KUB and labs as stated above  Diet as tolerated if CT head and KUB not concerning  SUBJECTIVE:  Chief Complaint: "I don't feel well"    Subjective: patient reports he doesn't feel well this morning  He reports having a headache which he states is unchanged from earlier this admission,but he does feel that it is causing his nausea/vomiting today  He denies abdominal pain  He has been passing gas from below  OBJECTIVE:   Vitals:   Temp:  [98 3 °F (36 8 °C)-99 6 °F (37 6 °C)] 98 3 °F (36 8 °C)  HR:  [55-80] 55  Resp:  [17-18] 18  BP: (150-172)/(68-90) 166/90    Intake/Output:  I/O       06/30 0701  07/01 0700 07/01 0701  07/02 0700 07/02 0701  07/03 0700    P  O  0 340     IV Piggyback 150      Total Intake(mL/kg) 150 (2 5) 340 (5 6)     Urine (mL/kg/hr) 750 (0 5) 1400 (1)     Emesis/NG output 0      Stool       Total Output 750 1400     Net -600 -1060            Unmeasured Urine Occurrence 2 x      Unmeasured Stool Occurrence 3 x      Unmeasured Emesis Occurrence 2 x           Nutrition: Diet Regular; Regular House  Discharge Diet  GI Proph/Bowel Reg: Senokot  VTE Prophylaxis:Sequential compression device (Venodyne)  and Heparin     Physical Exam:   GENERAL APPEARANCE: + Uncomfortable, having emesis  NEURO: GCS 15,non-focal  HEENT: NCAT  CV: RRR, no MGR  LUNGS: CTA bilaterally  GI: soft,non-tender,non-distended  : voiding  MSK: no edema, contusions or deformities  SKIN: pink,w arm, dry    Invasive Devices  Report    Peripheral Intravenous Line Duration           Peripheral IV 07/01/22 Right;Ventral (anterior) Forearm 1 day    Peripheral IV 07/01/22 Right;Upper;Ventral (anterior) Arm <1 day          Drain  Duration           External Urinary Catheter Medium 4 days                      Lab Results:   Results: I have personally reviewed all pertinent laboratory/tests results, BMP/CMP:   Lab Results   Component Value Date    SODIUM 133 (L) 07/01/2022    K 3 5 07/01/2022    CL 98 (L) 07/01/2022    CO2 28 07/01/2022    BUN 8 07/01/2022    CREATININE 0 64 07/01/2022    CALCIUM 9 0 07/01/2022    EGFR 99 07/01/2022    and CBC: No results found for: WBC, HGB, HCT, MCV, PLT, ADJUSTEDWBC, MCH, MCHC, RDW, MPV, NRBC  Imaging/EKG Studies: I have personally reviewed pertinent reports       Other Studies: no new

## 2022-07-02 NOTE — ASSESSMENT & PLAN NOTE
2/2 suspected upper GI bleed  Hgb stable at 11 7 at last check  No signs of ongoing bleeding  Heparin for DVT prophylaxis  F/u outpatient with GI  Continue BID PPI on discharge, indefinitely

## 2022-07-02 NOTE — ASSESSMENT & PLAN NOTE
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

## 2022-07-02 NOTE — ASSESSMENT & PLAN NOTE
S/p fall on Eliquis, received [facility] on admission due to SDH  TEG obtained on arrival to [facility] without coagulopathies  Repeat CT head on 6/28 revealed stability  Appreciate neurosurgery evaluation and recommendations including conservative management  On SQH for DVT ppx and Keppra for seizure prophylaxis for 7 days     Continue to hold AC/AP, including home eliquis until cleared by neurosurgery to resume  PT OT evaluation and treatment  F/u with neurosurgery in  2 weeks with repeat CT head at that time

## 2022-07-02 NOTE — PROGRESS NOTES
CT head reveals increased R SD hygroma with improved SDH  Spoke with neurosurgery who does not feel there is need for intervention at this time, as hygroma is expected given prior right sided encephalomalacia and the hygroma is not significant in size  Cleared to continue SQH  NSg will f/u in 2 weeks with repeat CT head at that time  Will monitor closely on HOT with q1h neuro checks for now  KUB shows no significant abnormality and patient continues to have no abdominal pain  Abdominal exam is benign  Labs reveal no significant abnormality with exception of hypokalemia and hypophosphatemia which repletion was ordered  EKG reveals stability from prior EKGs showing sinus bradycardia with rate of 53 bpm, left anterior fasicular block and no acute ischemic changes  Will monitor neuro exam closely and hydrate with IVFs  Diet as tolerated  Zofran and reglan prn N/V  Tylenol as needed for headache  Wean oxycodone as this will not help N/V on an empty stomach and can cause rebound headaches in TBI  Will plan on holding discharge today

## 2022-07-02 NOTE — ASSESSMENT & PLAN NOTE
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

## 2022-07-03 LAB
ANION GAP SERPL CALCULATED.3IONS-SCNC: 5 MMOL/L (ref 4–13)
ANION GAP SERPL CALCULATED.3IONS-SCNC: 8 MMOL/L (ref 4–13)
ATRIAL RATE: 53 BPM
ATRIAL RATE: 54 BPM
BASOPHILS # BLD AUTO: 0.01 THOUSANDS/ΜL (ref 0–0.1)
BASOPHILS NFR BLD AUTO: 0 % (ref 0–1)
BUN SERPL-MCNC: 12 MG/DL (ref 5–25)
BUN SERPL-MCNC: 9 MG/DL (ref 5–25)
CALCIUM SERPL-MCNC: 8.3 MG/DL (ref 8.3–10.1)
CALCIUM SERPL-MCNC: 8.4 MG/DL (ref 8.3–10.1)
CHLORIDE SERPL-SCNC: 102 MMOL/L (ref 100–108)
CHLORIDE SERPL-SCNC: 107 MMOL/L (ref 100–108)
CO2 SERPL-SCNC: 25 MMOL/L (ref 21–32)
CO2 SERPL-SCNC: 25 MMOL/L (ref 21–32)
CREAT SERPL-MCNC: 0.54 MG/DL (ref 0.6–1.3)
CREAT SERPL-MCNC: 0.65 MG/DL (ref 0.6–1.3)
EOSINOPHIL # BLD AUTO: 0 THOUSAND/ΜL (ref 0–0.61)
EOSINOPHIL NFR BLD AUTO: 0 % (ref 0–6)
ERYTHROCYTE [DISTWIDTH] IN BLOOD BY AUTOMATED COUNT: 15.1 % (ref 11.6–15.1)
GFR SERPL CREATININE-BSD FRML MDRD: 106 ML/MIN/1.73SQ M
GFR SERPL CREATININE-BSD FRML MDRD: 98 ML/MIN/1.73SQ M
GLUCOSE SERPL-MCNC: 140 MG/DL (ref 65–140)
GLUCOSE SERPL-MCNC: 99 MG/DL (ref 65–140)
HCT VFR BLD AUTO: 30.4 % (ref 36.5–49.3)
HGB BLD-MCNC: 10 G/DL (ref 12–17)
IMM GRANULOCYTES # BLD AUTO: 0.04 THOUSAND/UL (ref 0–0.2)
IMM GRANULOCYTES NFR BLD AUTO: 0 % (ref 0–2)
LYMPHOCYTES # BLD AUTO: 1.1 THOUSANDS/ΜL (ref 0.6–4.47)
LYMPHOCYTES NFR BLD AUTO: 10 % (ref 14–44)
MAGNESIUM SERPL-MCNC: 2.2 MG/DL (ref 1.6–2.6)
MCH RBC QN AUTO: 31.9 PG (ref 26.8–34.3)
MCHC RBC AUTO-ENTMCNC: 32.9 G/DL (ref 31.4–37.4)
MCV RBC AUTO: 97 FL (ref 82–98)
MONOCYTES # BLD AUTO: 0.75 THOUSAND/ΜL (ref 0.17–1.22)
MONOCYTES NFR BLD AUTO: 7 % (ref 4–12)
NEUTROPHILS # BLD AUTO: 9.39 THOUSANDS/ΜL (ref 1.85–7.62)
NEUTS SEG NFR BLD AUTO: 83 % (ref 43–75)
NRBC BLD AUTO-RTO: 0 /100 WBCS
P AXIS: 65 DEGREES
P AXIS: 69 DEGREES
PHOSPHATE SERPL-MCNC: 2 MG/DL (ref 2.3–4.1)
PLATELET # BLD AUTO: 224 THOUSANDS/UL (ref 149–390)
PMV BLD AUTO: 9.8 FL (ref 8.9–12.7)
POTASSIUM SERPL-SCNC: 2.9 MMOL/L (ref 3.5–5.3)
POTASSIUM SERPL-SCNC: 4.1 MMOL/L (ref 3.5–5.3)
PR INTERVAL: 168 MS
PR INTERVAL: 170 MS
QRS AXIS: -54 DEGREES
QRS AXIS: -55 DEGREES
QRSD INTERVAL: 102 MS
QRSD INTERVAL: 106 MS
QT INTERVAL: 464 MS
QT INTERVAL: 468 MS
QTC INTERVAL: 439 MS
QTC INTERVAL: 440 MS
RBC # BLD AUTO: 3.13 MILLION/UL (ref 3.88–5.62)
SODIUM SERPL-SCNC: 135 MMOL/L (ref 136–145)
SODIUM SERPL-SCNC: 137 MMOL/L (ref 136–145)
T WAVE AXIS: 38 DEGREES
T WAVE AXIS: 45 DEGREES
VENTRICULAR RATE: 53 BPM
VENTRICULAR RATE: 54 BPM
WBC # BLD AUTO: 11.29 THOUSAND/UL (ref 4.31–10.16)

## 2022-07-03 PROCEDURE — 85025 COMPLETE CBC W/AUTO DIFF WBC: CPT

## 2022-07-03 PROCEDURE — 80048 BASIC METABOLIC PNL TOTAL CA: CPT | Performed by: PHYSICIAN ASSISTANT

## 2022-07-03 PROCEDURE — 93010 ELECTROCARDIOGRAM REPORT: CPT | Performed by: INTERNAL MEDICINE

## 2022-07-03 PROCEDURE — 84100 ASSAY OF PHOSPHORUS: CPT | Performed by: PHYSICIAN ASSISTANT

## 2022-07-03 PROCEDURE — 83735 ASSAY OF MAGNESIUM: CPT | Performed by: PHYSICIAN ASSISTANT

## 2022-07-03 PROCEDURE — 99232 SBSQ HOSP IP/OBS MODERATE 35: CPT | Performed by: SURGERY

## 2022-07-03 RX ORDER — POTASSIUM CHLORIDE 20 MEQ/1
20 TABLET, EXTENDED RELEASE ORAL ONCE
Status: COMPLETED | OUTPATIENT
Start: 2022-07-03 | End: 2022-07-03

## 2022-07-03 RX ORDER — POTASSIUM CHLORIDE 14.9 MG/ML
20 INJECTION INTRAVENOUS
Status: COMPLETED | OUTPATIENT
Start: 2022-07-03 | End: 2022-07-03

## 2022-07-03 RX ORDER — METOCLOPRAMIDE 5 MG/1
5 TABLET ORAL
Status: DISCONTINUED | OUTPATIENT
Start: 2022-07-03 | End: 2022-07-04 | Stop reason: HOSPADM

## 2022-07-03 RX ORDER — METOCLOPRAMIDE HYDROCHLORIDE 5 MG/ML
5 INJECTION INTRAMUSCULAR; INTRAVENOUS EVERY 6 HOURS PRN
Status: DISCONTINUED | OUTPATIENT
Start: 2022-07-03 | End: 2022-07-04 | Stop reason: HOSPADM

## 2022-07-03 RX ADMIN — METOPROLOL SUCCINATE 100 MG: 100 TABLET, EXTENDED RELEASE ORAL at 08:11

## 2022-07-03 RX ADMIN — DULOXETINE HYDROCHLORIDE 60 MG: 60 CAPSULE, DELAYED RELEASE ORAL at 08:11

## 2022-07-03 RX ADMIN — POTASSIUM CHLORIDE 20 MEQ: 1500 TABLET, EXTENDED RELEASE ORAL at 11:05

## 2022-07-03 RX ADMIN — SENNOSIDES AND DOCUSATE SODIUM 1 TABLET: 50; 8.6 TABLET ORAL at 08:13

## 2022-07-03 RX ADMIN — POTASSIUM CHLORIDE 20 MEQ: 14.9 INJECTION, SOLUTION INTRAVENOUS at 11:05

## 2022-07-03 RX ADMIN — ATORVASTATIN CALCIUM 40 MG: 40 TABLET, FILM COATED ORAL at 08:11

## 2022-07-03 RX ADMIN — LEVETIRACETAM 500 MG: 500 TABLET, FILM COATED ORAL at 08:11

## 2022-07-03 RX ADMIN — POTASSIUM PHOSPHATE, MONOBASIC AND POTASSIUM PHOSPHATE, DIBASIC 21 MMOL: 224; 236 INJECTION, SOLUTION, CONCENTRATE INTRAVENOUS at 15:04

## 2022-07-03 RX ADMIN — NICOTINE 1 PATCH: 14 PATCH, EXTENDED RELEASE TRANSDERMAL at 08:13

## 2022-07-03 RX ADMIN — ACETAMINOPHEN 975 MG: 325 TABLET, FILM COATED ORAL at 05:35

## 2022-07-03 RX ADMIN — POTASSIUM CHLORIDE 20 MEQ: 14.9 INJECTION, SOLUTION INTRAVENOUS at 12:54

## 2022-07-03 RX ADMIN — HEPARIN SODIUM 5000 UNITS: 5000 INJECTION INTRAVENOUS; SUBCUTANEOUS at 13:00

## 2022-07-03 RX ADMIN — HEPARIN SODIUM 5000 UNITS: 5000 INJECTION INTRAVENOUS; SUBCUTANEOUS at 05:34

## 2022-07-03 RX ADMIN — HEPARIN SODIUM 5000 UNITS: 5000 INJECTION INTRAVENOUS; SUBCUTANEOUS at 22:18

## 2022-07-03 RX ADMIN — ACETAMINOPHEN 975 MG: 325 TABLET, FILM COATED ORAL at 22:17

## 2022-07-03 RX ADMIN — DEXTROSE AND SODIUM CHLORIDE 75 ML/HR: 5; .9 INJECTION, SOLUTION INTRAVENOUS at 12:57

## 2022-07-03 RX ADMIN — LEVETIRACETAM 500 MG: 500 TABLET, FILM COATED ORAL at 22:00

## 2022-07-03 RX ADMIN — PANTOPRAZOLE SODIUM 40 MG: 40 TABLET, DELAYED RELEASE ORAL at 05:35

## 2022-07-03 RX ADMIN — OXYCODONE HYDROCHLORIDE 5 MG: 5 TABLET ORAL at 22:17

## 2022-07-03 RX ADMIN — OXYCODONE HYDROCHLORIDE 5 MG: 5 TABLET ORAL at 01:40

## 2022-07-03 RX ADMIN — GABAPENTIN 300 MG: 300 CAPSULE ORAL at 08:11

## 2022-07-03 RX ADMIN — METOCLOPRAMIDE HYDROCHLORIDE 10 MG: 5 INJECTION INTRAMUSCULAR; INTRAVENOUS at 01:40

## 2022-07-03 RX ADMIN — DEXTROSE AND SODIUM CHLORIDE 75 ML/HR: 5; .9 INJECTION, SOLUTION INTRAVENOUS at 01:40

## 2022-07-03 NOTE — PROGRESS NOTES
[address] [facility]  Progress Note - Sarbjit Reyes 1950, 80 y o  male MRN: [id number]  Unit/Bed#: [facility] 603-01 Encounter: [id number]  Primary Care Provider: Viry Mercado DO   Date and time admitted to hospital: 6/27/2022  5:41 PM    * Subdural hematoma [facility])  Assessment & Plan  S/p fall on Eliquis, received [facility] on admission due to SDH  TEG obtained on arrival to [facility] without coagulopathies  Repeat CT head on 6/28 revealed stability  F/u CT on 7/2 showed improved SDH on L with slight increase in SD hygroma on Right side  No further intervention recommended by neurosurgery and cleared to continue SQH  Appreciate neurosurgery evaluation and recommendations including conservative management  On SQH for DVT ppx   Keppra for seizure prophylaxis for 7 days  Continue to hold AC/AP, including home eliquis until cleared by neurosurgery to resume  PT OT evaluation and treatment  F/u with neurosurgery in  2 weeks with repeat CT head at that time    GIB (gastrointestinal bleeding)  Assessment & Plan  Presented with coffee ground emesis, suspected upper GI source  EDG and colonoscopy completed this admission  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  Biopsies of esophagus, stomach and duodenum were taken and pending  GI recommends PPI indefinitely  Hgb stable, no further signs of ongoing bleeding/coffee ground emesis  F/u with GI as outpatient for f/u of biopsy results and ongoing management as indicated        Acute blood loss anemia  Assessment & Plan  2/2 suspected upper GI bleed  Hgb stable at 11 7 at last check  No signs of ongoing bleeding  Heparin for DVT prophylaxis  F/u outpatient with GI  Continue PPI on discharge, indefinitely  Nausea and vomiting  Assessment & Plan  - had bilious emesis on 7/2  - abdominal exam benign, non-distended  KUB negative for obstruction or ileus/distention  Labs unremarkable   Given multiple doses of IV zofran and reglan  - CT head with slight increase in R sided SDH, per neurosurgery is clinically insignificant and no intervention needed  - Will schedule reglan today  Overall N/V is improved and he is tolerating a diet  Hypokalemia  Assessment & Plan  Potassium 2 9 on 7/1  Replete today and recheck BMP this evening  Due to poor oral intake yesterday, which is improved today  Hemiplegia, post-stroke [facility])  Assessment & Plan  Chronic L sided hemiplegia, stable            Disposition:  Down-grade to Med surg status and decrease neuro checks to q4h    SUBJECTIVE:  Chief Complaint: "I'm feeling well"    Subjective:  Patient reports improvement in his headaches as well as his nausea and vomiting today  He is feeling like having some oatmeal for breakfast today which is an improvement from yesterday  He has no new complaints otherwise  He states he slept okay last night  OBJECTIVE:   Vitals:   Temp:  [97 8 °F (36 6 °C)-99 °F (37 2 °C)] 99 °F (37 2 °C)  HR:  [60-66] 62  Resp:  [18-20] 18  BP: (131-165)/(60-77) 141/68    Intake/Output:  I/O       07/01 0701  07/02 0700 07/02 0701  07/03 0700 07/03 0701  07/04 0700    P  O  340 300 240    I V  (mL/kg)  1346 3 (22 3) 367 5 (6 1)    IV Piggyback   200    Total Intake(mL/kg) 340 (5 6) 1646 3 (27 2) 807 5 (13 3)    Urine (mL/kg/hr) 1400 (1) 750 (0 5) 525 (0 8)    Emesis/NG output  0     Total Output 1400 750 525    Net -1060 +896 3 +282 5           Unmeasured Urine Occurrence  1 x 1 x    Unmeasured Emesis Occurrence  2 x          Nutrition: Diet Regular; Regular House  Discharge Diet  GI Proph/Bowel Reg:  Senokot  VTE Prophylaxis:Heparin     Physical Exam:   GENERAL APPEARANCE:  No acute distress  NEURO:  GCS 15, +left-sided hemiparesis is stable, right-sided strength is 5/5 throughout right upper and right lower extremities  HEENT:  Normocephalic, atraumatic  CV:  Regular rate and rhythm, no murmurs gallops or rubs  LUNGS:  Clear to auscultation bilaterally  GI:  Soft, nontender, nondistended  :  Voiding  MSK:  No edema, contusions or deformities  SKIN:  Pink, warm, dry    Invasive Devices  Report    Peripheral Intravenous Line  Duration           Peripheral IV 07/01/22 Right;Upper;Ventral (anterior) Arm 2 days    Peripheral IV 07/01/22 Right;Ventral (anterior) Forearm 2 days          Drain  Duration           External Urinary Catheter Medium 6 days                      Lab Results:   Results: I have personally reviewed all pertinent laboratory/tests results, BMP/CMP:   Lab Results   Component Value Date    SODIUM 137 07/03/2022    K 4 1 07/03/2022     07/03/2022    CO2 25 07/03/2022    BUN 9 07/03/2022    CREATININE 0 65 07/03/2022    CALCIUM 8 3 07/03/2022    EGFR 98 07/03/2022    and CBC:   Lab Results   Component Value Date    WBC 11 29 (H) 07/03/2022    HGB 10 0 (L) 07/03/2022    HCT 30 4 (L) 07/03/2022    MCV 97 07/03/2022     07/03/2022    MCH 31 9 07/03/2022    MCHC 32 9 07/03/2022    RDW 15 1 07/03/2022    MPV 9 8 07/03/2022    NRBC 0 07/03/2022     Imaging/EKG Studies: I have personally reviewed pertinent reports       Other Studies:  No new

## 2022-07-03 NOTE — ASSESSMENT & PLAN NOTE
Potassium 2 9 on 7/1  Replete today and recheck BMP this evening  Due to poor oral intake yesterday, which is improved today

## 2022-07-03 NOTE — PLAN OF CARE
Problem: Prexisting or High Potential for Compromised Skin Integrity  Goal: Skin integrity is maintained or improved  Description: INTERVENTIONS:  - Identify patients at risk for skin breakdown  - Assess and monitor skin integrity  - Assess and monitor nutrition and hydration status  - Monitor labs   - Assess for incontinence   - Turn and reposition patient  - Assist with mobility/ambulation  - Relieve pressure over bony prominences  - Avoid friction and shearing  - Provide appropriate hygiene as needed including keeping skin clean and dry  - Evaluate need for skin moisturizer/barrier cream  - Collaborate with interdisciplinary team   - Patient/family teaching  - Consider wound care consult   Outcome: Progressing     Problem: MOBILITY - ADULT  Goal: Maintain or return to baseline ADL function  Description: INTERVENTIONS:  -  Assess patient's ability to carry out ADLs; assess patient's baseline for ADL function and identify physical deficits which impact ability to perform ADLs (bathing, care of mouth/teeth, toileting, grooming, dressing, etc )  - Assess/evaluate cause of self-care deficits   - Assess range of motion  - Assess patient's mobility; develop plan if impaired  - Assess patient's need for assistive devices and provide as appropriate  - Encourage maximum independence but intervene and supervise when necessary  - Involve family in performance of ADLs  - Assess for home care needs following discharge   - Consider OT consult to assist with ADL evaluation and planning for discharge  - Provide patient education as appropriate  Outcome: Progressing  Goal: Maintains/Returns to pre admission functional level  Description: INTERVENTIONS:  - Perform BMAT or MOVE assessment daily    - Set and communicate daily mobility goal to care team and patient/family/caregiver  - Collaborate with rehabilitation services on mobility goals if consulted  - Perform Range of Motion 3 times a day    - Reposition patient every 2 hours   - Dangle patient 3 times a day  - Stand patient 3 times a day  - Ambulate patient 3 times a day  - Out of bed to chair 3 times a day   - Out of bed for meals 3 times a day  - Out of bed for toileting  - Record patient progress and toleration of activity level   Outcome: Progressing     Problem: Potential for Falls  Goal: Patient will remain free of falls  Description: INTERVENTIONS:  - Educate patient/family on patient safety including physical limitations  - Instruct patient to call for assistance with activity   - Consult OT/PT to assist with strengthening/mobility   - Keep Call bell within reach  - Keep bed low and locked with side rails adjusted as appropriate  - Keep care items and personal belongings within reach  - Initiate and maintain comfort rounds  - Make Fall Risk Sign visible to staff  - Offer Toileting every bed  Hours, in advance of need  - Initiate/Maintain bed alarm  - Obtain necessary fall risk management equipment: bed alarm  - Apply yellow socks and bracelet for high fall risk patients  - Consider moving patient to room near nurses station  Outcome: Progressing     Problem: Nutrition/Hydration-ADULT  Goal: Nutrient/Hydration intake appropriate for improving, restoring or maintaining nutritional needs  Description: Monitor and assess patient's nutrition/hydration status for malnutrition  Collaborate with interdisciplinary team and initiate plan and interventions as ordered  Monitor patient's weight and dietary intake as ordered or per policy  Utilize nutrition screening tool and intervene as necessary  Determine patient's food preferences and provide high-protein, high-caloric foods as appropriate       INTERVENTIONS:  - Monitor oral intake, urinary output, labs, and treatment plans  - Assess nutrition and hydration status and recommend course of action  - Evaluate amount of meals eaten  - Assist patient with eating if necessary   - Allow adequate time for meals  - Recommend/ encourage appropriate diets, oral nutritional supplements, and vitamin/mineral supplements  - Order, calculate, and assess calorie counts as needed  - Recommend, monitor, and adjust tube feedings and TPN/PPN based on assessed needs  - Assess need for intravenous fluids  - Provide specific nutrition/hydration education as appropriate  - Include patient/family/caregiver in decisions related to nutrition  Outcome: Progressing     Problem: NEUROSENSORY - ADULT  Goal: Achieves stable or improved neurological status  Description: INTERVENTIONS  - Monitor and report changes in neurological status  - Monitor vital signs such as temperature, blood pressure, glucose, and any other labs ordered   - Initiate measures to prevent increased intracranial pressure  - Monitor for seizure activity and implement precautions if appropriate      Outcome: Progressing  Goal: Remains free of injury related to seizures activity  Description: INTERVENTIONS  - Maintain airway, patient safety  and administer oxygen as ordered  - Monitor patient for seizure activity, document and report duration and description of seizure to physician/advanced practitioner  - If seizure occurs,  ensure patient safety during seizure  - Reorient patient post seizure  - Seizure pads on all 4 side rails  - Instruct patient/family to notify RN of any seizure activity including if an aura is experienced  - Instruct patient/family to call for assistance with activity based on nursing assessment  - Administer anti-seizure medications if ordered    Outcome: Progressing  Goal: Achieves maximal functionality and self care  Description: INTERVENTIONS  - Monitor swallowing and airway patency with patient fatigue and changes in neurological status  - Encourage and assist patient to increase activity and self care     - Encourage visually impaired, hearing impaired and aphasic patients to use assistive/communication devices  Outcome: Progressing     Problem: SAFETY ADULT  Goal: Maintain or return to baseline ADL function  Description: INTERVENTIONS:  -  Assess patient's ability to carry out ADLs; assess patient's baseline for ADL function and identify physical deficits which impact ability to perform ADLs (bathing, care of mouth/teeth, toileting, grooming, dressing, etc )  - Assess/evaluate cause of self-care deficits   - Assess range of motion  - Assess patient's mobility; develop plan if impaired  - Assess patient's need for assistive devices and provide as appropriate  - Encourage maximum independence but intervene and supervise when necessary  - Involve family in performance of ADLs  - Assess for home care needs following discharge   - Consider OT consult to assist with ADL evaluation and planning for discharge  - Provide patient education as appropriate  Outcome: Progressing  Goal: Maintains/Returns to pre admission functional level  Description: INTERVENTIONS:  - Perform BMAT or MOVE assessment daily    - Set and communicate daily mobility goal to care team and patient/family/caregiver  - Collaborate with rehabilitation services on mobility goals if consulted  - Perform Range of Motion 3 times a day  - Reposition patient every 2 hours    - Dangle patient 3 times a day  - Stand patient 3 times a day  - Ambulate patient 3 times a day  - Out of bed to chair 3 times a day   - Out of bed for meals 3 times a day  - Out of bed for toileting  - Record patient progress and toleration of activity level   Outcome: Progressing  Goal: Patient will remain free of falls  Description: INTERVENTIONS:  - Educate patient/family on patient safety including physical limitations  - Instruct patient to call for assistance with activity   - Consult OT/PT to assist with strengthening/mobility   - Keep Call bell within reach  - Keep bed low and locked with side rails adjusted as appropriate  - Keep care items and personal belongings within reach  - Initiate and maintain comfort rounds  - Make Fall Risk Sign visible to staff  - Offer Toileting every 2 Hours, in advance of need  - Initiate/Maintain bed alarm  - Obtain necessary fall risk management equipment: bed alarm  - Apply yellow socks and bracelet for high fall risk patients  - Consider moving patient to room near nurses station  Outcome: Progressing     Problem: DISCHARGE PLANNING  Goal: Discharge to home or other facility with appropriate resources  Description: INTERVENTIONS:  - Identify barriers to discharge w/patient and caregiver  - Arrange for needed discharge resources and transportation as appropriate  - Identify discharge learning needs (meds, wound care, etc )  - Arrange for interpretive services to assist at discharge as needed  - Refer to Case Management Department for coordinating discharge planning if the patient needs post-hospital services based on physician/advanced practitioner order or complex needs related to functional status, cognitive ability, or social support system  Outcome: Progressing

## 2022-07-03 NOTE — ASSESSMENT & PLAN NOTE
- had bilious emesis on 7/2  - abdominal exam benign, non-distended  KUB negative for obstruction or ileus/distention  Labs unremarkable  Given multiple doses of IV zofran and reglan  - CT head with slight increase in R sided SDH, per neurosurgery is clinically insignificant and no intervention needed  - Will schedule reglan today  Overall N/V is improved and he is tolerating a diet

## 2022-07-03 NOTE — CASE MANAGEMENT
Case Management Discharge Planning Note    Patient name Fabby De Anda  Location [facility] 603/PPHP 121-39 MRN [id number]  : 1950 Date 7/3/2022       Current Admission Date: 2022  Current Admission Diagnosis:Subdural hematoma [facility])   Patient Active Problem List    Diagnosis Date Noted    Nausea and vomiting 2022    Hypokalemia 2022    Acute blood loss anemia 2022    GIB (gastrointestinal bleeding) 2022    Hemiplegia, post-stroke ([address] ) 2022    Essential hypertension 2022    GERD (gastroesophageal reflux disease) 2022    Major depressive disorder 2022    Tobacco use 2022    Subdural hematoma ([facility] ) 2022    Chronic bilateral low back pain without sciatica 2020    Cervical spinal stenosis 2019    Cervical spondylosis 2019    Cervical radiculopathy 2019    Neck pain 2019    Cervical disc disorder with radiculopathy of mid-cervical region 2019    Long-term current use of opiate analgesic     Uncomplicated opioid dependence ([facility] ) 2019    Chronic pain syndrome 2019    Lumbar spondylosis 2019      LOS (days): 6  Geometric Mean LOS (GMLOS) (days): 3 30  Days to GMLOS:-2 6     OBJECTIVE:  Risk of Unplanned Readmission Score: 16 46         Current admission status: Inpatient   Preferred Pharmacy:   [facility] #2 - [address] - [address]  [address] [facility] [address]  Phone: [phone] Fax: [phone]    Primary Care Provider: Francisca Padron DO    Primary Insurance: Shirin Pittman [facility]  Secondary Insurance: [facility]    DISCHARGE DETAILS:    Per PA Frutiger pt is medically stable for DC  CM left a voicemail for [facility] informing them on pt being medically stable for DC, and if they have a bed available

## 2022-07-03 NOTE — ASSESSMENT & PLAN NOTE
S/p fall on Eliquis, received [facility] on admission due to SDH  TEG obtained on arrival to [facility] without coagulopathies  Repeat CT head on 6/28 revealed stability  F/u CT on 7/2 showed improved SDH on L with slight increase in SD hygroma on Right side  No further intervention recommended by neurosurgery and cleared to continue SQH  Appreciate neurosurgery evaluation and recommendations including conservative management  On SQH for DVT ppx   Keppra for seizure prophylaxis for 7 days     Continue to hold AC/AP, including home eliquis until cleared by neurosurgery to resume  PT OT evaluation and treatment  F/u with neurosurgery in  2 weeks with repeat CT head at that time

## 2022-07-03 NOTE — PROGRESS NOTES
Pastoral Care Progress Note    7/3/2022  Patient: Marston Schlatter : 1950  Admission Date & Time: 2022 1741  MRN: [id number] [facility]: [id number]                     Chaplaincy Interventions Utilized:   Empowerment: Provided anxiety containment    Exploration: Explored hope    Collaboration: Facilitated respect for spiritual/cultural practice during hospitalization    Relationship Building: Listened empathically    Ritual: Provided prayer        Chaplaincy Outcomes Achieved:  Catharsis        Spiritual Coping Strategies Utilized:   Connectedness, Spiritual practices, Spiritual comfort, Spiritual empowerment, Spiritual growth or transformation, and Spiritual gratitude       22 1600   Clinical Encounter Type   Visited With Patient   Crisis Visit Critical Care   Referral From Nurse   Referral To    Consult Patient care   Christian Encounters   Christian Needs Prayer   Patient Spiritual Encounters   Spiritual Assessment 5   Suffering Severity 3   Fear Level 4     Provided spiritual and emotional support  Spiritual empowerment and Spiritual growth  Talked life change conditions, and new goals and meaning, connectedness and networking with [place] [facility]

## 2022-07-04 VITALS
HEART RATE: 57 BPM | TEMPERATURE: 97.9 F | WEIGHT: 133.38 LBS | HEIGHT: 70 IN | OXYGEN SATURATION: 97 % | DIASTOLIC BLOOD PRESSURE: 86 MMHG | SYSTOLIC BLOOD PRESSURE: 188 MMHG | BODY MASS INDEX: 19.09 KG/M2 | RESPIRATION RATE: 17 BRPM

## 2022-07-04 LAB
ANION GAP SERPL CALCULATED.3IONS-SCNC: 7 MMOL/L (ref 4–13)
BASOPHILS # BLD AUTO: 0.03 THOUSANDS/ΜL (ref 0–0.1)
BASOPHILS NFR BLD AUTO: 0 % (ref 0–1)
BUN SERPL-MCNC: 10 MG/DL (ref 5–25)
CALCIUM SERPL-MCNC: 8 MG/DL (ref 8.3–10.1)
CHLORIDE SERPL-SCNC: 106 MMOL/L (ref 100–108)
CO2 SERPL-SCNC: 25 MMOL/L (ref 21–32)
CREAT SERPL-MCNC: 0.66 MG/DL (ref 0.6–1.3)
EOSINOPHIL # BLD AUTO: 0.05 THOUSAND/ΜL (ref 0–0.61)
EOSINOPHIL NFR BLD AUTO: 1 % (ref 0–6)
ERYTHROCYTE [DISTWIDTH] IN BLOOD BY AUTOMATED COUNT: 15.3 % (ref 11.6–15.1)
GFR SERPL CREATININE-BSD FRML MDRD: 97 ML/MIN/1.73SQ M
GLUCOSE SERPL-MCNC: 98 MG/DL (ref 65–140)
HCT VFR BLD AUTO: 30.5 % (ref 36.5–49.3)
HGB BLD-MCNC: 10 G/DL (ref 12–17)
IMM GRANULOCYTES # BLD AUTO: 0.02 THOUSAND/UL (ref 0–0.2)
IMM GRANULOCYTES NFR BLD AUTO: 0 % (ref 0–2)
LYMPHOCYTES # BLD AUTO: 2.5 THOUSANDS/ΜL (ref 0.6–4.47)
LYMPHOCYTES NFR BLD AUTO: 33 % (ref 14–44)
MAGNESIUM SERPL-MCNC: 2 MG/DL (ref 1.6–2.6)
MCH RBC QN AUTO: 31.3 PG (ref 26.8–34.3)
MCHC RBC AUTO-ENTMCNC: 32.8 G/DL (ref 31.4–37.4)
MCV RBC AUTO: 96 FL (ref 82–98)
MONOCYTES # BLD AUTO: 0.55 THOUSAND/ΜL (ref 0.17–1.22)
MONOCYTES NFR BLD AUTO: 7 % (ref 4–12)
NEUTROPHILS # BLD AUTO: 4.47 THOUSANDS/ΜL (ref 1.85–7.62)
NEUTS SEG NFR BLD AUTO: 59 % (ref 43–75)
NRBC BLD AUTO-RTO: 0 /100 WBCS
PHOSPHATE SERPL-MCNC: 2.7 MG/DL (ref 2.3–4.1)
PLATELET # BLD AUTO: 218 THOUSANDS/UL (ref 149–390)
PMV BLD AUTO: 10 FL (ref 8.9–12.7)
POTASSIUM SERPL-SCNC: 3.5 MMOL/L (ref 3.5–5.3)
RBC # BLD AUTO: 3.19 MILLION/UL (ref 3.88–5.62)
SODIUM SERPL-SCNC: 138 MMOL/L (ref 136–145)
WBC # BLD AUTO: 7.62 THOUSAND/UL (ref 4.31–10.16)

## 2022-07-04 PROCEDURE — 99238 HOSP IP/OBS DSCHRG MGMT 30/<: CPT | Performed by: PHYSICIAN ASSISTANT

## 2022-07-04 PROCEDURE — 84100 ASSAY OF PHOSPHORUS: CPT | Performed by: PHYSICIAN ASSISTANT

## 2022-07-04 PROCEDURE — 83735 ASSAY OF MAGNESIUM: CPT | Performed by: PHYSICIAN ASSISTANT

## 2022-07-04 PROCEDURE — NC001 PR NO CHARGE: Performed by: PHYSICIAN ASSISTANT

## 2022-07-04 PROCEDURE — 85025 COMPLETE CBC W/AUTO DIFF WBC: CPT | Performed by: PHYSICIAN ASSISTANT

## 2022-07-04 PROCEDURE — 80048 BASIC METABOLIC PNL TOTAL CA: CPT | Performed by: PHYSICIAN ASSISTANT

## 2022-07-04 RX ADMIN — METOPROLOL SUCCINATE 100 MG: 100 TABLET, EXTENDED RELEASE ORAL at 08:26

## 2022-07-04 RX ADMIN — METOCLOPRAMIDE 5 MG: 5 TABLET ORAL at 12:59

## 2022-07-04 RX ADMIN — ATORVASTATIN CALCIUM 40 MG: 40 TABLET, FILM COATED ORAL at 08:26

## 2022-07-04 RX ADMIN — DULOXETINE HYDROCHLORIDE 60 MG: 60 CAPSULE, DELAYED RELEASE ORAL at 08:26

## 2022-07-04 RX ADMIN — HYDROXYZINE HYDROCHLORIDE 25 MG: 25 TABLET ORAL at 00:46

## 2022-07-04 RX ADMIN — LEVETIRACETAM 500 MG: 500 TABLET, FILM COATED ORAL at 08:26

## 2022-07-04 RX ADMIN — OXYCODONE HYDROCHLORIDE 5 MG: 5 TABLET ORAL at 07:30

## 2022-07-04 RX ADMIN — SENNOSIDES AND DOCUSATE SODIUM 1 TABLET: 50; 8.6 TABLET ORAL at 08:26

## 2022-07-04 RX ADMIN — PANTOPRAZOLE SODIUM 40 MG: 40 TABLET, DELAYED RELEASE ORAL at 05:21

## 2022-07-04 RX ADMIN — GABAPENTIN 300 MG: 300 CAPSULE ORAL at 08:26

## 2022-07-04 RX ADMIN — NICOTINE 1 PATCH: 14 PATCH, EXTENDED RELEASE TRANSDERMAL at 08:27

## 2022-07-04 RX ADMIN — METOCLOPRAMIDE 5 MG: 5 TABLET ORAL at 16:10

## 2022-07-04 RX ADMIN — HEPARIN SODIUM 5000 UNITS: 5000 INJECTION INTRAVENOUS; SUBCUTANEOUS at 13:00

## 2022-07-04 RX ADMIN — HEPARIN SODIUM 5000 UNITS: 5000 INJECTION INTRAVENOUS; SUBCUTANEOUS at 05:21

## 2022-07-04 RX ADMIN — OXYCODONE HYDROCHLORIDE 5 MG: 5 TABLET ORAL at 13:08

## 2022-07-04 RX ADMIN — METOCLOPRAMIDE 5 MG: 5 TABLET ORAL at 07:30

## 2022-07-04 NOTE — ASSESSMENT & PLAN NOTE
S/p fall on Eliquis, received [facility] on admission due to SDH  TEG obtained on arrival to [facility] without coagulopathies  Repeat CT head on 6/28 revealed stability  F/u CT on 7/2 showed improved SDH on L with slight increase in SD hygroma on Right side  No further intervention recommended by neurosurgery and cleared to continue SQH  Appreciate neurosurgery evaluation and recommendations including conservative management  On SQH for DVT ppx   Keppra for seizure prophylaxis for 7 days     Continue to hold AC/AP, including home eliquis until cleared by neurosurgery to resume  PT OT evaluation and treatment  F/u with neurosurgery in  2 weeks with repeat CT head at that time

## 2022-07-04 NOTE — CASE MANAGEMENT
Case Management Discharge Planning Note    Patient name Ryan Tyson  Location [facility] 603/PPHP 282-20 MRN [id number]  : 1950 Date 2022       Current Admission Date: 2022  Current Admission Diagnosis:Subdural hematoma [facility])   Patient Active Problem List    Diagnosis Date Noted    Nausea and vomiting 2022    Hypokalemia 2022    Acute blood loss anemia 2022    GIB (gastrointestinal bleeding) 2022    Hemiplegia, post-stroke ([facility] [address] ) 2022    Essential hypertension 2022    GERD (gastroesophageal reflux disease) 2022    Major depressive disorder 2022    Tobacco use 2022    Subdural hematoma ([facility] [address] ) 2022    Chronic bilateral low back pain without sciatica 2020    Cervical spinal stenosis 2019    Cervical spondylosis 2019    Cervical radiculopathy 2019    Neck pain 2019    Cervical disc disorder with radiculopathy of mid-cervical region 2019    Long-term current use of opiate analgesic     Uncomplicated opioid dependence ([facility] [address] ) 2019    Chronic pain syndrome 2019    Lumbar spondylosis 2019      LOS (days): 7  Geometric Mean LOS (GMLOS) (days): 3 30  Days to GMLOS:-3 6     OBJECTIVE:  Risk of Unplanned Readmission Score: 18 19         Current admission status: Inpatient   Preferred Pharmacy:   [facility] [place] [address] #2 - [address] [facility] [address]  [address] [facility] [address] [facility] [address]  Phone: [phone] Fax: [phone]    Primary Care Provider: Zi Thayer DO    Primary Insurance: [facility]  Secondary Insurance: [address]    DISCHARGE DETAILS:       Additional Comments: [address] Keely Guerrero was contacted and said there is a bed available today  Patient was contacted by hospital phone and is in agreement to transfer to the [facility] today    Patient asked CM to contact his sister Neeru Ugalde regarding the transfer  Jones Kitchen was notified by phone and in agreement  P6 MARY Osullivan was made aware of BLS request with [facility] [phone] for 6pm via [employer]  Awaiting confirmed time  DC envelope was given to P6 staff    Accepting Facility Name, [facility] & State : [facility]  Receiving Facility/Agency Phone Number: [phone]  ask for area 3  Facility/Agency Fax Number: [phone]       3:50pm    [facility] confirmed BLS with Arielle Amador for 5:15    P6 MARY Lo was made aware by hospital phone

## 2022-07-04 NOTE — PROGRESS NOTES
[facility]  Progress Note - Jesús Reeves 1950, 80 y o  male MRN: [id number]  Unit/Bed#: [facility] 603-01 Encounter: [id number]  Primary Care Provider: Vesna Espinoza DO   Date and time admitted to hospital: 6/27/2022  5:41 PM    * Subdural hematoma [facility])  Assessment & Plan  S/p fall on Eliquis, received [facility] on admission due to SDH  TE[facility] without coagulopathies  Repeat CT head on 6/28 revealed stability  F/u CT on 7/2 showed improved SDH on L with slight increase in SD hygroma on Right side  No further intervention recommended by neurosurgery and cleared to continue SQH  Appreciate neurosurgery evaluation and recommendations including conservative management  On SQH for DVT ppx   Keppra for seizure prophylaxis for 7 days  Continue to hold AC/AP, including home eliquis until cleared by neurosurgery to resume  PT OT evaluation and treatment  F/u with neurosurgery in  2 weeks with repeat CT head at that time    GIB (gastrointestinal bleeding)  Assessment & Plan  Presented with coffee ground emesis, suspected upper GI source  EDG and colonoscopy completed this admission  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  Biopsies of esophagus, stomach and duodenum were taken and pending  GI recommends PPI indefinitely  Hgb stable, no further signs of ongoing bleeding/coffee ground emesis  F/u with GI as outpatient for f/u of biopsy results and ongoing management as indicated        Acute blood loss anemia  Assessment & Plan  2/2 suspected upper GI bleed  Hgb stable at 10 0 at last check  No signs of ongoing bleeding  Heparin for DVT prophylaxis  F/u outpatient with GI  Continue PPI on discharge, indefinitely  Nausea and vomiting  Assessment & Plan  - had bilious emesis on 7/2, now resolved and tolerating diet  - abdominal exam benign, non-distended  KUB negative for obstruction or ileus/distention  Labs unremarkable   Given multiple doses of IV zofran and reglan  - CT head with slight increase in R sided SDH, per neurosurgery is clinically insignificant and no intervention needed  - Reglan scheduled  Now tolerating a diet  Hypokalemia  Assessment & Plan  Resolved s/p repletion  Improved now with improved oral intake of diet and s/p repletion  No need for recheck  Hemiplegia, post-stroke [facility])  Assessment & Plan  Chronic L sided hemiplegia, stable            Disposition: continue med-surg status, rehab placement pending acceptance and transport  CM waiting to hear back from facility  Patient is medically cleared and stable for discharge today  SUBJECTIVE:  Chief Complaint: "I'm doing better"    Subjective: Patient reports no further N/V  He is tolerating a diet  His headache feels better  He is motivated to go to rehab when possible  He is sleeping well  OBJECTIVE:   Vitals:   Temp:  [97 7 °F (36 5 °C)-99 °F (37 2 °C)] 97 9 °F (36 6 °C)  HR:  [55-63] 57  Resp:  [16-18] 17  BP: (121-188)/(61-86) 188/86    Intake/Output:  I/O       07/02 0701  07/03 0700 07/03 0701  07/04 0700 07/04 0701  07/05 0700    P  O  300 720     I V  (mL/kg) 1346 3 (22 3) 993 8 (16 4)     IV Piggyback  450     Total Intake(mL/kg) 1646 3 (27 2) 2163 8 (35 8)     Urine (mL/kg/hr) 750 (0 5) 1250 (0 9) 200 (0 4)    Emesis/NG output 0      Stool  0     Total Output 750 1250 200    Net +896 3 +913 8 -200           Unmeasured Urine Occurrence 1 x 2 x     Unmeasured Stool Occurrence  1 x     Unmeasured Emesis Occurrence 2 x           Nutrition: Diet Regular; Regular House  Discharge Diet  GI Proph/Bowel Reg: senokot  VTE Prophylaxis:Sequential compression device (Venodyne)  and Heparin     Physical Exam:   GENERAL APPEARANCE: NAD  NEURO: GCS 15,non-focal  HEENT: NCAT  CV: RRR, no MGR  LUNGS: CTA bilaterally  GI: soft,non-tender,non-distended  : voiding  MSK: no edema, contusion or deformity  SKIN: pink, warm, dry    Invasive Devices  Report Peripheral Intravenous Line  Duration           Peripheral IV 07/01/22 Right;Upper;Ventral (anterior) Arm 3 days    Peripheral IV 07/01/22 Right;Ventral (anterior) Forearm 3 days                      Lab Results:   Results: I have personally reviewed all pertinent laboratory/tests results, BMP/CMP:   Lab Results   Component Value Date    SODIUM 138 07/04/2022    K 3 5 07/04/2022     07/04/2022    CO2 25 07/04/2022    BUN 10 07/04/2022    CREATININE 0 66 07/04/2022    CALCIUM 8 0 (L) 07/04/2022    EGFR 97 07/04/2022    and CBC:   Lab Results   Component Value Date    WBC 7 62 07/04/2022    HGB 10 0 (L) 07/04/2022    HCT 30 5 (L) 07/04/2022    MCV 96 07/04/2022     07/04/2022    MCH 31 3 07/04/2022    MCHC 32 8 07/04/2022    RDW 15 3 (H) 07/04/2022    MPV 10 0 07/04/2022    NRBC 0 07/04/2022     Imaging/EKG Studies: I have personally reviewed pertinent reports       Other Studies: no new

## 2022-07-04 NOTE — ASSESSMENT & PLAN NOTE
- had bilious emesis on 7/2, now resolved and tolerating diet  - abdominal exam benign, non-distended  KUB negative for obstruction or ileus/distention  Labs unremarkable  Given multiple doses of IV zofran and reglan  - CT head with slight increase in R sided SDH, per neurosurgery is clinically insignificant and no intervention needed  - Reglan scheduled  Now tolerating a diet

## 2022-07-04 NOTE — ASSESSMENT & PLAN NOTE
Resolved s/p repletion  Improved now with improved oral intake of diet and s/p repletion  No need for recheck

## 2022-07-04 NOTE — PLAN OF CARE
Problem: Prexisting or High Potential for Compromised Skin Integrity  Goal: Skin integrity is maintained or improved  Description: INTERVENTIONS:  - Identify patients at risk for skin breakdown  - Assess and monitor skin integrity  - Assess and monitor nutrition and hydration status  - Monitor labs   - Assess for incontinence   - Turn and reposition patient  - Assist with mobility/ambulation  - Relieve pressure over bony prominences  - Avoid friction and shearing  - Provide appropriate hygiene as needed including keeping skin clean and dry  - Evaluate need for skin moisturizer/barrier cream  - Collaborate with interdisciplinary team   - Patient/family teaching  - Consider wound care consult   Outcome: Progressing     Problem: MOBILITY - ADULT  Goal: Maintain or return to baseline ADL function  Description: INTERVENTIONS:  -  Assess patient's ability to carry out ADLs; assess patient's baseline for ADL function and identify physical deficits which impact ability to perform ADLs (bathing, care of mouth/teeth, toileting, grooming, dressing, etc )  - Assess/evaluate cause of self-care deficits   - Assess range of motion  - Assess patient's mobility; develop plan if impaired  - Assess patient's need for assistive devices and provide as appropriate  - Encourage maximum independence but intervene and supervise when necessary  - Involve family in performance of ADLs  - Assess for home care needs following discharge   - Consider OT consult to assist with ADL evaluation and planning for discharge  - Provide patient education as appropriate  Outcome: Progressing  Goal: Maintains/Returns to pre admission functional level  Description: INTERVENTIONS:  - Perform BMAT or MOVE assessment daily    - Set and communicate daily mobility goal to care team and patient/family/caregiver  - Collaborate with rehabilitation services on mobility goals if consulted  - Perform Range of Motion multiple times a day    - Reposition patient every 2 hours or self  - Dangle patient multiple times a day  - Stand patient multiple times a day  - Ambulate patient multiple times a day  - Out of bed for toileting  - Record patient progress and toleration of activity level   Outcome: Progressing     Problem: Potential for Falls  Goal: Patient will remain free of falls  Description: INTERVENTIONS:  - Educate patient/family on patient safety including physical limitations  - Instruct patient to call for assistance with activity   - Consult OT/PT to assist with strengthening/mobility   - Keep Call bell within reach  - Keep bed low and locked with side rails adjusted as appropriate  - Keep care items and personal belongings within reach  - Initiate and maintain comfort rounds  - Make Fall Risk Sign visible to staff  - Offer Toileting every 2 Hours, in advance of need  - Initiate/Maintain bed/chair alarm  - Obtain necessary fall risk management equipment  - Apply yellow socks and bracelet for high fall risk patients  - Consider moving patient to room near nurses station  Outcome: Progressing     Problem: Nutrition/Hydration-ADULT  Goal: Nutrient/Hydration intake appropriate for improving, restoring or maintaining nutritional needs  Description: Monitor and assess patient's nutrition/hydration status for malnutrition  Collaborate with interdisciplinary team and initiate plan and interventions as ordered  Monitor patient's weight and dietary intake as ordered or per policy  Utilize nutrition screening tool and intervene as necessary  Determine patient's food preferences and provide high-protein, high-caloric foods as appropriate       INTERVENTIONS:  - Monitor oral intake, urinary output, labs, and treatment plans  - Assess nutrition and hydration status and recommend course of action  - Evaluate amount of meals eaten  - Assist patient with eating if necessary   - Allow adequate time for meals  - Recommend/ encourage appropriate diets, oral nutritional supplements, and vitamin/mineral supplements  - Order, calculate, and assess calorie counts as needed  - Recommend, monitor, and adjust tube feedings and TPN/PPN based on assessed needs  - Assess need for intravenous fluids  - Provide specific nutrition/hydration education as appropriate  - Include patient/family/caregiver in decisions related to nutrition  Outcome: Progressing     Problem: NEUROSENSORY - ADULT  Goal: Achieves stable or improved neurological status  Description: INTERVENTIONS  - Monitor and report changes in neurological status  - Monitor vital signs such as temperature, blood pressure, glucose, and any other labs ordered   - Initiate measures to prevent increased intracranial pressure  - Monitor for seizure activity and implement precautions if appropriate      Outcome: Progressing  Goal: Remains free of injury related to seizures activity  Description: INTERVENTIONS  - Maintain airway, patient safety  and administer oxygen as ordered  - Monitor patient for seizure activity, document and report duration and description of seizure to physician/advanced practitioner  - If seizure occurs,  ensure patient safety during seizure  - Reorient patient post seizure  - Seizure pads on all 4 side rails  - Instruct patient/family to notify RN of any seizure activity including if an aura is experienced  - Instruct patient/family to call for assistance with activity based on nursing assessment  - Administer anti-seizure medications if ordered    Outcome: Progressing  Goal: Achieves maximal functionality and self care  Description: INTERVENTIONS  - Monitor swallowing and airway patency with patient fatigue and changes in neurological status  - Encourage and assist patient to increase activity and self care     - Encourage visually impaired, hearing impaired and aphasic patients to use assistive/communication devices  Outcome: Progressing     Problem: SAFETY ADULT  Goal: Maintain or return to baseline ADL function  Description: INTERVENTIONS:  -  Assess patient's ability to carry out ADLs; assess patient's baseline for ADL function and identify physical deficits which impact ability to perform ADLs (bathing, care of mouth/teeth, toileting, grooming, dressing, etc )  - Assess/evaluate cause of self-care deficits   - Assess range of motion  - Assess patient's mobility; develop plan if impaired  - Assess patient's need for assistive devices and provide as appropriate  - Encourage maximum independence but intervene and supervise when necessary  - Involve family in performance of ADLs  - Assess for home care needs following discharge   - Consider OT consult to assist with ADL evaluation and planning for discharge  - Provide patient education as appropriate  Outcome: Progressing  Goal: Maintains/Returns to pre admission functional level  Description: INTERVENTIONS:  - Perform BMAT or MOVE assessment daily    - Set and communicate daily mobility goal to care team and patient/family/caregiver     - Collaborate with rehabilitation services on mobility goals if consulted  - Record patient progress and toleration of activity level   Outcome: Progressing  Goal: Patient will remain free of falls  Description: INTERVENTIONS:  - Educate patient/family on patient safety including physical limitations  - Instruct patient to call for assistance with activity   - Consult OT/PT to assist with strengthening/mobility   - Keep Call bell within reach  - Keep bed low and locked with side rails adjusted as appropriate  - Keep care items and personal belongings within reach  - Initiate and maintain comfort rounds  - Make Fall Risk Sign visible to staff  - Apply yellow socks and bracelet for high fall risk patients  - Consider moving patient to room near nurses station  Outcome: Progressing     Problem: DISCHARGE PLANNING  Goal: Discharge to home or other facility with appropriate resources  Description: INTERVENTIONS:  - Identify barriers to discharge w/patient and caregiver  - Arrange for needed discharge resources and transportation as appropriate  - Identify discharge learning needs (meds, wound care, etc )  - Arrange for interpretive services to assist at discharge as needed  - Refer to Case Management Department for coordinating discharge planning if the patient needs post-hospital services based on physician/advanced practitioner order or complex needs related to functional status, cognitive ability, or social support system  Outcome: Progressing

## 2022-07-04 NOTE — PLAN OF CARE
Problem: Prexisting or High Potential for Compromised Skin Integrity  Goal: Skin integrity is maintained or improved  Description: INTERVENTIONS:  - Identify patients at risk for skin breakdown  - Assess and monitor skin integrity  - Assess and monitor nutrition and hydration status  - Monitor labs   - Assess for incontinence   - Turn and reposition patient  - Assist with mobility/ambulation  - Relieve pressure over bony prominences  - Avoid friction and shearing  - Provide appropriate hygiene as needed including keeping skin clean and dry  - Evaluate need for skin moisturizer/barrier cream  - Collaborate with interdisciplinary team   - Patient/family teaching  - Consider wound care consult   Outcome: Progressing     Problem: MOBILITY - ADULT  Goal: Maintain or return to baseline ADL function  Description: INTERVENTIONS:  -  Assess patient's ability to carry out ADLs; assess patient's baseline for ADL function and identify physical deficits which impact ability to perform ADLs (bathing, care of mouth/teeth, toileting, grooming, dressing, etc )  - Assess/evaluate cause of self-care deficits   - Assess range of motion  - Assess patient's mobility; develop plan if impaired  - Assess patient's need for assistive devices and provide as appropriate  - Encourage maximum independence but intervene and supervise when necessary  - Involve family in performance of ADLs  - Assess for home care needs following discharge   - Consider OT consult to assist with ADL evaluation and planning for discharge  - Provide patient education as appropriate  Outcome: Progressing  Goal: Maintains/Returns to pre admission functional level  Description: INTERVENTIONS:  - Perform BMAT or MOVE assessment daily    - Set and communicate daily mobility goal to care team and patient/family/caregiver  - Collaborate with rehabilitation services on mobility goals if consulted  - Perform Range of Motion  times a day    - Reposition patient every hours   - Dangle patient  times a day  - Stand patient  times a day  - Ambulate patient  times a day  - Out of bed to chair  times a day   - Out of bed for meals  times a day  - Out of bed for toileting  - Record patient progress and toleration of activity level   Outcome: Progressing     Problem: Potential for Falls  Goal: Patient will remain free of falls  Description: INTERVENTIONS:  - Educate patient/family on patient safety including physical limitations  - Instruct patient to call for assistance with activity   - Consult OT/PT to assist with strengthening/mobility   - Keep Call bell within reach  - Keep bed low and locked with side rails adjusted as appropriate  - Keep care items and personal belongings within reach  - Initiate and maintain comfort rounds  - Make Fall Risk Sign visible to staff  - Offer Toileting every  Hours, in advance of need  - Initiate/Maintain alarm  - Obtain necessary fall risk management equipment:   - Apply yellow socks and bracelet for high fall risk patients  - Consider moving patient to room near nurses station  Outcome: Progressing     Problem: Nutrition/Hydration-ADULT  Goal: Nutrient/Hydration intake appropriate for improving, restoring or maintaining nutritional needs  Description: Monitor and assess patient's nutrition/hydration status for malnutrition  Collaborate with interdisciplinary team and initiate plan and interventions as ordered  Monitor patient's weight and dietary intake as ordered or per policy  Utilize nutrition screening tool and intervene as necessary  Determine patient's food preferences and provide high-protein, high-caloric foods as appropriate       INTERVENTIONS:  - Monitor oral intake, urinary output, labs, and treatment plans  - Assess nutrition and hydration status and recommend course of action  - Evaluate amount of meals eaten  - Assist patient with eating if necessary   - Allow adequate time for meals  - Recommend/ encourage appropriate diets, oral nutritional supplements, and vitamin/mineral supplements  - Order, calculate, and assess calorie counts as needed  - Recommend, monitor, and adjust tube feedings and TPN/PPN based on assessed needs  - Assess need for intravenous fluids  - Provide specific nutrition/hydration education as appropriate  - Include patient/family/caregiver in decisions related to nutrition  Outcome: Progressing     Problem: NEUROSENSORY - ADULT  Goal: Achieves stable or improved neurological status  Description: INTERVENTIONS  - Monitor and report changes in neurological status  - Monitor vital signs such as temperature, blood pressure, glucose, and any other labs ordered   - Initiate measures to prevent increased intracranial pressure  - Monitor for seizure activity and implement precautions if appropriate      Outcome: Progressing  Goal: Remains free of injury related to seizures activity  Description: INTERVENTIONS  - Maintain airway, patient safety  and administer oxygen as ordered  - Monitor patient for seizure activity, document and report duration and description of seizure to physician/advanced practitioner  - If seizure occurs,  ensure patient safety during seizure  - Reorient patient post seizure  - Seizure pads on all 4 side rails  - Instruct patient/family to notify RN of any seizure activity including if an aura is experienced  - Instruct patient/family to call for assistance with activity based on nursing assessment  - Administer anti-seizure medications if ordered    Outcome: Progressing  Goal: Achieves maximal functionality and self care  Description: INTERVENTIONS  - Monitor swallowing and airway patency with patient fatigue and changes in neurological status  - Encourage and assist patient to increase activity and self care     - Encourage visually impaired, hearing impaired and aphasic patients to use assistive/communication devices  Outcome: Progressing     Problem: SAFETY ADULT  Goal: Maintain or return to baseline ADL function  Description: INTERVENTIONS:  -  Assess patient's ability to carry out ADLs; assess patient's baseline for ADL function and identify physical deficits which impact ability to perform ADLs (bathing, care of mouth/teeth, toileting, grooming, dressing, etc )  - Assess/evaluate cause of self-care deficits   - Assess range of motion  - Assess patient's mobility; develop plan if impaired  - Assess patient's need for assistive devices and provide as appropriate  - Encourage maximum independence but intervene and supervise when necessary  - Involve family in performance of ADLs  - Assess for home care needs following discharge   - Consider OT consult to assist with ADL evaluation and planning for discharge  - Provide patient education as appropriate  Outcome: Progressing  Goal: Maintains/Returns to pre admission functional level  Description: INTERVENTIONS:  - Perform BMAT or MOVE assessment daily    - Set and communicate daily mobility goal to care team and patient/family/caregiver  - Collaborate with rehabilitation services on mobility goals if consulted  - Perform Range of Motion times a day  - Reposition patient every  hours    - Dangle patient  times a day  - Stand patient  times a day  - Ambulate patient  times a day  - Out of bed to chair  times a day   - Out of bed for meals  times a day  - Out of bed for toileting  - Record patient progress and toleration of activity level   Outcome: Progressing  Goal: Patient will remain free of falls  Description: INTERVENTIONS:  - Educate patient/family on patient safety including physical limitations  - Instruct patient to call for assistance with activity   - Consult OT/PT to assist with strengthening/mobility   - Keep Call bell within reach  - Keep bed low and locked with side rails adjusted as appropriate  - Keep care items and personal belongings within reach  - Initiate and maintain comfort rounds  - Make Fall Risk Sign visible to staff  - Offer Toileting every  Hours, in advance of need  - Initiate/Maintain alarm  - Obtain necessary fall risk management equipment:  - Apply yellow socks and bracelet for high fall risk patients  - Consider moving patient to room near nurses station  Outcome: Progressing     Problem: DISCHARGE PLANNING  Goal: Discharge to home or other facility with appropriate resources  Description: INTERVENTIONS:  - Identify barriers to discharge w/patient and caregiver  - Arrange for needed discharge resources and transportation as appropriate  - Identify discharge learning needs (meds, wound care, etc )  - Arrange for interpretive services to assist at discharge as needed  - Refer to Case Management Department for coordinating discharge planning if the patient needs post-hospital services based on physician/advanced practitioner order or complex needs related to functional status, cognitive ability, or social support system  Outcome: Progressing

## 2022-07-04 NOTE — CASE MANAGEMENT
Case Management Discharge Planning Note    Patient name Holly Bynum  Location [address] [facility] [address]/PPHP 277-10 MRN [id number]  : 1950 Date 2022       Current Admission Date: 2022  Current Admission Diagnosis:Subdural hematoma [facility])   Patient Active Problem List    Diagnosis Date Noted    Nausea and vomiting 2022    Hypokalemia 2022    Acute blood loss anemia 2022    GIB (gastrointestinal bleeding) 2022    Hemiplegia, post-stroke ([facility] [address] ) 2022    Essential hypertension 2022    GERD (gastroesophageal reflux disease) 2022    Major depressive disorder 2022    Tobacco use 2022    Subdural hematoma ([facility] [address] ) 2022    Chronic bilateral low back pain without sciatica 2020    Cervical spinal stenosis 2019    Cervical spondylosis 2019    Cervical radiculopathy 2019    Neck pain 2019    Cervical disc disorder with radiculopathy of mid-cervical region 2019    Long-term current use of opiate analgesic     Uncomplicated opioid dependence ([facility] [address] ) 2019    Chronic pain syndrome 2019    Lumbar spondylosis 2019      LOS (days): 7  Geometric Mean LOS (GMLOS) (days): 3 30  Days to GMLOS:-3 4     OBJECTIVE:  Risk of Unplanned Readmission Score: 18 33         Current admission status: Inpatient   Preferred Pharmacy:   [facility] [facility] [address]  [address] [facility] [address] [facility] [address] [address]  Phone: [phone] Fax: [phone]    Primary Care Provider: Naz Torres DO    Primary Insurance: Joint venture between [facility] [employer]  Secondary Insurance: [address] DETAILS:       Additional CommentsMiguel Angel Kristin [phone] was contacted and Nursing Supervisor Bam Olivera said she has to talk to admissions regarding bed availability  Awaiting a call back for further DC needs    CM to be available

## 2022-07-04 NOTE — ASSESSMENT & PLAN NOTE
S/p fall on Eliquis, received [facility] on admission due to SDH  TEG obtained on arrival to [facility] without coagulopathies  Repeat CT head on 6/28 revealed stability  F/u CT on 7/2 showed improved SDH on L with slight increase in SD hygroma on Right side  No further intervention recommended by neurosurgery and cleared to continue SQH  Appreciate neurosurgery evaluation and recommendations including conservative management  On SQH for DVT ppx   Keppra for seizure prophylaxis for 7 days     Continue to hold AC/AP, including home eliquis until cleared by neurosurgery to resume  PT OT evaluation and treatment  F/u with neurosurgery in  2 weeks with repeat CT head at that time

## 2022-07-04 NOTE — DISCHARGE SUMMARY
[address] [facility]  Discharge- Gisell Almazan 1950, 80 y o  male MRN: [id number]  Unit/Bed#: [facility] 603-01 Encounter: [id number]  Primary Care Provider: Ofelia Figures, DO   Date and time admitted to hospital: 6/27/2022  5:41 PM    * Subdural hematoma [facility])  Assessment & Plan  S/p fall on Eliquis, received [facility] on admission due to SDH  TEG obtained on arrival to [facility] without coagulopathies  Repeat CT head on 6/28 revealed stability  F/u CT on 7/2 showed improved SDH on L with slight increase in SD hygroma on Right side  No further intervention recommended by neurosurgery and cleared to continue SQH  Appreciate neurosurgery evaluation and recommendations including conservative management  On SQH for DVT ppx   Keppra for seizure prophylaxis for 7 days  Continue to hold AC/AP, including home eliquis until cleared by neurosurgery to resume  PT OT evaluation and treatment  F/u with neurosurgery in  2 weeks with repeat CT head at that time    GIB (gastrointestinal bleeding)  Assessment & Plan  Presented with coffee ground emesis, suspected upper GI source  EDG and colonoscopy completed this admission  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  Biopsies of esophagus, stomach and duodenum were taken and pending  GI recommends PPI indefinitely  Hgb stable, no further signs of ongoing bleeding/coffee ground emesis  F/u with GI as outpatient for f/u of biopsy results and ongoing management as indicated        Acute blood loss anemia  Assessment & Plan  2/2 suspected upper GI bleed  Hgb stable at 10 0 at last check  No signs of ongoing bleeding  Heparin for DVT prophylaxis  F/u outpatient with GI  Continue PPI on discharge, indefinitely  Nausea and vomiting  Assessment & Plan  - had bilious emesis on 7/2, now resolved and tolerating diet  - abdominal exam benign, non-distended  KUB negative for obstruction or ileus/distention  Labs unremarkable   Given multiple doses of IV zofran and reglan  - CT head with slight increase in R sided SDH, per neurosurgery is clinically insignificant and no intervention needed  - Reglan scheduled  Now tolerating a diet  Hypokalemia  Assessment & Plan  Resolved s/p repletion  Improved now with improved oral intake of diet and s/p repletion  No need for recheck  Hemiplegia, post-stroke [facility])  Assessment & Plan  Chronic L sided hemiplegia, stable                Medical Problems             Resolved Problems  Date Reviewed: 7/4/2022   None                 Admission Date:   Admission Orders (From admission, onward)     Ordered        06/27/22 1808  Inpatient Admission  Once                        Admitting Diagnosis: SDH    HPI: As documented by Dr Fidelina Riggins who evaluated the patient on admission, "Jeffry Coleman is a 80 y o  male with PMHx of sick sinus syndrome, CVA, pAfib on Eliquis, HTN, GERD, AAA, HLD, chronic pain, hemiplegia, depression anxiety, who presented to [address] [place] [address] ED this morning complaining of one week of coffee ground emesis  Patient was then transferred to [address] [place] [address] for GI evaluation  Upon arrival, he was noted to be confused  Upon further prompting, he endorsed a fall three days ago where he hit his head  No LOC  He did not present to the ED right away because he only had a headache  His sister encouraged his arrival to the [address] [place] [address] ED early this morning  Workup initiated at [address] [place] [address] after hearing of the fall and confusion revealed a SDH  He was given [facility] for history of Eliquis  Patient was transferred to [facility] for trauma and neurosurgery evaluation and management       Upon arrival to [facility], he complains of a headache  States he started vomiting blood yesterday  Endorses a fall where he lost his balance 3 days ago, but denies LOC  "    Procedures Performed: No orders of the defined types were placed in this encounter        Summary of Hospital Course:  Patient was placed on the trauma service following fall when he sustained a subdural hematoma  He was seen by Neurosurgery recommended follow-up CT head which showed stability  He was cleared for subcutaneous heparin was placed on Keppra for seizure prophylaxis for 7 days  He had a week of coffee-ground emesis prior to admission so GI was consulted  He was taken for EGD and colonoscopy on 06/30 and 6/29 respectively  His EGD showed long segment Wise's esophagus, hiatal hernia and scalloping of the duodenal villi  He underwent biopsies of the esophagus, stomach, and duodenum  Biopsy results are pending  He was recommended to be on a proton pump inhibitor indefinitely  He will follow up outpatient with GI  On 07/02, he was set up for discharge to skilled nursing facility at [facility] would  At that time he was having nausea and vomiting so was discharged was canceled  He underwent repeat CT head which showed an increase right-sided subdural hygroma  His left-sided subdural hematoma was improved  Neurosurgery evaluated his imaging and recommended no change in management as they felt it was in significant clinically  He is recommended to follow up with Neurosurgery in 2 weeks for a repeat CT head at that time  He is cleared to continue subcutaneous heparin  At that time he underwent repeat blood work and a KUB as well  KUB showed no evidence of distention or obstruction  Lab work was unremarkable, and EKG showed no evidence of ischemia  He was treated with Reglan and Zofran in 24 hours is nausea and vomiting resolved  He was then tolerating a diet  His abdominal exam remained benign  And he is ultimately stable for discharge today  The facility slept exam deferred transfer and transport was arranged  Patient will follow up with Neurosurgery in GI  He will follow up with his primary care provider for continuity of care as well  He he can follow up with Trauma on an as-needed basis      Significant Findings, Care, Treatment and Services Provided: EGD    Result Date: 6/30/2022  Impression: Long segment blunt's performed 4 quadrant biopsies every 2 cms 5 cms hiatal hernia Biopsied stomach for H pylori Scalloping of the duodenal villi- biopsied for celiac disease RECOMMENDATION: Await pathology results PPI indefinitely  ATTENDING ATTESTATION:  I was present throughout the entire procedure from insertion to complete withdrawal of the scope  I performed all interventions myself or oversaw the fellow  Steph Stafford MD     Colonoscopy    Result Date: 6/30/2022  Impression: Normal colonoscopy External hemorrhoids RECOMMENDATION: No further screening colonoscopies necessary ATTENDING ATTESTATION:  I was present throughout the entire procedure from insertion to complete withdrawal of the scope  I performed all interventions myself or oversaw the fellow  Steph Stafford MD     XR chest 1 view portable    Result Date: 6/27/2022  Impression: 1  No acute cardiopulmonary disease noted  2   Tortuous thoracic aorta with possible mild aneurysmal dilatation of descending aorta  Workstation performed: [id number]     XR shoulder 2+ vw left    Result Date: 6/27/2022  Impression: No acute osseous abnormality  Degenerative changes as described  Workstation performed: [id number]     XR shoulder 2+ vw right    Result Date: 6/27/2022  Impression: No acute osseous abnormality  Workstation performed: [id number]     XR abdomen 1 view kub    Result Date: 7/3/2022  Impression: Unremarkable abdomen  Workstation performed: [id number]     CT head wo contrast    Result Date: 7/2/2022  Impression: Right convexity subdural hygroma /effusion has increased in size since the prior examinations  Left convexity subdural hematoma is overall slightly improved since the prior study  Interval improvement in previously seen foci of hemorrhagic contusion on the background of posterior right cerebral hemisphere encephalomalacia  The study was marked in [facility] for immediate notification   Workstation performed: [id number]     CT head wo contrast    Result Date: 6/28/2022  Impression: Stable CT appearance of the brain with acute left frontal parietal subdural resulting in only minimal mass effect  Several areas of contusion in the infarcted parenchyma within the posterior right hemisphere are stable  Workstation performed: [id number]     CT head wo contrast    Result Date: 6/27/2022  Impression: Acute subdural hematoma within the left hemisphere superficial to the frontal and parietal lobes with only minimal mass effect upon the adjacent brain parenchyma  Small hemorrhagic contusions are seen within the posterior aspect of the right hemisphere, within an area of encephalomalacia related to previous old infarct  No resulting mass effect  Additional chronic microangiopathic change within the brain parenchyma  I personally discussed this study with VIKKI ALBARADO on 6/27/2022 at 2:10 PM  Workstation performed: [id number]     CT facial bones wo contrast    Result Date: 6/27/2022  Impression: No acute osseous abnormality  Subtle left facial soft tissue contusion superficial to the frontal bone and orbit  There is  an acute subdural hematoma within the left hemisphere  See separate CT brain report  Workstation performed: [id number]     CT spine cervical wo contrast    Result Date: 6/27/2022  Impression: Cervical degenerative change with moderate canal stenosis and foraminal narrowing  No acute osseous abnormality  Workstation performed: [id number]       Complications: none    Condition at Discharge: good         Discharge instructions/Information to patient and family:   See after visit summary for information provided to patient and family  Provisions for Follow-Up Care:  See after visit summary for information related to follow-up care and any pertinent home health orders        PCP: Vesna Espinoza DO    Disposition: Home    Planned Readmission: No    Discharge Statement   I spent 25 minutes discharging the patient  This time was spent on the day of discharge  I had direct contact with the patient on the day of discharge  Additional documentation is required if more than 30 minutes were spent on discharge  Discharge Medications:  See after visit summary for reconciled discharge medications provided to patient and family

## 2022-07-04 NOTE — ASSESSMENT & PLAN NOTE
2/2 suspected upper GI bleed  Hgb stable at 10 0 at last check  No signs of ongoing bleeding  Heparin for DVT prophylaxis  F/u outpatient with GI  Continue PPI on discharge, indefinitely

## 2022-07-05 NOTE — TELEPHONE ENCOUNTER
Spoke with nevaeh sister holley Ng Mariano @ [phone] advised her of ct scan 2 weeks and f/u in office gave all locations and info -ba

## 2022-07-05 NOTE — UTILIZATION REVIEW
Notification of Discharge   This is a Notification of Discharge from our facility [address]  Please be advised that this patient has been discharge from our facility  Below you will find the admission and discharge date and time including the patients disposition  UTILIZATION REVIEW CONTACT:  Bonnie Zavala  Utilization   Network Utilization Review Department  Phone: [phone] x carefully listen to the prompts  All voicemails are confidential   Email: [email or web address]     PHYSICIAN ADVISORY SERVICES:  FOR [id number] REVIEW - MEDICAL NECESSITY DENIAL  Phone: [phone]  Fax: [phone]  Email: [email or web address]     PRESENTATION DATE: 6/27/2022  5:41 PM  OBERVATION ADMISSION DATE:  INPATIENT ADMISSION DATE: 6/27/22  5:41 PM   DISCHARGE DATE: 7/4/2022  6:25 PM  DISPOSITION: Non [facility] SNF/TCU/SNU Non [facility] SNF/TCU/SNU      IMPORTANT INFORMATION:  Send all requests for admission clinical reviews, approved or denied determinations and any other requests to dedicated fax number below belonging to the campus where the patient is receiving treatment   List of dedicated fax numbers:  [address] DENIALS (Administrative/Medical Necessity) [phone]   [address] (Maternity/NICU/Pediatrics) [phone]   Yara Handing [phone]   [address] [facility] [phone]   [address] [place] [address] [phone]   [address] [phone]   [facility]  [phone]   220[address] [facility]  [address] [facility] [address] [facility] [phone]

## 2022-07-11 LAB
AMPHETAMINES UR QL SCN: POSITIVE
BARBITURATES UR QL SCN: NEGATIVE NG/ML
BENZODIAZ UR QL: NEGATIVE NG/ML
BZE UR QL: NEGATIVE NG/ML
CANNABINOIDS UR QL SCN: POSITIVE
METHADONE UR QL SCN: NEGATIVE NG/ML
OPIATES UR QL: NEGATIVE NG/ML
PCP UR QL: NEGATIVE NG/ML
PROPOXYPH UR QL SCN: NEGATIVE NG/ML

## 2022-09-18 ENCOUNTER — APPOINTMENT (OUTPATIENT)
Dept: RADIOLOGY | Facility: HOSPITAL | Age: 72
End: 2022-09-18
Payer: COMMERCIAL

## 2022-09-18 ENCOUNTER — HOSPITAL ENCOUNTER (EMERGENCY)
Facility: HOSPITAL | Age: 72
Discharge: HOME | End: 2022-09-19
Attending: EMERGENCY MEDICINE | Admitting: EMERGENCY MEDICINE
Payer: COMMERCIAL

## 2022-09-18 ENCOUNTER — APPOINTMENT (EMERGENCY)
Dept: CT IMAGING | Facility: HOSPITAL | Age: 72
End: 2022-09-18
Payer: COMMERCIAL

## 2022-09-18 DIAGNOSIS — M54.9 CHRONIC BACK PAIN: ICD-10-CM

## 2022-09-18 DIAGNOSIS — G89.29 CHRONIC BACK PAIN: ICD-10-CM

## 2022-09-18 DIAGNOSIS — S09.90XA CLOSED HEAD INJURY, INITIAL ENCOUNTER: Primary | ICD-10-CM

## 2022-09-18 DIAGNOSIS — I71.40 ABDOMINAL AORTIC ANEURYSM (AAA) >39 MM DIAMETER: ICD-10-CM

## 2022-09-18 LAB
2HR DELTA HS TROPONIN: 1 NG/L
ABO GROUP BLD: NORMAL
ALBUMIN SERPL BCP-MCNC: 4.3 G/DL (ref 3.5–5)
ALP SERPL-CCNC: 75 U/L (ref 34–104)
ALT SERPL W P-5'-P-CCNC: 12 U/L (ref 7–52)
ANION GAP SERPL CALCULATED.3IONS-SCNC: 9 MMOL/L (ref 4–13)
APTT PPP: 25 SECONDS (ref 23–37)
AST SERPL W P-5'-P-CCNC: 14 U/L (ref 13–39)
BASOPHILS # BLD AUTO: 0.04 THOUSANDS/ΜL (ref 0–0.1)
BASOPHILS NFR BLD AUTO: 1 % (ref 0–1)
BILIRUB DIRECT SERPL-MCNC: 0.08 MG/DL (ref 0–0.2)
BILIRUB SERPL-MCNC: 0.57 MG/DL (ref 0.2–1)
BLD GP AB SCN SERPL QL: NEGATIVE
BUN SERPL-MCNC: 18 MG/DL (ref 5–25)
CALCIUM SERPL-MCNC: 9.5 MG/DL (ref 8.4–10.2)
CARDIAC TROPONIN I PNL SERPL HS: 4 NG/L
CARDIAC TROPONIN I PNL SERPL HS: 5 NG/L
CHLORIDE SERPL-SCNC: 103 MMOL/L (ref 96–108)
CO2 SERPL-SCNC: 27 MMOL/L (ref 21–32)
CREAT SERPL-MCNC: 1.07 MG/DL (ref 0.6–1.3)
EOSINOPHIL # BLD AUTO: 0.03 THOUSAND/ΜL (ref 0–0.61)
EOSINOPHIL NFR BLD AUTO: 0 % (ref 0–6)
ERYTHROCYTE [DISTWIDTH] IN BLOOD BY AUTOMATED COUNT: 13.7 % (ref 11.6–15.1)
ETHANOL SERPL-MCNC: <10 MG/DL
GFR SERPL CREATININE-BSD FRML MDRD: 69 ML/MIN/1.73SQ M
GLUCOSE SERPL-MCNC: 89 MG/DL (ref 65–140)
GLUCOSE SERPL-MCNC: 89 MG/DL (ref 65–140)
HCT VFR BLD AUTO: 38.6 % (ref 36.5–49.3)
HGB BLD-MCNC: 12 G/DL (ref 12–17)
IMM GRANULOCYTES # BLD AUTO: 0.02 THOUSAND/UL (ref 0–0.2)
IMM GRANULOCYTES NFR BLD AUTO: 0 % (ref 0–2)
INR PPP: 1.04 (ref 0.84–1.19)
LYMPHOCYTES # BLD AUTO: 1.59 THOUSANDS/ΜL (ref 0.6–4.47)
LYMPHOCYTES NFR BLD AUTO: 24 % (ref 14–44)
MCH RBC QN AUTO: 31.3 PG (ref 26.8–34.3)
MCHC RBC AUTO-ENTMCNC: 31.1 G/DL (ref 31.4–37.4)
MCV RBC AUTO: 101 FL (ref 82–98)
MONOCYTES # BLD AUTO: 0.43 THOUSAND/ΜL (ref 0.17–1.22)
MONOCYTES NFR BLD AUTO: 6 % (ref 4–12)
NEUTROPHILS # BLD AUTO: 4.65 THOUSANDS/ΜL (ref 1.85–7.62)
NEUTS SEG NFR BLD AUTO: 69 % (ref 43–75)
NRBC BLD AUTO-RTO: 0 /100 WBCS
PLATELET # BLD AUTO: 229 THOUSANDS/UL (ref 149–390)
PMV BLD AUTO: 9.7 FL (ref 8.9–12.7)
POTASSIUM SERPL-SCNC: 4.6 MMOL/L (ref 3.5–5.3)
PROT SERPL-MCNC: 6.6 G/DL (ref 6.4–8.4)
PROTHROMBIN TIME: 13.6 SECONDS (ref 11.6–14.5)
RBC # BLD AUTO: 3.84 MILLION/UL (ref 3.88–5.62)
RH BLD: POSITIVE
SODIUM SERPL-SCNC: 139 MMOL/L (ref 135–147)
SPECIMEN EXPIRATION DATE: NORMAL
WBC # BLD AUTO: 6.76 THOUSAND/UL (ref 4.31–10.16)

## 2022-09-18 PROCEDURE — 80048 BASIC METABOLIC PNL TOTAL CA: CPT | Performed by: EMERGENCY MEDICINE

## 2022-09-18 PROCEDURE — 86901 BLOOD TYPING SEROLOGIC RH(D): CPT | Performed by: EMERGENCY MEDICINE

## 2022-09-18 PROCEDURE — 73552 X-RAY EXAM OF FEMUR 2/>: CPT

## 2022-09-18 PROCEDURE — 74177 CT ABD & PELVIS W/CONTRAST: CPT

## 2022-09-18 PROCEDURE — 82077 ASSAY SPEC XCP UR&BREATH IA: CPT | Performed by: EMERGENCY MEDICINE

## 2022-09-18 PROCEDURE — 86850 RBC ANTIBODY SCREEN: CPT | Performed by: EMERGENCY MEDICINE

## 2022-09-18 PROCEDURE — 85025 COMPLETE CBC W/AUTO DIFF WBC: CPT | Performed by: EMERGENCY MEDICINE

## 2022-09-18 PROCEDURE — 70450 CT HEAD/BRAIN W/O DYE: CPT

## 2022-09-18 PROCEDURE — 72125 CT NECK SPINE W/O DYE: CPT

## 2022-09-18 PROCEDURE — 99285 EMERGENCY DEPT VISIT HI MDM: CPT | Performed by: EMERGENCY MEDICINE

## 2022-09-18 PROCEDURE — 99285 EMERGENCY DEPT VISIT HI MDM: CPT

## 2022-09-18 PROCEDURE — 86900 BLOOD TYPING SEROLOGIC ABO: CPT | Performed by: EMERGENCY MEDICINE

## 2022-09-18 PROCEDURE — 71045 X-RAY EXAM CHEST 1 VIEW: CPT

## 2022-09-18 PROCEDURE — 96360 HYDRATION IV INFUSION INIT: CPT

## 2022-09-18 PROCEDURE — 82948 REAGENT STRIP/BLOOD GLUCOSE: CPT

## 2022-09-18 PROCEDURE — 84484 ASSAY OF TROPONIN QUANT: CPT | Performed by: EMERGENCY MEDICINE

## 2022-09-18 PROCEDURE — 96361 HYDRATE IV INFUSION ADD-ON: CPT

## 2022-09-18 PROCEDURE — 72170 X-RAY EXAM OF PELVIS: CPT

## 2022-09-18 PROCEDURE — 36415 COLL VENOUS BLD VENIPUNCTURE: CPT | Performed by: EMERGENCY MEDICINE

## 2022-09-18 PROCEDURE — 80076 HEPATIC FUNCTION PANEL: CPT | Performed by: EMERGENCY MEDICINE

## 2022-09-18 PROCEDURE — 85730 THROMBOPLASTIN TIME PARTIAL: CPT | Performed by: EMERGENCY MEDICINE

## 2022-09-18 PROCEDURE — 85610 PROTHROMBIN TIME: CPT | Performed by: EMERGENCY MEDICINE

## 2022-09-18 RX ORDER — LIDOCAINE 50 MG/G
1 PATCH TOPICAL ONCE
Status: COMPLETED | OUTPATIENT
Start: 2022-09-18 | End: 2022-09-19

## 2022-09-18 RX ADMIN — SODIUM CHLORIDE 500 ML: 0.9 INJECTION, SOLUTION INTRAVENOUS at 19:35

## 2022-09-18 RX ADMIN — LIDOCAINE 1 PATCH: 50 PATCH TOPICAL at 19:34

## 2022-09-18 RX ADMIN — IOHEXOL 100 ML: 350 INJECTION, SOLUTION INTRAVENOUS at 17:41
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
## 2022-09-19 VITALS
HEART RATE: 55 BPM | DIASTOLIC BLOOD PRESSURE: 79 MMHG | RESPIRATION RATE: 16 BRPM | BODY MASS INDEX: 19.26 KG/M2 | TEMPERATURE: 98.3 F | OXYGEN SATURATION: 95 % | WEIGHT: 134.26 LBS | SYSTOLIC BLOOD PRESSURE: 134 MMHG
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
## 2022-09-19 NOTE — ED NOTES
Called [facility] per wheelchair Haydee jacobo ride home; Spoke with Lianna Hamm who said they will call back with p/u time          Jonathan Pool RN  09/18/22 8600

## 2022-09-19 NOTE — ED NOTES
This writer discussed the patients current presentation and recommended discharge plan with Dr Ware  They agree with the patient being discharged  The patient was Instructed to follow up with their primary physician  The patient was provided with referral information for:   Psychiatrist if needed  This writer and the patient completed a safety plan  The patient was provided with a copy of their safety plan with encouragement to utilize the plan following discharge  In addition, the patient was instructed to call local [facility] crisis, other crisis services, 911 or to go to the nearest ER immediately if their situation changes at any time  This writer discussed discharge plans with the patient, who agrees with and understands the discharge plans           SAFETY PLAN  Warning Signs (thoughts, images, mood, behavior, situations) of a potential crisis: anger, aggitation      Coping Skills (what can I do to take my mind off the problem, or to keep myself safe): remove yourself from situation, watch some favorite shows, listening to the radio      Outside Support (who can I reach out to for support and help): family, friends        [place] Suicide Prevention Hotline:  [address]: [facility]: [place] [address] [place] [address] [address] [phone] - Crisis   [phone] - Peer Support Talk Line (1-9pm daily)  [phone] - Teen Support Talk Line (1-9pm daily)  [address] [facility]) [phone] - [address] [facility]

## 2022-09-19 NOTE — ED NOTES
Patient resting in bed  Appears in no distress at this time       Nathaniel Gonzalez RN  09/19/22 3235

## 2022-09-19 NOTE — ED NOTES
Patient complained of the noise in the hallway and stated that he was not able to fall asleep  Patient was offered to move his bed to the end of the hallway for more quiet environment       Rosales Noland  09/19/22 9438

## 2022-09-19 NOTE — ED NOTES
Elba provided pickup time of [id number] with [facility]  09/19/22 6310

## 2022-09-19 NOTE — DISCHARGE INSTRUCTIONS
Is very important that you follow-up with vascular surgery as you have a large abdominal aortic aneurysm that if it ruptures you will die  If you develop any new or worsening symptoms please immediately return to your nearest emergency department  This writer discussed the patients current presentation and recommended discharge plan with Dr Ware  They agree with the patient being discharged  The patient was Instructed to follow up with their primary physician  The patient was provided with referral information for:   Psychiatrist if needed  This writer and the patient completed a safety plan  The patient was provided with a copy of their safety plan with encouragement to utilize the plan following discharge  In addition, the patient was instructed to call [facility] crisis, other crisis services, [facility] or to go to the nearest ER immediately if their situation changes at any time  This writer discussed discharge plans with the patient, who agrees with and understands the discharge plans           SAFETY PLAN  Warning Signs (thoughts, images, mood, behavior, situations) of a potential crisis: anger, aggitation      Coping Skills (what can I do to take my mind off the problem, or to keep myself safe): remove yourself from situation, watch some favorite shows, listening to the radio      Outside Support (who can I reach out to for support and help): family, friends        National Suicide Prevention Hotline:  [address]: [facility]: [place] [address] [place] [address] [phone] - Crisis   [phone] - Peer Support Talk Line (1-9pm daily)  [phone] - [address] DARIA Figueredo (1-9pm daily)  [phone] [phone] [facility] [address] [facility] [address] [place] [place] ([place]) [phone] - 110[address] [facility]

## 2023-02-25 ENCOUNTER — HOSPITAL ENCOUNTER (EMERGENCY)
Facility: HOSPITAL | Age: 73
Discharge: HOME | End: 2023-02-27
Attending: EMERGENCY MEDICINE

## 2023-02-25 DIAGNOSIS — I10 ESSENTIAL HYPERTENSION: ICD-10-CM

## 2023-02-25 DIAGNOSIS — Z00.8 ENCOUNTER FOR PSYCHOLOGICAL EVALUATION: ICD-10-CM

## 2023-02-25 DIAGNOSIS — F15.10 METHAMPHETAMINE USE (HCC): Primary | ICD-10-CM

## 2023-02-25 LAB
ALBUMIN SERPL BCP-MCNC: 4.1 G/DL (ref 3.5–5)
ALP SERPL-CCNC: 80 U/L (ref 34–104)
ALT SERPL W P-5'-P-CCNC: 14 U/L (ref 7–52)
AMPHETAMINES SERPL QL SCN: POSITIVE
ANION GAP SERPL CALCULATED.3IONS-SCNC: 7 MMOL/L (ref 4–13)
AST SERPL W P-5'-P-CCNC: 21 U/L (ref 13–39)
ATRIAL RATE: 79 BPM
BARBITURATES UR QL: NEGATIVE
BASOPHILS # BLD AUTO: 0.04 THOUSANDS/ÂΜL (ref 0–0.1)
BASOPHILS NFR BLD AUTO: 1 % (ref 0–1)
BENZODIAZ UR QL: NEGATIVE
BILIRUB SERPL-MCNC: 0.4 MG/DL (ref 0.2–1)
BUN SERPL-MCNC: 16 MG/DL (ref 5–25)
CALCIUM SERPL-MCNC: 9.3 MG/DL (ref 8.4–10.2)
CHLORIDE SERPL-SCNC: 104 MMOL/L (ref 96–108)
CO2 SERPL-SCNC: 26 MMOL/L (ref 21–32)
COCAINE UR QL: NEGATIVE
CREAT SERPL-MCNC: 0.89 MG/DL (ref 0.6–1.3)
EOSINOPHIL # BLD AUTO: 0.07 THOUSAND/ÂΜL (ref 0–0.61)
EOSINOPHIL NFR BLD AUTO: 1 % (ref 0–6)
ERYTHROCYTE [DISTWIDTH] IN BLOOD BY AUTOMATED COUNT: 14.5 % (ref 11.6–15.1)
ETHANOL SERPL-MCNC: <10 MG/DL
GFR SERPL CREATININE-BSD FRML MDRD: 86 ML/MIN/1.73SQ M
GLUCOSE SERPL-MCNC: 84 MG/DL (ref 65–140)
HCT VFR BLD AUTO: 35.9 % (ref 36.5–49.3)
HGB BLD-MCNC: 11.7 G/DL (ref 12–17)
IMM GRANULOCYTES # BLD AUTO: 0.01 THOUSAND/UL (ref 0–0.2)
IMM GRANULOCYTES NFR BLD AUTO: 0 % (ref 0–2)
LYMPHOCYTES # BLD AUTO: 1.17 THOUSANDS/ÂΜL (ref 0.6–4.47)
LYMPHOCYTES NFR BLD AUTO: 15 % (ref 14–44)
MCH RBC QN AUTO: 31.2 PG (ref 26.8–34.3)
MCHC RBC AUTO-ENTMCNC: 32.6 G/DL (ref 31.4–37.4)
MCV RBC AUTO: 96 FL (ref 82–98)
METHADONE UR QL: NEGATIVE
MONOCYTES # BLD AUTO: 0.54 THOUSAND/ÂΜL (ref 0.17–1.22)
MONOCYTES NFR BLD AUTO: 7 % (ref 4–12)
NEUTROPHILS # BLD AUTO: 6.1 THOUSANDS/ÂΜL (ref 1.85–7.62)
NEUTS SEG NFR BLD AUTO: 76 % (ref 43–75)
NRBC BLD AUTO-RTO: 0 /100 WBCS
OPIATES UR QL SCN: NEGATIVE
OXYCODONE+OXYMORPHONE UR QL SCN: NEGATIVE
P AXIS: 77 DEGREES
PCP UR QL: NEGATIVE
PLATELET # BLD AUTO: 209 THOUSANDS/UL (ref 149–390)
PMV BLD AUTO: 9.5 FL (ref 8.9–12.7)
POTASSIUM SERPL-SCNC: 3.9 MMOL/L (ref 3.5–5.3)
PR INTERVAL: 172 MS
PROT SERPL-MCNC: 6.9 G/DL (ref 6.4–8.4)
QRS AXIS: -50 DEGREES
QRSD INTERVAL: 96 MS
QT INTERVAL: 382 MS
QTC INTERVAL: 438 MS
RBC # BLD AUTO: 3.75 MILLION/UL (ref 3.88–5.62)
SODIUM SERPL-SCNC: 137 MMOL/L (ref 135–147)
T WAVE AXIS: 42 DEGREES
THC UR QL: POSITIVE
TSH SERPL DL<=0.05 MIU/L-ACNC: 3.03 UIU/ML (ref 0.45–4.5)
VENTRICULAR RATE: 79 BPM
WBC # BLD AUTO: 7.93 THOUSAND/UL (ref 4.31–10.16)

## 2023-02-25 RX ORDER — LORAZEPAM 2 MG/ML
1 INJECTION INTRAMUSCULAR ONCE
Status: COMPLETED | OUTPATIENT
Start: 2023-02-25 | End: 2023-02-25

## 2023-02-25 RX ADMIN — LORAZEPAM 1 MG: 2 INJECTION INTRAMUSCULAR; INTRAVENOUS at 22:52

## 2023-02-25 RX ADMIN — LORAZEPAM 1 MG: 2 INJECTION INTRAMUSCULAR; INTRAVENOUS at 09:41

## 2023-02-25 NOTE — ED NOTES
This RN answering phone regarding patient  [place] crisis calling at this time stating they will fax over [phone]       Sandi Hawker, [address]  02/25/23 9841

## 2023-02-25 NOTE — ED NOTES
Patient given urinal to provide urine sample       Cullen Elliott, [facility]  02/25/23 1614
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
## 2023-02-25 NOTE — ED NOTES
belongings place in [facility], [address] [facility]  02/25/23 7942

## 2023-02-25 NOTE — ED NOTES
Patient continues to show bizarre attitude and being hard to redirect back into room  Patient continues to be say he wants to go home, made aware he cannot at this time  Patient placed on continual observation for behavior/fall risk       Culeln Elliott RN  02/25/23 7754

## 2023-02-25 NOTE — ED NOTES
2/25/2022 @ 1318: This writer first reached out to [place] [facility] [place] as [place] is the county the patient [place]  Aleyda Dixon stated that I need to call the county where the patient is currently residing at this moment  This [place] [facility] and spoke to Taco España will call this writer back shortly  Taco is on-call for the weekend at [phone])  Taco from [place] [facility] [place] [facility] returned this writers call  Normal business hours for this office is Monday through Friday  8-4:30pm ([phone])  Taco is Requesting if the patient can stay in the ED until Monday as there is staff (from the [facility]) that has a repor on the history of the household, the patient, and his case  This writer stated that he will need to be screened by psychiatry first before he can be discharged home  This writer will outreach Lai if anything were to change

## 2023-02-25 NOTE — ED NOTES
Patient found walking around room at this time, Patient unsteady walking at this time  Patient screaming at this RN stating he wants to go home  Crisis asked for update on plan & at bedside speaking to patient       Cullen Elliott RN  02/25/23 1300

## 2023-02-25 NOTE — ED NOTES
Patient given inpatient bed at this time, cooperative with this RN  Continual observation remains in place       Reny Harrington [facility]  02/25/23 6653

## 2023-02-25 NOTE — ED NOTES
302 faxed received & given to Dr Miky Payne at this time     Fatemeh Dye, [place]  02/25/23 1914
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
## 2023-02-25 NOTE — ED NOTES
Patient continues to act  bizarre and changing topics of discussion  Patient speaking to self while staff is not present in room  This RN asking provider for medication to de-escalate patient       Ama Jerez, [address] [facility]  02/25/23 2092

## 2023-02-26 LAB
AMPHETAMINES SERPL QL SCN: POSITIVE
BARBITURATES UR QL: NEGATIVE
BENZODIAZ UR QL: NEGATIVE
COCAINE UR QL: NEGATIVE
FLUAV RNA RESP QL NAA+PROBE: NEGATIVE
FLUBV RNA RESP QL NAA+PROBE: NEGATIVE
METHADONE UR QL: NEGATIVE
OPIATES UR QL SCN: NEGATIVE
OXYCODONE+OXYMORPHONE UR QL SCN: NEGATIVE
PCP UR QL: NEGATIVE
RSV RNA RESP QL NAA+PROBE: NEGATIVE
SARS-COV-2 RNA RESP QL NAA+PROBE: NEGATIVE
THC UR QL: POSITIVE

## 2023-02-26 RX ORDER — OMEPRAZOLE 40 MG/1
1 CAPSULE, DELAYED RELEASE ORAL DAILY
COMMUNITY
Start: 2023-01-22

## 2023-02-26 RX ORDER — LORAZEPAM 2 MG/ML
1 INJECTION INTRAMUSCULAR ONCE
Status: COMPLETED | OUTPATIENT
Start: 2023-02-26 | End: 2023-02-26

## 2023-02-26 RX ORDER — QUETIAPINE FUMARATE 25 MG/1
25 TABLET, FILM COATED ORAL
Status: DISCONTINUED | OUTPATIENT
Start: 2023-02-26 | End: 2023-02-27 | Stop reason: HOSPADM

## 2023-02-26 RX ORDER — LISINOPRIL 20 MG/1
20 TABLET ORAL DAILY
COMMUNITY
Start: 2023-02-01

## 2023-02-26 RX ORDER — SERTRALINE HYDROCHLORIDE 25 MG/1
25 TABLET, FILM COATED ORAL DAILY
COMMUNITY
Start: 2023-02-20

## 2023-02-26 RX ADMIN — LORAZEPAM 1 MG: 2 INJECTION INTRAMUSCULAR; INTRAVENOUS at 14:19

## 2023-02-26 RX ADMIN — QUETIAPINE FUMARATE 25 MG: 25 TABLET ORAL at 22:05
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
## 2023-02-26 NOTE — ED NOTES
This RN asking provider for removal of one to one observation  Patient showing no signs of increase fall risk or behavioral issues  Patient cleared by psych & d/c from psych recommendation  Patient waiting on area of aging Monday AM for treatment plan       Jesús Nazario, [facility]  02/26/23 8574
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
## 2023-02-26 NOTE — ED NOTES
Meal tray placed in room at this time, patient asleep       Paz Rosinasydnie, [address] [facility]  02/26/23 5201

## 2023-02-26 NOTE — ED NOTES
Patient cooperative & calm with this RN  Patient remains on virtual continual observation  Patient given meal tray & eating dinner at this time, states " not too hungry "     Bed alarm placed on bed for safety precautions       George Gill, [facility]  02/26/23 9784

## 2023-02-26 NOTE — ED NOTES
2/25/2023 @ 1400: This writer spoke to MD who advised to have the patient screened by psychiatry in the AM on 2/26/2023 after an updated UDS

## 2023-02-26 NOTE — ED NOTES
Provider asked for patient's daily medications to be ordered and tylenol for headache  Patient made aware of need of urine sample, unable to void at this time       Vivek Grant RN  02/26/23 1184

## 2023-02-26 NOTE — ED NOTES
Patient sleeping upon entry to room, patient awaken as psychiatrist ready for consult  Patient agreeable to psych consult at this time and given ipad       Taras Marshall, [address] [facility]  02/26/23 7135

## 2023-02-27 VITALS
RESPIRATION RATE: 20 BRPM | DIASTOLIC BLOOD PRESSURE: 88 MMHG | WEIGHT: 146 LBS | OXYGEN SATURATION: 96 % | HEART RATE: 84 BPM | TEMPERATURE: 97.1 F | BODY MASS INDEX: 20.44 KG/M2 | SYSTOLIC BLOOD PRESSURE: 123 MMHG | HEIGHT: 71 IN

## 2023-02-27 RX ORDER — LEVETIRACETAM 500 MG/1
500 TABLET ORAL EVERY 12 HOURS SCHEDULED
Status: DISCONTINUED | OUTPATIENT
Start: 2023-02-27 | End: 2023-02-27 | Stop reason: HOSPADM

## 2023-02-27 RX ORDER — LORAZEPAM 1 MG/1
1 TABLET ORAL ONCE
Status: COMPLETED | OUTPATIENT
Start: 2023-02-27 | End: 2023-02-27

## 2023-02-27 RX ADMIN — LORAZEPAM 1 MG: 1 TABLET ORAL at 10:23

## 2023-02-27 RX ADMIN — LEVETIRACETAM 500 MG: 500 TABLET, FILM COATED ORAL at 10:23

## 2023-02-27 NOTE — ED NOTES
Full linen change and clothing change for patient at this time  Patient moved to chair with assistance  Patient set up with breakfast tray Rojean Schaumann, RN  02/27/23 1331

## 2023-02-27 NOTE — ED NOTES
L/M with patient's sister, permission from patient, requesting medication list       Ilana Lemos, MARY  02/27/23 4038

## 2023-02-27 NOTE — ED NOTES
Pt unaware of his own meds, md previously aware  bradley consulted for med rec        Izzy Monroe RN  02/27/23 7119

## 2023-02-27 NOTE — CASE MANAGEMENT
Case Management Assessment & Discharge Planning Note    Patient name Mira Hunt  Location ED /ED 27 MRN [id number]  : 1950 Date 2023       Current Admission Date: 2023  Current Admission Diagnosis:Psychiatric complaint   Patient Active Problem List    Diagnosis Date Noted   • Nausea and vomiting 2022   • Hypokalemia 2022   • Acute blood loss anemia 2022   • GIB (gastrointestinal bleeding) 2022   • Hemiplegia, post-stroke ([address] ) 2022   • Essential hypertension 2022   • GERD (gastroesophageal reflux disease) 2022   • Major depressive disorder 2022   • Tobacco use 2022   • Subdural hematoma 2022   • Chronic bilateral low back pain without sciatica 2020   • Cervical spinal stenosis 2019   • Cervical spondylosis 2019   • Cervical radiculopathy 2019   • Neck pain 2019   • Cervical disc disorder with radiculopathy of mid-cervical region 2019   • Long-term current use of opiate analgesic    • Uncomplicated opioid dependence ([facility] [address] ) 2019   • Chronic pain syndrome 2019   • Lumbar spondylosis 2019      LOS (days): 0  Geometric Mean LOS (GMLOS) (days):   Days to GMLOS:     OBJECTIVE:        Current admission status: Emergency  Referral Reason: Other (Assist with discharge planning)    Preferred Pharmacy:   [address] [place] [address] Lei Alexander, [address] [place] [address]  [address] [address]  Phone: [phone] Fax: [phone]    Primary Care Provider: Miranda Molina DO    Primary Insurance: [facility]  Secondary Insurance: [address] [place] [address] Court:  [address] Proxies    There are no active Health Care Proxies on file         Readmission Root Cause  30 Day Readmission: No    Patient Information  Admitted from[de-identified] Home  Mental Status: Alert  During Assessment patient was accompanied by: Not accompanied during assessment  Assessment information provided by[de-identified] Patient  Primary Caregiver: Self  Support Systems: Self, Home care staff  [place] of Residence: [address] do you live in?: [address] entry access options  Select all that apply : Ramp  Type of Current Residence: 2 story home  Upon entering residence, is there a bedroom on the main floor (no further steps)?: Yes  Upon entering residence, is there a bathroom on the main floor (no further steps)?: Yes  In the last 12 months, was there a time when you were not able to pay the mortgage or rent on time?: No  In the last 12 months, how many places have you lived?: 1  In the last 12 months, was there a time when you did not have a steady place to sleep or slept in a shelter (including now)?: No  Homeless/housing insecurity resource given?: N/A  Living Arrangements: Lives w/ Friend  Is patient a ?: No    Activities of Daily Living Prior to Admission  Functional Status: Assistance (With cooking)  Completes ADLs independently?: No  Level of ADL dependence: Assistance (With bilat lower extremities)  Ambulates independently?: No  Level of ambulatory dependence: Assistance  Does patient use assisted devices?: Yes  Assisted Devices (DME) used:  Wheelchair  Does patient currently own DME?: Yes  What DME does the patient currently own?: Bedside Commode, Wheelchair, [address] Bed  Does patient have a history of Outpatient Therapy (PT/OT)?: No  Does the patient have a history of Short-Term Rehab?: Yes ([facility])  Does patient have a history of HHC?: No  Does patient currently have [facility]?: No    Patient Information Continued  Income Source: Pension/care home  Does patient have prescription coverage?: Yes  Within the past 12 months, you worried that your food would run out before you got the money to buy more : Never true  Within the past 12 months, the food you bought just didn't last and you didn't have money to get more : Never true  Food insecurity resource given?: N/A  Does patient receive dialysis treatments?: No  Does patient have a history of substance abuse?: No (Denies using drugs)  Does patient have a history of Mental Health Diagnosis?: No    Means of Transportation  Means of Transport to Appts[de-identified] Friends  In the past 12 months, has lack of transportation kept you from medical appointments or from getting medications?: No  In the past 12 months, has lack of transportation kept you from meetings, work, or from getting things needed for daily living?: No  Was application for public transport provided?: N/A    DISCHARGE DETAILS:    Discharge planning discussed with[de-identified] Patient  Freedom of Choice: Yes     CM contacted family/caregiver?: No- see comments (Declines)  Were Treatment Team discharge recommendations reviewed with patient/caregiver?: Yes  Did patient/caregiver verbalize understanding of patient care needs?: Yes  Were patient/caregiver advised of the risks associated with not following Treatment Team discharge recommendations?: Yes    [address]         Is the patient interested in [place] [address] at discharge?: No    DME Referral Provided  Referral made for DME?: No    Other Referral/Resources/Interventions Provided:  Interventions: Declines resources, Transportation, Other (Specify) ([facility] office of aging Jesus Mcarthur from SUE Humphreys)    Treatment Team Recommendation: Home  Discharge Destination Plan[de-identified] Home  Transport at Discharge : [address]: Yes  Number/Name of Dispatcher: Roundtrip  Transported by [employer] and Unit #): [facility]  ETA of Transport (Date): 02/27/23  ETA of Transport (Time): 1330     Additional Comments: Diamond jangieved from Jesus Mcarthur from [facility] office of Aging SUE Mcarthur is comming to hospital to see patient  Jesus Mcarthur requresting psych consult and discussed discharge planning  Met with patient at bedside to discuss discharge planning    Patient reports he owns and lives in his 2 story home  Home has a ramp and he has a first floor set up  Patient uses WC at baseline however reports he ambulates with a cane  Patient reports he has a friend Vickie Castrejon will assist at home  Has waiver services however had an issue with recent caregiver  Patient declines call to sister  Discussed substance uses and patient declines  Patient cleared for discharge will need a transport  WCV discussed and OP costs and agreeable  WCV scheduled for 1330     Updated bedside nurses Radha Munguia

## 2023-09-12 NOTE — ED NOTES
Case management at bedside     Aleda Bamberger, RN  02/27/23 1007 Bexarotene Pregnancy And Lactation Text: This medication is Pregnancy Category X and should not be given to women who are pregnant or may become pregnant. This medication should not be used if you are breast feeding.

## 2023-09-28 NOTE — ASSESSMENT & PLAN NOTE
S/p fall on Eliquis, received [address]  TEG obtained on arrival to [facility] without coagulopathies  Second head CT noncontrast-impression denoting Stable CT appearance of the brain with acute left frontal parietal subdural resulting in only minimal mass effect    Several areas of contusion in the infarcted parenchyma within the posterior right hemisphere are stable "  Continue to DVT prophylaxis for EGD to be performed today  Subdural hemorrhage likely traumatics/p fall  Neurosurgery with no surgical intervention with d/c hot protocol  -Keppra seizure prophylaxis  -Continue to hold AC/AP  -PT OT evaluation and treatment  Continue to monitor neurological status with f/u ct if gcs drops points or greater; current GCS 15 103

## 2025-03-17 NOTE — ASSESSMENT & PLAN NOTE
Chronic L sided hemiplegia, stable The patient has been re-examined and I agree with the above assessment or I updated with my findings.